# Patient Record
Sex: FEMALE | Race: WHITE
[De-identification: names, ages, dates, MRNs, and addresses within clinical notes are randomized per-mention and may not be internally consistent; named-entity substitution may affect disease eponyms.]

---

## 2023-03-08 ENCOUNTER — APPOINTMENT (OUTPATIENT)
Dept: UROLOGY | Facility: CLINIC | Age: 68
End: 2023-03-08
Payer: MEDICARE

## 2023-03-08 VITALS
HEART RATE: 106 BPM | OXYGEN SATURATION: 77 % | TEMPERATURE: 97.5 F | DIASTOLIC BLOOD PRESSURE: 56 MMHG | RESPIRATION RATE: 14 BRPM | SYSTOLIC BLOOD PRESSURE: 87 MMHG

## 2023-03-08 PROCEDURE — 99202 OFFICE O/P NEW SF 15 MIN: CPT

## 2023-03-08 NOTE — ASSESSMENT
[FreeTextEntry1] : Weight gain. enlarged abdomen\par \par Advised pt to f/u with PCP regarding symptoms and if urinary symptoms worsen and she would like f/u she can make appt.

## 2023-03-08 NOTE — PHYSICAL EXAM
[General Appearance - In No Acute Distress] : no acute distress [] : no respiratory distress [FreeTextEntry1] : In a wheelchair [Oriented To Time, Place, And Person] : oriented to person, place, and time [Affect] : the affect was normal [Mood] : the mood was normal [Not Anxious] : not anxious

## 2023-03-08 NOTE — HISTORY OF PRESENT ILLNESS
[FreeTextEntry1] : Pt states over the past 3 weeks she has noted increased weight gain and her belly looks like it is getting bigger. She denies constipation, diarrhea, last Colonoscopy about 10 years ago. She has not discussed with her PCP. She has incontinence, uses 2 ads per day but states she has had incontinence for years and that it does not bother her. \par \par \par DM, COPD, pulmonary hypertension [Edema] : ~T edema was present

## 2023-03-09 ENCOUNTER — INPATIENT (INPATIENT)
Facility: HOSPITAL | Age: 68
LOS: 4 days | Discharge: HOME HEALTH | DRG: 291 | End: 2023-03-14
Attending: INTERNAL MEDICINE | Admitting: FAMILY MEDICINE
Payer: MEDICARE

## 2023-03-09 VITALS
WEIGHT: 285.06 LBS | SYSTOLIC BLOOD PRESSURE: 124 MMHG | OXYGEN SATURATION: 66 % | HEART RATE: 74 BPM | TEMPERATURE: 98 F | RESPIRATION RATE: 26 BRPM | DIASTOLIC BLOOD PRESSURE: 61 MMHG

## 2023-03-09 LAB
ALBUMIN SERPL ELPH-MCNC: 3.4 G/DL — SIGNIFICANT CHANGE UP (ref 3.3–5)
ALP SERPL-CCNC: 99 U/L — SIGNIFICANT CHANGE UP (ref 40–120)
ALT FLD-CCNC: 27 U/L — SIGNIFICANT CHANGE UP (ref 12–78)
ANION GAP SERPL CALC-SCNC: 5 MMOL/L — SIGNIFICANT CHANGE UP (ref 5–17)
AST SERPL-CCNC: 34 U/L — SIGNIFICANT CHANGE UP (ref 15–37)
BASOPHILS # BLD AUTO: 0.05 K/UL — SIGNIFICANT CHANGE UP (ref 0–0.2)
BASOPHILS NFR BLD AUTO: 0.7 % — SIGNIFICANT CHANGE UP (ref 0–2)
BILIRUB SERPL-MCNC: 0.5 MG/DL — SIGNIFICANT CHANGE UP (ref 0.2–1.2)
BUN SERPL-MCNC: 30 MG/DL — HIGH (ref 7–23)
CALCIUM SERPL-MCNC: 9.3 MG/DL — SIGNIFICANT CHANGE UP (ref 8.5–10.1)
CHLORIDE SERPL-SCNC: 100 MMOL/L — SIGNIFICANT CHANGE UP (ref 96–108)
CO2 SERPL-SCNC: 33 MMOL/L — HIGH (ref 22–31)
CREAT SERPL-MCNC: 1.11 MG/DL — SIGNIFICANT CHANGE UP (ref 0.5–1.3)
EGFR: 54 ML/MIN/1.73M2 — LOW
EOSINOPHIL # BLD AUTO: 0.06 K/UL — SIGNIFICANT CHANGE UP (ref 0–0.5)
EOSINOPHIL NFR BLD AUTO: 0.8 % — SIGNIFICANT CHANGE UP (ref 0–6)
GLUCOSE SERPL-MCNC: 128 MG/DL — HIGH (ref 70–99)
HCT VFR BLD CALC: 42.6 % — SIGNIFICANT CHANGE UP (ref 34.5–45)
HGB BLD-MCNC: 12.5 G/DL — SIGNIFICANT CHANGE UP (ref 11.5–15.5)
IMM GRANULOCYTES NFR BLD AUTO: 0.3 % — SIGNIFICANT CHANGE UP (ref 0–0.9)
LACTATE SERPL-SCNC: 1.8 MMOL/L — SIGNIFICANT CHANGE UP (ref 0.7–2)
LYMPHOCYTES # BLD AUTO: 0.63 K/UL — LOW (ref 1–3.3)
LYMPHOCYTES # BLD AUTO: 8.8 % — LOW (ref 13–44)
MCHC RBC-ENTMCNC: 25.7 PG — LOW (ref 27–34)
MCHC RBC-ENTMCNC: 29.3 GM/DL — LOW (ref 32–36)
MCV RBC AUTO: 87.5 FL — SIGNIFICANT CHANGE UP (ref 80–100)
MONOCYTES # BLD AUTO: 1.09 K/UL — HIGH (ref 0–0.9)
MONOCYTES NFR BLD AUTO: 15.2 % — HIGH (ref 2–14)
NEUTROPHILS # BLD AUTO: 5.3 K/UL — SIGNIFICANT CHANGE UP (ref 1.8–7.4)
NEUTROPHILS NFR BLD AUTO: 74.2 % — SIGNIFICANT CHANGE UP (ref 43–77)
NT-PROBNP SERPL-SCNC: 8881 PG/ML — HIGH (ref 0–125)
PLATELET # BLD AUTO: 280 K/UL — SIGNIFICANT CHANGE UP (ref 150–400)
POTASSIUM SERPL-MCNC: 4.7 MMOL/L — SIGNIFICANT CHANGE UP (ref 3.5–5.3)
POTASSIUM SERPL-SCNC: 4.7 MMOL/L — SIGNIFICANT CHANGE UP (ref 3.5–5.3)
PROT SERPL-MCNC: 7.6 GM/DL — SIGNIFICANT CHANGE UP (ref 6–8.3)
RBC # BLD: 4.87 M/UL — SIGNIFICANT CHANGE UP (ref 3.8–5.2)
RBC # FLD: 15.4 % — HIGH (ref 10.3–14.5)
SODIUM SERPL-SCNC: 138 MMOL/L — SIGNIFICANT CHANGE UP (ref 135–145)
TROPONIN I, HIGH SENSITIVITY RESULT: 27.74 NG/L — SIGNIFICANT CHANGE UP
WBC # BLD: 7.15 K/UL — SIGNIFICANT CHANGE UP (ref 3.8–10.5)
WBC # FLD AUTO: 7.15 K/UL — SIGNIFICANT CHANGE UP (ref 3.8–10.5)

## 2023-03-09 PROCEDURE — 99285 EMERGENCY DEPT VISIT HI MDM: CPT

## 2023-03-09 PROCEDURE — 93010 ELECTROCARDIOGRAM REPORT: CPT

## 2023-03-09 PROCEDURE — 71045 X-RAY EXAM CHEST 1 VIEW: CPT | Mod: 26

## 2023-03-09 RX ORDER — FUROSEMIDE 40 MG
40 TABLET ORAL ONCE
Refills: 0 | Status: COMPLETED | OUTPATIENT
Start: 2023-03-09 | End: 2023-03-09

## 2023-03-09 RX ORDER — IPRATROPIUM/ALBUTEROL SULFATE 18-103MCG
3 AEROSOL WITH ADAPTER (GRAM) INHALATION ONCE
Refills: 0 | Status: COMPLETED | OUTPATIENT
Start: 2023-03-09 | End: 2023-03-09

## 2023-03-09 RX ADMIN — Medication 125 MILLIGRAM(S): at 21:45

## 2023-03-09 RX ADMIN — Medication 3 MILLILITER(S): at 21:46

## 2023-03-09 RX ADMIN — Medication 40 MILLIGRAM(S): at 21:45

## 2023-03-09 RX ADMIN — Medication 0.5 MILLIGRAM(S): at 21:45

## 2023-03-09 NOTE — ED PROVIDER NOTE - PHYSICAL EXAMINATION
Constitutional: Elderly female laying in bed in moderate respiratory distress, AAOx3  Eyes: PERRLA EOMI  Head: Normocephalic atraumatic  Mouth: MMM  Cardiac: regular rate   Resp: rhonchi/wheezing all lung fields  MSK: 2+ pitting edema up to knees  GI: Abd s/nt/nd, no rebound or guarding.  Neuro: awake, alert, moving all extremities, cranial nerves 2-12 intact, sensation intact, no dysmetria.  Skin: No rashes

## 2023-03-09 NOTE — ED PROVIDER NOTE - NS ED ROS FT
Constitutional: No fever or chills  Eyes: No visual changes  HEENT: No throat pain  CV: No chest pain  Resp: No cough. +hypoxic, +SOB  GI: No abd pain, nausea or vomiting  : No dysuria  MSK: No musculoskeletal pain  Skin: No rash  Neuro: No headache

## 2023-03-09 NOTE — ED PROVIDER NOTE - CLINICAL SUMMARY MEDICAL DECISION MAKING FREE TEXT BOX
68 y/o female presents to the ED for combined CHF/COPD exacerbation. Pt presented hypoxic only taking 20mg Lasix daily states would like to be DNR/DNI but will do trial of BiPAP, will give nebs, steroids, Lasix reassess closely.

## 2023-03-09 NOTE — ED PROVIDER NOTE - OBJECTIVE STATEMENT
66 y/o female with a PMHx of CHF, COPD presents to the ED c/o shortness of breath. SpO2 at 66% on RA in triage. Pt states her spO2 is normally in the "mid 80's". Denies chest pain. Pt on 20mg Lasix daily. Cardiologist: Dr. Rhys Felix. Pt is DNR/DNI.

## 2023-03-09 NOTE — ED PROVIDER NOTE - PROGRESS NOTE DETAILS
Jose Daniel Dunne for attending Dr. Germain: Goals of care: Spoke to pt, states she wants to be DNR DNI, son at bedside, confirmed by nurse. Jose Daniel Dunne for attending Dr. Germain: Pt signed out to incoming ED doctor Dr. Gambino. Maki WATTERS: s/o received from Dr. Germain pending lab results and reassessment. patient clinically improving on BiPAP, requesting to take a break from wearing it. VBG noted and had ICU come to evaluate. rpt gas (abg) noted after removed for 1-2 hours and noted. no ICU level of care at this time. spoke with Hospitalist Dr. Montanez for admission.

## 2023-03-09 NOTE — ED PEDIATRIC NURSE NOTE - CHIEF COMPLAINT QUOTE
Pt came to ER with C/O SOB and fluid retention, states she did not take her Lasix today because "I was playing with my meds".

## 2023-03-10 ENCOUNTER — TRANSCRIPTION ENCOUNTER (OUTPATIENT)
Age: 68
End: 2023-03-10

## 2023-03-10 DIAGNOSIS — I50.33 ACUTE ON CHRONIC DIASTOLIC (CONGESTIVE) HEART FAILURE: ICD-10-CM

## 2023-03-10 DIAGNOSIS — Z86.79 PERSONAL HISTORY OF OTHER DISEASES OF THE CIRCULATORY SYSTEM: ICD-10-CM

## 2023-03-10 DIAGNOSIS — I10 ESSENTIAL (PRIMARY) HYPERTENSION: ICD-10-CM

## 2023-03-10 DIAGNOSIS — I50.9 HEART FAILURE, UNSPECIFIED: ICD-10-CM

## 2023-03-10 DIAGNOSIS — Z98.891 HISTORY OF UTERINE SCAR FROM PREVIOUS SURGERY: Chronic | ICD-10-CM

## 2023-03-10 LAB
A1C WITH ESTIMATED AVERAGE GLUCOSE RESULT: 6.9 % — HIGH (ref 4–5.6)
ANION GAP SERPL CALC-SCNC: 5 MMOL/L — SIGNIFICANT CHANGE UP (ref 5–17)
BASE EXCESS BLDA CALC-SCNC: 1.2 MMOL/L — SIGNIFICANT CHANGE UP (ref -2–3)
BASE EXCESS BLDV CALC-SCNC: 6.3 MMOL/L — SIGNIFICANT CHANGE UP
BLOOD GAS COMMENTS ARTERIAL: SIGNIFICANT CHANGE UP
BUN SERPL-MCNC: 36 MG/DL — HIGH (ref 7–23)
CALCIUM SERPL-MCNC: 9.3 MG/DL — SIGNIFICANT CHANGE UP (ref 8.5–10.1)
CHLORIDE SERPL-SCNC: 102 MMOL/L — SIGNIFICANT CHANGE UP (ref 96–108)
CHOLEST SERPL-MCNC: 119 MG/DL — SIGNIFICANT CHANGE UP
CO2 BLDA-SCNC: 30 MMOL/L — HIGH (ref 19–24)
CO2 BLDV-SCNC: 40 MMOL/L — HIGH (ref 22–26)
CO2 SERPL-SCNC: 33 MMOL/L — HIGH (ref 22–31)
CREAT SERPL-MCNC: 1.07 MG/DL — SIGNIFICANT CHANGE UP (ref 0.5–1.3)
EGFR: 56 ML/MIN/1.73M2 — LOW
ESTIMATED AVERAGE GLUCOSE: 151 MG/DL — HIGH (ref 68–114)
GAS PNL BLDA: SIGNIFICANT CHANGE UP
GAS PNL BLDV: SIGNIFICANT CHANGE UP
GLUCOSE BLDC GLUCOMTR-MCNC: 185 MG/DL — HIGH (ref 70–99)
GLUCOSE BLDC GLUCOMTR-MCNC: 188 MG/DL — HIGH (ref 70–99)
GLUCOSE BLDC GLUCOMTR-MCNC: 231 MG/DL — HIGH (ref 70–99)
GLUCOSE BLDC GLUCOMTR-MCNC: 236 MG/DL — HIGH (ref 70–99)
GLUCOSE SERPL-MCNC: 185 MG/DL — HIGH (ref 70–99)
HCO3 BLDA-SCNC: 29 MMOL/L — HIGH (ref 21–28)
HCO3 BLDV-SCNC: 37 MMOL/L — HIGH (ref 22–29)
HCT VFR BLD CALC: 43.2 % — SIGNIFICANT CHANGE UP (ref 34.5–45)
HCV AB S/CO SERPL IA: 0.06 S/CO — SIGNIFICANT CHANGE UP (ref 0–0.99)
HCV AB SERPL-IMP: SIGNIFICANT CHANGE UP
HDLC SERPL-MCNC: 64 MG/DL — SIGNIFICANT CHANGE UP
HGB BLD-MCNC: 12.4 G/DL — SIGNIFICANT CHANGE UP (ref 11.5–15.5)
LIPID PNL WITH DIRECT LDL SERPL: 43 MG/DL — SIGNIFICANT CHANGE UP
MCHC RBC-ENTMCNC: 25.3 PG — LOW (ref 27–34)
MCHC RBC-ENTMCNC: 28.7 GM/DL — LOW (ref 32–36)
MCV RBC AUTO: 88 FL — SIGNIFICANT CHANGE UP (ref 80–100)
NON HDL CHOLESTEROL: 55 MG/DL — SIGNIFICANT CHANGE UP
PCO2 BLDA: 57 MMHG — HIGH (ref 32–45)
PCO2 BLDV: 86 MMHG — HIGH (ref 39–42)
PH BLDA: 7.31 — LOW (ref 7.35–7.45)
PH BLDV: 7.24 — LOW (ref 7.32–7.43)
PLATELET # BLD AUTO: 270 K/UL — SIGNIFICANT CHANGE UP (ref 150–400)
PO2 BLDA: 52 MMHG — LOW (ref 83–108)
PO2 BLDV: 78 MMHG — SIGNIFICANT CHANGE UP
POTASSIUM SERPL-MCNC: 4.8 MMOL/L — SIGNIFICANT CHANGE UP (ref 3.5–5.3)
POTASSIUM SERPL-SCNC: 4.8 MMOL/L — SIGNIFICANT CHANGE UP (ref 3.5–5.3)
RAPID RVP RESULT: SIGNIFICANT CHANGE UP
RBC # BLD: 4.91 M/UL — SIGNIFICANT CHANGE UP (ref 3.8–5.2)
RBC # FLD: 15.2 % — HIGH (ref 10.3–14.5)
SAO2 % BLDA: 79 % — LOW (ref 94–98)
SAO2 % BLDV: 94.2 % — SIGNIFICANT CHANGE UP
SARS-COV-2 RNA SPEC QL NAA+PROBE: SIGNIFICANT CHANGE UP
SODIUM SERPL-SCNC: 140 MMOL/L — SIGNIFICANT CHANGE UP (ref 135–145)
TRIGL SERPL-MCNC: 59 MG/DL — SIGNIFICANT CHANGE UP
WBC # BLD: 3.88 K/UL — SIGNIFICANT CHANGE UP (ref 3.8–10.5)
WBC # FLD AUTO: 3.88 K/UL — SIGNIFICANT CHANGE UP (ref 3.8–10.5)

## 2023-03-10 PROCEDURE — 93010 ELECTROCARDIOGRAM REPORT: CPT

## 2023-03-10 PROCEDURE — 12345: CPT | Mod: NC

## 2023-03-10 PROCEDURE — 93306 TTE W/DOPPLER COMPLETE: CPT

## 2023-03-10 PROCEDURE — 97116 GAIT TRAINING THERAPY: CPT | Mod: GP

## 2023-03-10 PROCEDURE — 83036 HEMOGLOBIN GLYCOSYLATED A1C: CPT

## 2023-03-10 PROCEDURE — 97162 PT EVAL MOD COMPLEX 30 MIN: CPT | Mod: GP

## 2023-03-10 PROCEDURE — 86803 HEPATITIS C AB TEST: CPT

## 2023-03-10 PROCEDURE — 83880 ASSAY OF NATRIURETIC PEPTIDE: CPT

## 2023-03-10 PROCEDURE — 82962 GLUCOSE BLOOD TEST: CPT

## 2023-03-10 PROCEDURE — 80061 LIPID PANEL: CPT

## 2023-03-10 PROCEDURE — 85027 COMPLETE CBC AUTOMATED: CPT

## 2023-03-10 PROCEDURE — 99223 1ST HOSP IP/OBS HIGH 75: CPT

## 2023-03-10 PROCEDURE — 93005 ELECTROCARDIOGRAM TRACING: CPT

## 2023-03-10 PROCEDURE — 36415 COLL VENOUS BLD VENIPUNCTURE: CPT

## 2023-03-10 PROCEDURE — 97530 THERAPEUTIC ACTIVITIES: CPT | Mod: GP

## 2023-03-10 PROCEDURE — 80048 BASIC METABOLIC PNL TOTAL CA: CPT

## 2023-03-10 PROCEDURE — 94640 AIRWAY INHALATION TREATMENT: CPT

## 2023-03-10 RX ORDER — AZITHROMYCIN 500 MG/1
500 TABLET, FILM COATED ORAL DAILY
Refills: 0 | Status: COMPLETED | OUTPATIENT
Start: 2023-03-10 | End: 2023-03-13

## 2023-03-10 RX ORDER — DEXTROSE 50 % IN WATER 50 %
15 SYRINGE (ML) INTRAVENOUS ONCE
Refills: 0 | Status: DISCONTINUED | OUTPATIENT
Start: 2023-03-10 | End: 2023-03-14

## 2023-03-10 RX ORDER — METOPROLOL TARTRATE 50 MG
50 TABLET ORAL DAILY
Refills: 0 | Status: DISCONTINUED | OUTPATIENT
Start: 2023-03-10 | End: 2023-03-14

## 2023-03-10 RX ORDER — ENOXAPARIN SODIUM 100 MG/ML
40 INJECTION SUBCUTANEOUS EVERY 24 HOURS
Refills: 0 | Status: DISCONTINUED | OUTPATIENT
Start: 2023-03-10 | End: 2023-03-14

## 2023-03-10 RX ORDER — PREGABALIN 225 MG/1
1000 CAPSULE ORAL DAILY
Refills: 0 | Status: DISCONTINUED | OUTPATIENT
Start: 2023-03-10 | End: 2023-03-14

## 2023-03-10 RX ORDER — MAGNESIUM OXIDE 400 MG ORAL TABLET 241.3 MG
400 TABLET ORAL DAILY
Refills: 0 | Status: DISCONTINUED | OUTPATIENT
Start: 2023-03-10 | End: 2023-03-14

## 2023-03-10 RX ORDER — DEXTROSE 50 % IN WATER 50 %
25 SYRINGE (ML) INTRAVENOUS ONCE
Refills: 0 | Status: DISCONTINUED | OUTPATIENT
Start: 2023-03-10 | End: 2023-03-14

## 2023-03-10 RX ORDER — SODIUM CHLORIDE 9 MG/ML
1000 INJECTION, SOLUTION INTRAVENOUS
Refills: 0 | Status: DISCONTINUED | OUTPATIENT
Start: 2023-03-10 | End: 2023-03-14

## 2023-03-10 RX ORDER — ATORVASTATIN CALCIUM 80 MG/1
20 TABLET, FILM COATED ORAL AT BEDTIME
Refills: 0 | Status: DISCONTINUED | OUTPATIENT
Start: 2023-03-10 | End: 2023-03-14

## 2023-03-10 RX ORDER — BUDESONIDE AND FORMOTEROL FUMARATE DIHYDRATE 160; 4.5 UG/1; UG/1
2 AEROSOL RESPIRATORY (INHALATION)
Refills: 0 | Status: DISCONTINUED | OUTPATIENT
Start: 2023-03-10 | End: 2023-03-14

## 2023-03-10 RX ORDER — CHOLECALCIFEROL (VITAMIN D3) 125 MCG
1000 CAPSULE ORAL DAILY
Refills: 0 | Status: DISCONTINUED | OUTPATIENT
Start: 2023-03-10 | End: 2023-03-14

## 2023-03-10 RX ORDER — TIOTROPIUM BROMIDE 18 UG/1
2 CAPSULE ORAL; RESPIRATORY (INHALATION) DAILY
Refills: 0 | Status: DISCONTINUED | OUTPATIENT
Start: 2023-03-10 | End: 2023-03-14

## 2023-03-10 RX ORDER — INSULIN LISPRO 100/ML
VIAL (ML) SUBCUTANEOUS
Refills: 0 | Status: DISCONTINUED | OUTPATIENT
Start: 2023-03-10 | End: 2023-03-14

## 2023-03-10 RX ORDER — INSULIN LISPRO 100/ML
VIAL (ML) SUBCUTANEOUS AT BEDTIME
Refills: 0 | Status: DISCONTINUED | OUTPATIENT
Start: 2023-03-10 | End: 2023-03-14

## 2023-03-10 RX ORDER — FUROSEMIDE 40 MG
40 TABLET ORAL ONCE
Refills: 0 | Status: COMPLETED | OUTPATIENT
Start: 2023-03-10 | End: 2023-03-10

## 2023-03-10 RX ORDER — LOSARTAN POTASSIUM 100 MG/1
100 TABLET, FILM COATED ORAL DAILY
Refills: 0 | Status: DISCONTINUED | OUTPATIENT
Start: 2023-03-10 | End: 2023-03-14

## 2023-03-10 RX ORDER — AMLODIPINE BESYLATE 2.5 MG/1
2.5 TABLET ORAL DAILY
Refills: 0 | Status: DISCONTINUED | OUTPATIENT
Start: 2023-03-10 | End: 2023-03-14

## 2023-03-10 RX ORDER — FUROSEMIDE 40 MG
40 TABLET ORAL
Refills: 0 | Status: DISCONTINUED | OUTPATIENT
Start: 2023-03-10 | End: 2023-03-11

## 2023-03-10 RX ORDER — ALBUTEROL 90 UG/1
2 AEROSOL, METERED ORAL EVERY 6 HOURS
Refills: 0 | Status: DISCONTINUED | OUTPATIENT
Start: 2023-03-10 | End: 2023-03-14

## 2023-03-10 RX ORDER — DEXTROSE 50 % IN WATER 50 %
12.5 SYRINGE (ML) INTRAVENOUS ONCE
Refills: 0 | Status: DISCONTINUED | OUTPATIENT
Start: 2023-03-10 | End: 2023-03-14

## 2023-03-10 RX ORDER — ASPIRIN/CALCIUM CARB/MAGNESIUM 324 MG
81 TABLET ORAL DAILY
Refills: 0 | Status: DISCONTINUED | OUTPATIENT
Start: 2023-03-10 | End: 2023-03-14

## 2023-03-10 RX ORDER — ACETAMINOPHEN 500 MG
650 TABLET ORAL EVERY 6 HOURS
Refills: 0 | Status: DISCONTINUED | OUTPATIENT
Start: 2023-03-10 | End: 2023-03-14

## 2023-03-10 RX ORDER — SPIRONOLACTONE 25 MG/1
25 TABLET, FILM COATED ORAL DAILY
Refills: 0 | Status: DISCONTINUED | OUTPATIENT
Start: 2023-03-10 | End: 2023-03-13

## 2023-03-10 RX ORDER — ONDANSETRON 8 MG/1
4 TABLET, FILM COATED ORAL EVERY 8 HOURS
Refills: 0 | Status: DISCONTINUED | OUTPATIENT
Start: 2023-03-10 | End: 2023-03-14

## 2023-03-10 RX ORDER — GLUCAGON INJECTION, SOLUTION 0.5 MG/.1ML
1 INJECTION, SOLUTION SUBCUTANEOUS ONCE
Refills: 0 | Status: DISCONTINUED | OUTPATIENT
Start: 2023-03-10 | End: 2023-03-14

## 2023-03-10 RX ORDER — LANOLIN ALCOHOL/MO/W.PET/CERES
3 CREAM (GRAM) TOPICAL AT BEDTIME
Refills: 0 | Status: DISCONTINUED | OUTPATIENT
Start: 2023-03-10 | End: 2023-03-14

## 2023-03-10 RX ADMIN — BUDESONIDE AND FORMOTEROL FUMARATE DIHYDRATE 2 PUFF(S): 160; 4.5 AEROSOL RESPIRATORY (INHALATION) at 21:17

## 2023-03-10 RX ADMIN — Medication 50 MILLIGRAM(S): at 09:44

## 2023-03-10 RX ADMIN — Medication 40 MILLIGRAM(S): at 09:43

## 2023-03-10 RX ADMIN — MAGNESIUM OXIDE 400 MG ORAL TABLET 400 MILLIGRAM(S): 241.3 TABLET ORAL at 09:44

## 2023-03-10 RX ADMIN — ATORVASTATIN CALCIUM 20 MILLIGRAM(S): 80 TABLET, FILM COATED ORAL at 22:38

## 2023-03-10 RX ADMIN — Medication 2: at 11:21

## 2023-03-10 RX ADMIN — AMLODIPINE BESYLATE 2.5 MILLIGRAM(S): 2.5 TABLET ORAL at 10:04

## 2023-03-10 RX ADMIN — BUDESONIDE AND FORMOTEROL FUMARATE DIHYDRATE 2 PUFF(S): 160; 4.5 AEROSOL RESPIRATORY (INHALATION) at 09:48

## 2023-03-10 RX ADMIN — Medication 600 MILLIGRAM(S): at 22:37

## 2023-03-10 RX ADMIN — TIOTROPIUM BROMIDE 2 PUFF(S): 18 CAPSULE ORAL; RESPIRATORY (INHALATION) at 09:48

## 2023-03-10 RX ADMIN — Medication 1: at 08:04

## 2023-03-10 RX ADMIN — Medication 1000 UNIT(S): at 09:44

## 2023-03-10 RX ADMIN — LOSARTAN POTASSIUM 100 MILLIGRAM(S): 100 TABLET, FILM COATED ORAL at 10:03

## 2023-03-10 RX ADMIN — Medication 40 MILLIGRAM(S): at 03:26

## 2023-03-10 RX ADMIN — Medication 2: at 16:18

## 2023-03-10 RX ADMIN — Medication 81 MILLIGRAM(S): at 09:43

## 2023-03-10 RX ADMIN — AZITHROMYCIN 500 MILLIGRAM(S): 500 TABLET, FILM COATED ORAL at 16:09

## 2023-03-10 RX ADMIN — PREGABALIN 1000 MICROGRAM(S): 225 CAPSULE ORAL at 09:44

## 2023-03-10 RX ADMIN — ENOXAPARIN SODIUM 40 MILLIGRAM(S): 100 INJECTION SUBCUTANEOUS at 09:43

## 2023-03-10 RX ADMIN — Medication 40 MILLIGRAM(S): at 22:37

## 2023-03-10 RX ADMIN — Medication 0.5 MILLIGRAM(S): at 02:55

## 2023-03-10 RX ADMIN — SPIRONOLACTONE 25 MILLIGRAM(S): 25 TABLET, FILM COATED ORAL at 09:44

## 2023-03-10 RX ADMIN — Medication 40 MILLIGRAM(S): at 17:18

## 2023-03-10 NOTE — H&P ADULT - HISTORY OF PRESENT ILLNESS
66 yo female with PMH of COPD (on nocturnal home O2), DM2, pulm HTN, LBBB, HTN, HLD presents to the ED with dyspnea. Pt states for the past 3 weeks she has been feeling unwell. Complains of dyspnea on exertion. Has a chronic cough productive of clear sputum and sometimes brownish which is unchanged secondary to her COPD. No fevers. No chest pain. She has noticed a 25 lb weight gain. She admits to dietary indiscretions with drinking increased fluids at home. She has been trying to adjust her diuretics on her own at home (lasix and aldactone) but symptoms did not improve. Denies any headaches, visual changes, CP, abd pain, N/V/D, dysuria.   In the ED patient initially hypoxic to 60s. Placed on BIPAP. She was given lasix and solumedrol with improvement of symptoms. She is currently off BIPAP and feeling better. Seen by ICU as well.

## 2023-03-10 NOTE — H&P ADULT - NSHPPHYSICALEXAM_GEN_ALL_CORE
Vital Signs Last 24 Hrs  T(C): 36.5 (09 Mar 2023 21:12), Max: 36.5 (09 Mar 2023 21:12)  T(F): 97.7 (09 Mar 2023 21:12), Max: 97.7 (09 Mar 2023 21:12)  HR: 74 (09 Mar 2023 21:12) (74 - 74)  BP: 124/61 (09 Mar 2023 21:12) (124/61 - 124/61)  BP(mean): --  RR: 22 (09 Mar 2023 21:30) (22 - 26)  SpO2: 91% (10 Mar 2023 02:16) (66% - 100%)    Parameters below as of 09 Mar 2023 21:30  Patient On (Oxygen Delivery Method): nasal cannula  O2 Flow (L/min): 4

## 2023-03-10 NOTE — CONSULT NOTE ADULT - ASSESSMENT
68 y/o Female with PMH of COPD (On 3L Home O2), DM, HTN, presents to  ED complaining of shortness of breath with:       1. Acute on Chronic Hypoxic/ Hypercapnic Respiratory Failure   2. Acute HF Exacerbation v Acute COPD Exacerbation        -Patient satting 66% on RA on admission, transition to NIPPV with significant improvement in saturation and dyspnea. VBG @ 0:00 pH 7.24 CO2 86, O2 78, HCO3 37. On arrival, patient in no signs of respiratory distress. CXR on admission reviewed, with B/L congestion. Transitioned off NIPPV to 3L NC, maintaining saturation >90% in no acute distress. Goal saturation ~90%. Repeat ABG pH 7.31 CO2 57 O2 52 HCO3 29. Patient likely with undiagnose CARRINGTON, recommend nocturnal CPAP. Respiratory status likely multifactorial in setting of acute CHF v COPD exacerbation with underlying CARRINGTON/OHV. s/p SoluMedrol and Duoneb in ED. Recommend continue with Steroid and CAP coverage.   Significantly volume overloaded on exam, with history of Lasix non-compliance x3 days. S/P 40mg Lasix in ED. Will administer additional 40 mg Lasix stat. Likely transition 20mg Lasix QD in AM.         Patient stable for admission to Telemetry with , currently not candidate for ICU level care. Please re-consult if patient status changes.

## 2023-03-10 NOTE — DISCHARGE NOTE NURSING/CASE MANAGEMENT/SOCIAL WORK - PATIENT PORTAL LINK FT
You can access the FollowMyHealth Patient Portal offered by [facility] by registering at the following website: [email or web address]. By joining [employer]’s FollowMyHealth portal, you will also be able to view your health information using other applications (apps) compatible with our system.

## 2023-03-10 NOTE — CONSULT NOTE ADULT - PROBLEM SELECTOR RECOMMENDATION 9
pt; with known HFpEF - follows with cardiology in [place] - Dr. Joiner, now with PAINTER due to acute on chronic HFpEF exacerbation and COPD exacerbation; elevated BNP 8881 - will recheck on Sunday, Echo shows preserved LVEF 55-60%, with severely dilated RV, severe pHTN and moderate TR Plan: diuresis with lasix 40 mg ivp bid, continue GDMT with BB/ARB/MRA, daily weight, strict I&Os, fluid restriction 1.5L/day

## 2023-03-10 NOTE — DISCHARGE NOTE NURSING/CASE MANAGEMENT/SOCIAL WORK - NSDCPEFALRISK_GEN_ALL_CORE
For information on Fall & Injury Prevention, visit: [email or web address] OR  [email or web address] OR  https://www.cdc.gov/steadi/patient.html

## 2023-03-10 NOTE — H&P ADULT - NSICDXPASTMEDICALHX_GEN_ALL_CORE_FT
PAST MEDICAL HISTORY:  COPD, severe     H/O pulmonary hypertension     History of left bundle branch block (LBBB)     Hypertension

## 2023-03-10 NOTE — PATIENT PROFILE ADULT - FALL HARM RISK - HARM RISK INTERVENTIONS
Assistance with ambulation/Assistance OOB with selected safe patient handling equipment/Communicate Risk of Fall with Harm to all staff/Discuss with provider need for PT consult/Monitor gait and stability/Provide patient with walking aids - walker, cane, crutches/Reinforce activity limits and safety measures with patient and family/Tailored Fall Risk Interventions/Use of alarms - bed, chair and/or voice tab/Visual Cue: Yellow wristband and red socks/Bed in lowest position, wheels locked, appropriate side rails in place/Call bell, personal items and telephone in reach/Instruct patient to call for assistance before getting out of bed or chair/Non-slip footwear when patient is out of bed/[place] to call system/Physically safe environment - no spills, clutter or unnecessary equipment/Purposeful Proactive Rounding/Room/bathroom lighting operational, light cord in reach

## 2023-03-10 NOTE — PATIENT PROFILE ADULT - FALL HARM RISK - CONCLUSION
Called patient and LMOM that we cannot talk to Oliva Velazquez as she is not on HIPPA but we would be glad to talk to her about anything she wants
Cannot speak to anyone who is not on HIPAA, We cannot even acknowledge that this is a patient here to this friend  Msg forward to Juanis Allen
Jami De La Cruz a friend (NOT ON HIPAA)  Requesting to speak to Dr Anmol Martinez. [facility] Has fup  Questions. ..states she was given permission to get info from Dr Anmol Martinez
Fall with Harm Risk

## 2023-03-10 NOTE — H&P ADULT - ASSESSMENT
66 yo female with PMH of COPD (on nocturnal home O2), DM2, pulm HTN, LBBB, HTN, HLD admitted with:    #acute on chronic hypoxic and hypercapnic respiratory failure  #acute CHF exacerbation (unknown diastolic vs systolic at this time)  #mild COPD exacerbation  - admit to tele  - diuresis with lasix 40mg IV BID  - continue aldactone  - check echo  - continue metoprolol  - ABG noted  - s/p solumedrol 125mg IV, continue 40mg IV BID  - symbicort  - spiriva  - albuterol  - cardio consult  - pulm consult  - s/p BIPAP, may continue as needed  - O2 supplementation via NC    #DM2  - hold metformin  - ISS  - check HbA1c    #HTN  - continue CCB, BB    #HLD  - continue statin    #DVT prophylaxis  - lovenox

## 2023-03-11 LAB
ANION GAP SERPL CALC-SCNC: 1 MMOL/L — LOW (ref 5–17)
BUN SERPL-MCNC: 48 MG/DL — HIGH (ref 7–23)
CALCIUM SERPL-MCNC: 8.8 MG/DL — SIGNIFICANT CHANGE UP (ref 8.5–10.1)
CHLORIDE SERPL-SCNC: 101 MMOL/L — SIGNIFICANT CHANGE UP (ref 96–108)
CO2 SERPL-SCNC: 33 MMOL/L — HIGH (ref 22–31)
CREAT SERPL-MCNC: 1.48 MG/DL — HIGH (ref 0.5–1.3)
EGFR: 38 ML/MIN/1.73M2 — LOW
GLUCOSE BLDC GLUCOMTR-MCNC: 174 MG/DL — HIGH (ref 70–99)
GLUCOSE BLDC GLUCOMTR-MCNC: 211 MG/DL — HIGH (ref 70–99)
GLUCOSE BLDC GLUCOMTR-MCNC: 221 MG/DL — HIGH (ref 70–99)
GLUCOSE BLDC GLUCOMTR-MCNC: 278 MG/DL — HIGH (ref 70–99)
GLUCOSE SERPL-MCNC: 203 MG/DL — HIGH (ref 70–99)
POTASSIUM SERPL-MCNC: 5.1 MMOL/L — SIGNIFICANT CHANGE UP (ref 3.5–5.3)
POTASSIUM SERPL-SCNC: 5.1 MMOL/L — SIGNIFICANT CHANGE UP (ref 3.5–5.3)
SODIUM SERPL-SCNC: 135 MMOL/L — SIGNIFICANT CHANGE UP (ref 135–145)

## 2023-03-11 PROCEDURE — 99233 SBSQ HOSP IP/OBS HIGH 50: CPT

## 2023-03-11 RX ADMIN — TIOTROPIUM BROMIDE 2 PUFF(S): 18 CAPSULE ORAL; RESPIRATORY (INHALATION) at 08:34

## 2023-03-11 RX ADMIN — Medication 600 MILLIGRAM(S): at 09:04

## 2023-03-11 RX ADMIN — BUDESONIDE AND FORMOTEROL FUMARATE DIHYDRATE 2 PUFF(S): 160; 4.5 AEROSOL RESPIRATORY (INHALATION) at 08:35

## 2023-03-11 RX ADMIN — BUDESONIDE AND FORMOTEROL FUMARATE DIHYDRATE 2 PUFF(S): 160; 4.5 AEROSOL RESPIRATORY (INHALATION) at 20:47

## 2023-03-11 RX ADMIN — Medication 40 MILLIGRAM(S): at 06:43

## 2023-03-11 RX ADMIN — Medication 81 MILLIGRAM(S): at 09:04

## 2023-03-11 RX ADMIN — SPIRONOLACTONE 25 MILLIGRAM(S): 25 TABLET, FILM COATED ORAL at 09:03

## 2023-03-11 RX ADMIN — Medication 600 MILLIGRAM(S): at 22:34

## 2023-03-11 RX ADMIN — Medication 40 MILLIGRAM(S): at 14:20

## 2023-03-11 RX ADMIN — AMLODIPINE BESYLATE 2.5 MILLIGRAM(S): 2.5 TABLET ORAL at 09:02

## 2023-03-11 RX ADMIN — PREGABALIN 1000 MICROGRAM(S): 225 CAPSULE ORAL at 09:03

## 2023-03-11 RX ADMIN — ENOXAPARIN SODIUM 40 MILLIGRAM(S): 100 INJECTION SUBCUTANEOUS at 09:04

## 2023-03-11 RX ADMIN — AZITHROMYCIN 500 MILLIGRAM(S): 500 TABLET, FILM COATED ORAL at 09:02

## 2023-03-11 RX ADMIN — Medication 50 MILLIGRAM(S): at 09:03

## 2023-03-11 RX ADMIN — Medication 3 MILLIGRAM(S): at 22:35

## 2023-03-11 RX ADMIN — MAGNESIUM OXIDE 400 MG ORAL TABLET 400 MILLIGRAM(S): 241.3 TABLET ORAL at 09:04

## 2023-03-11 RX ADMIN — LOSARTAN POTASSIUM 100 MILLIGRAM(S): 100 TABLET, FILM COATED ORAL at 09:03

## 2023-03-11 RX ADMIN — ATORVASTATIN CALCIUM 20 MILLIGRAM(S): 80 TABLET, FILM COATED ORAL at 22:34

## 2023-03-11 RX ADMIN — Medication 30 MILLIGRAM(S): at 22:35

## 2023-03-11 RX ADMIN — Medication 1: at 08:04

## 2023-03-11 RX ADMIN — Medication 1000 UNIT(S): at 09:03

## 2023-03-11 RX ADMIN — Medication 3: at 12:13

## 2023-03-11 RX ADMIN — Medication 2: at 17:05

## 2023-03-11 RX ADMIN — Medication 40 MILLIGRAM(S): at 09:03

## 2023-03-11 NOTE — CONSULT NOTE ADULT - SUBJECTIVE AND OBJECTIVE BOX
Chief Complaint: "I was blowing up" - 25 pound weight gain over 3 weeks PTA with increasing leg edema, increasing abdominal girth, and shortness of breath.     HPI: 68 yo female c/o 3 week hx of  increasing leg edema, increasing abdominal girth, and shortness of breath. She denies hx of fever, chills, chest pain, purulent sputum production, or hemoptysis. The patient started taking diuretics (Lasix and aldactone) on her own but her edema did not improve.    The patient has severe COPD with chronic hypoxic respiratory failure on home O2.        3/11/2023: Sitting up in chair. Breathing comfortably. Feels much better. No dyspnea at rest. Edema improving with IV diuresis.        PAST MEDICAL & SURGICAL HISTORY:  COPD, severe      H/O pulmonary hypertension      Hypertension      History of left bundle branch block (LBBB)      S/P           REVIEW OF SYSTEMS:    As in HPI   All other review of systems is negative unless indicated above    Vital Signs Last 24 Hrs  T(C): 36.6 (11 Mar 2023 07:20), Max: 37.7 (10 Mar 2023 20:23)  T(F): 97.8 (11 Mar 2023 07:20), Max: 99.8 (10 Mar 2023 20:23)  HR: 69 (11 Mar 2023 07:20) (69 - 82)  BP: 104/44 (11 Mar 2023 07:20) (92/40 - 104/50)  BP(mean): --  RR: 18 (11 Mar 2023 07:20) (18 - 20)  SpO2: 92% (11 Mar 2023 07:20) (90% - 95%)    Parameters below as of 11 Mar 2023 10:07  Patient On (Oxygen Delivery Method): nasal cannula        I&O's Summary      PHYSICAL EXAM:    Constitutional: NAD, awake and alert  Neck: Soft and supple, No LAD, No JVD  Respiratory: Breath sounds are equal bilaterally; diminished, No wheezing  Cardiovascular: S1 and S2, regular rate and rhythm, no Murmurs, gallops or rubs  Extremities: ++++ peripheral edema  Neurological: A/O x 3, no focal deficits  Skin: No rashes      Medications:  MEDICATIONS  (STANDING):  amLODIPine   Tablet 2.5 milliGRAM(s) Oral daily  aspirin enteric coated 81 milliGRAM(s) Oral daily  atorvastatin 20 milliGRAM(s) Oral at bedtime  azithromycin   Tablet 500 milliGRAM(s) Oral daily  budesonide 160 MICROgram(s)/formoterol 4.5 MICROgram(s) Inhaler 2 Puff(s) Inhalation two times a day  cholecalciferol 1000 Unit(s) Oral daily  cyanocobalamin 1000 MICROGram(s) Oral daily  dextrose 5%. 1000 milliLiter(s) (100 mL/Hr) IV Continuous <Continuous>  dextrose 5%. 1000 milliLiter(s) (50 mL/Hr) IV Continuous <Continuous>  dextrose 50% Injectable 25 Gram(s) IV Push once  dextrose 50% Injectable 12.5 Gram(s) IV Push once  dextrose 50% Injectable 25 Gram(s) IV Push once  enoxaparin Injectable 40 milliGRAM(s) SubCutaneous every 24 hours  furosemide   Injectable 40 milliGRAM(s) IV Push two times a day  glucagon  Injectable 1 milliGRAM(s) IntraMuscular once  guaiFENesin  milliGRAM(s) Oral every 12 hours  insulin lispro (ADMELOG) corrective regimen sliding scale   SubCutaneous three times a day before meals  insulin lispro (ADMELOG) corrective regimen sliding scale   SubCutaneous at bedtime  losartan 100 milliGRAM(s) Oral daily  magnesium oxide 400 milliGRAM(s) Oral daily  methylPREDNISolone sodium succinate Injectable 40 milliGRAM(s) IV Push every 12 hours  metoprolol succinate ER 50 milliGRAM(s) Oral daily  spironolactone 25 milliGRAM(s) Oral daily  tiotropium 2.5 MICROgram(s) Inhaler 2 Puff(s) Inhalation daily      Labs: All Labs Reviewed:    Blood Gas Profile - Arterial (03.10.23 @ 03:06)   pH, Arterial: 7.31   pCO2, Arterial: 57 mmHg   pO2, Arterial: 52 mmHg   HCO3, Arterial: 29 mmol/L   Base Excess, Arterial: 1.2 mmol/L   Oxygen Saturation, Arterial: 79 %   Total CO2, Arterial: 30 mmol/L   Blood Gas Comments Arterial: NC 4   Blood Gas Source Arterial: Arterial     Pro-Brain Natriuretic Peptide (23 @ 21:48)   Pro-Brain Natriuretic Peptide: 8881 pg/mL                           12.4   3.88  )-----------( 270      ( 10 Mar 2023 07:29 )             43.2     03-    135  |  101  |  48<H>  ----------------------------<  203<H>  5.1   |  33<H>  |  1.48<H>    Ca    8.8      11 Mar 2023 05:28    TPro  7.6  /  Alb  3.4  /  TBili  0.5  /  DBili  x   /  AST  34  /  ALT  27  /  AlkPhos  99          RADIOLOGY:    TTE Echo Complete w/ Contrast w/ Doppler (03.10.23 @ 10:02) >   Impression     Summary     Endocardium is not well visualized, however, overall left ventricular   systolic function appears normal. Technically Difficult Study.   Septal flattening is seen; this finding is consistent with right heart   pressure / volume overload.   Estimated left ventricular ejection fraction is 55-60 %.   Normal appearing left atrium.   The right atrium appears moderately dilated.   The right ventricle is severely dilated.   The right ventricular apex appears hypokinetic.   The aortic valve is trileaflet with thin pliable leaflets.   Moderate mitral annular calcification is present.   There is calcification of mitral valve leaflets. The leaflet opening is   normal.   EA reversal of the mitral inflow consistent with reduced compliance of   the   left ventricle.   Trace mitral regurgitation is present.   The tricuspid valve leaflets are thin and pliable; valve motion is   normal.   Moderate (2+) tricuspid valve regurgitation is present.   Severe pulmonary hypertension.   Pulmonic valve not well seen.   No evidence of pericardial effusion.   No evidence of pleural effusion.   IVC is dilated and not collapsing with inspiration.               EXAM:  XR CHEST PORTABLE IMMED 1V   ORDERED BY: RENATA BUTLER     PROCEDURE DATE:  2023          INTERPRETATION:  INDICATION: Short of breath    Portable chest 9:30 PM    COMPARISON: 2016    Patient rotated. Overlying EKG leads and wires.    FINDINGS:  Heart/Vascular: The mediastinum, hilum and aorta are within normal limits   for projection. Cardiomegaly.  Pulmonary: Midline trachea. There is mild pulmonary venous congestion.   There is no focal consolidation or gross pleural effusion.    Bones: There is no fracture.  Lines and catheter: None    Impression:    Cardiomegaly with mild pulmonary venous congestion.    --- End of Report ---    GENET SWIFT DO; Attending Radiologist        Assessment/Plan: 1. Acute on chronic diastolic congestive heart failure. Responding to diuresis. Per cardiology.     2. Very severe COPD / acute on chronic hypercarbic respiratory failure with hypoxia. Continue inhaled bronchodilators. Would decrease steroids. Continue O2 supplement.     3. Pulmonary HTN. Group 2 and group 3 etiology.         Time Span: 60 minutes. 
Patient is a 67y old  Female who presents with a chief complaint of       BRIEF HOSPITAL COURSE-  66 y/o Female with PMH of COPD (On 3L Home O2), DM, HTN, presents to  ED complaining of shortness of breath. Reports that she has been increasing short of breath x3 week. Admits to worsening over the past week, both with exertion and at rest. Admits to taking 80mg of Lasix; however, with no significant increase in urine output. States that she was worried about causing injury to her kidney and therefore skipped her 20 Lasix dose x3 days. Reports increasing oxygen requirements at home, specifically with activity. Admits to associated swelling her in legs x1 week. Admits to following up with PCP this AM, who encouraged her to go to the hospital.     On presentation to the ED, labs significant for elevated serum BNP (8881), elevated serum bicarbonate (CO2 33). Patient dyspneic, satting 66 on RA. Transitioned to NIPPV. VBG pH 7.24 CO2 86, O2 78, HCO3 37. ICU consulted.           Review of Systems:  CONSTITUTIONAL: No fever, chills, or fatigue.  EYES: No eye pain, visual disturbances, or discharge.  ENMT:  No difficulty hearing, tinnitus, vertigo. No sinus or throat pain.  NECK: No pain or stiffness.  RESPIRATORY: +SOB. No cough, wheezing, chills or hemoptysis.  CARDIOVASCULAR: +Leg swelling. No chest pain, palpitations, or dizziness.  GASTROINTESTINAL: No abdominal or epigastric pain. No nausea, vomiting, or hematemesis. No diarrhea or constipation. No melena or hematochezia.  GENITOURINARY: No dysuria, frequency, hematuria, or incontinence.  NEUROLOGICAL: No headaches, memory loss, loss of strength, numbness, or tremors.  SKIN: No itching, burning, rashes, or lesions.   MUSCULOSKELETAL: No joint pain or swelling. No muscle, back, or extremity pain.  PSYCHIATRIC: No depression, anxiety, mood swings, or difficulty sleeping.        PAST MEDICAL & SURGICAL HISTORY-          Medications:    furosemide   Injectable 40 milliGRAM(s) IV Push once              ICU Vital Signs Last 24 Hrs  T(C): 36.5 (09 Mar 2023 21:12), Max: 36.5 (09 Mar 2023 21:12)  T(F): 97.7 (09 Mar 2023 21:12), Max: 97.7 (09 Mar 2023 21:12)  HR: 74 (09 Mar 2023 21:12) (74 - 74)  BP: 124/61 (09 Mar 2023 21:12) (124/61 - 124/61)  BP(mean): --  ABP: --  ABP(mean): --  RR: 22 (09 Mar 2023 21:30) (22 - 26)  SpO2: 91% (10 Mar 2023 02:16) (66% - 100%)    O2 Parameters below as of 09 Mar 2023 21:30  Patient On (Oxygen Delivery Method): nasal cannula  O2 Flow (L/min): 4          ABG - ( 10 Mar 2023 03:06 )  pH, Arterial: 7.31  pH, Blood: x     /  pCO2: 57    /  pO2: 52    / HCO3: 29    / Base Excess: 1.2   /  SaO2: 79              I&O's Detail          LABS:                        12.5   7.15  )-----------( 280      ( 09 Mar 2023 21:48 )             42.6     03-09    138  |  100  |  30<H>  ----------------------------<  128<H>  4.7   |  33<H>  |  1.11    Ca    9.3      09 Mar 2023 21:48    TPro  7.6  /  Alb  3.4  /  TBili  0.5  /  DBili  x   /  AST  34  /  ALT  27  /  AlkPhos  99  03-09          CAPILLARY BLOOD GLUCOSE          CULTURES:  Rapid RVP Result: NotDetec (03-09 @ 21:48)        Physical Examination:  General: Elderly, obese female. In mild respiratory distress.  HEENT: Pupils equal, reactive to light.  Symmetric.  PULM: Crackles to auscultation bilateral bases. No significant sputum production. Dyspneic but no accessory muscle use.   NECK: Supple, no lymphadenopathy, trachea midline.  CVS: Regular rate and rhythm. No murmurs, rubs, or gallops. S1, S2 intact.   ABD: Soft, nondistended, nontender, normoactive bowel sounds. No masses palpable   EXT: B/L 2+pitting edema. Nontender  SKIN: Warm and well perfused, no rashes noted.  NEURO: Alert, oriented, interactive, nonfocal.  DEVICES:           RADIOLOGY-  
  CHIEF COMPLAINT: Patient is a 67y old  Female who presents with a chief complaint of SOB (10 Mar 2023 04:09)      HPI:  66 yo female with PMH of COPD (on nocturnal home O2), DM2, pulm HTN, LBBB, HTN, HLD presents to the ED with dyspnea. Pt states for the past 3 weeks she has been feeling unwell. Complains of dyspnea on exertion. Has a chronic cough productive of clear sputum and sometimes brownish which is unchanged secondary to her COPD. No fevers. No chest pain. She has noticed a 25 lb weight gain. She admits to dietary indiscretions with drinking increased fluids at home. She has been trying to adjust her diuretics on her own at home (lasix and aldactone) but symptoms did not improve. Denies any headaches, visual changes, CP, abd pain, N/V/D, dysuria.   In the ED patient initially hypoxic to 60s. Placed on BIPAP. She was given lasix and solumedrol with improvement of symptoms. She is currently off BIPAP and feeling better. Seen by ICU as well.  (10 Mar 2023 04:09)  Cardiology consulted to evaluate for CHF. Pt.'s cardiologist is Dr. Rhys Erazo, pt. with known HFpEF and COPD, Echo with preserved LVEF 55-60%, needs diuresis    PAST MEDICAL / SURGICAL HISTORY:  PAST MEDICAL & SURGICAL HISTORY:  COPD, severe      H/O pulmonary hypertension      Hypertension      History of left bundle branch block (LBBB)      S/P           SOCIAL HISTORY:   Alcohol: Denied  Smoking: Nonsmoker  Drug Use: Denied  Marital Status:     FAMILY HISTORY: FAMILY HISTORY:  FH: heart disease        MEDICATIONS  (STANDING):  amLODIPine   Tablet 2.5 milliGRAM(s) Oral daily  aspirin enteric coated 81 milliGRAM(s) Oral daily  atorvastatin 20 milliGRAM(s) Oral at bedtime  budesonide 160 MICROgram(s)/formoterol 4.5 MICROgram(s) Inhaler 2 Puff(s) Inhalation two times a day  cholecalciferol 1000 Unit(s) Oral daily  cyanocobalamin 1000 MICROGram(s) Oral daily  dextrose 5%. 1000 milliLiter(s) (50 mL/Hr) IV Continuous <Continuous>  dextrose 5%. 1000 milliLiter(s) (100 mL/Hr) IV Continuous <Continuous>  dextrose 50% Injectable 25 Gram(s) IV Push once  dextrose 50% Injectable 12.5 Gram(s) IV Push once  dextrose 50% Injectable 25 Gram(s) IV Push once  enoxaparin Injectable 40 milliGRAM(s) SubCutaneous every 24 hours  furosemide   Injectable 40 milliGRAM(s) IV Push two times a day  glucagon  Injectable 1 milliGRAM(s) IntraMuscular once  insulin lispro (ADMELOG) corrective regimen sliding scale   SubCutaneous three times a day before meals  insulin lispro (ADMELOG) corrective regimen sliding scale   SubCutaneous at bedtime  losartan 100 milliGRAM(s) Oral daily  magnesium oxide 400 milliGRAM(s) Oral daily  methylPREDNISolone sodium succinate Injectable 40 milliGRAM(s) IV Push every 12 hours  metoprolol succinate ER 50 milliGRAM(s) Oral daily  spironolactone 25 milliGRAM(s) Oral daily  tiotropium 2.5 MICROgram(s) Inhaler 2 Puff(s) Inhalation daily    MEDICATIONS  (PRN):  acetaminophen     Tablet .. 650 milliGRAM(s) Oral every 6 hours PRN Temp greater or equal to 38C (100.4F), Mild Pain (1 - 3)  albuterol    90 MICROgram(s) HFA Inhaler 2 Puff(s) Inhalation every 6 hours PRN Shortness of Breath and/or Wheezing  aluminum hydroxide/magnesium hydroxide/simethicone Suspension 30 milliLiter(s) Oral every 4 hours PRN Dyspepsia  dextrose Oral Gel 15 Gram(s) Oral once PRN Blood Glucose LESS THAN 70 milliGRAM(s)/deciliter  melatonin 3 milliGRAM(s) Oral at bedtime PRN Insomnia  ondansetron Injectable 4 milliGRAM(s) IV Push every 8 hours PRN Nausea and/or Vomiting      Allergies    latex (Unknown)  No Known Drug Allergies    Intolerances        REVIEW OF SYSTEMS:  CONSTITUTIONAL: No weakness, fevers or chills  Eyes: No visual changes  NECK: No pain or stiffness  RESPIRATORY: No cough, wheezing, hemoptysis; + shortness of breath  CARDIOVASCULAR: No chest pain or palpitations  GASTROINTESTINAL: No abdominal pain. No nausea, vomiting, or hematemesis; No diarrhea or constipation. No melena or hematochezia.  GENITOURINARY: No dysuria, frequency or hematuria  NEUROLOGICAL: No numbness.  SKIN: No itching or rash  All other review of systems is negative unless indicated above    VITAL SIGNS:   Vital Signs Last 24 Hrs  T(C): 36.7 (10 Mar 2023 08:44), Max: 36.8 (10 Mar 2023 07:06)  T(F): 98.1 (10 Mar 2023 08:44), Max: 98.3 (10 Mar 2023 07:06)  HR: 76 (10 Mar 2023 08:44) (69 - 88)  BP: 120/56 (10 Mar 2023 08:44) (110/59 - 126/74)  BP(mean): 86 (10 Mar 2023 06:29) (86 - 86)  RR: 20 (10 Mar 2023 08:44) (20 - 26)  SpO2: 95% (10 Mar 2023 08:44) (66% - 100%)    Parameters below as of 10 Mar 2023 08:44  Patient On (Oxygen Delivery Method): nasal cannula  O2 Flow (L/min): 4      I&O's Summary      PHYSICAL EXAM:  Constitutional: NAD, awake and alert  HEENT:  EOMI,  Pupils round, No oral cyanosis.  Pulmonary: Scattered wheezes   Cardiovascular: S1 and S2, regular rate and rhythm, no Murmurs, gallops or rubs  Gastrointestinal: Bowel Sounds present, soft, nontender.   Lymph: No peripheral edema. No cervical lymphadenopathy.  Neurological: Alert, no focal deficits  Extremities: +2 pedal edema   Skin: No rashes.  Psych:  Mood & affect appropriate    LABS:                        12.4   3.88  )-----------( 270      ( 10 Mar 2023 07:29 )             43.2                         12.5   7.15  )-----------( 280      ( 09 Mar 2023 21:48 )             42.6     10 Mar 2023 07:29    140    |  102    |  36     ----------------------------<  185    4.8     |  33     |  1.07   09 Mar 2023 21:48    138    |  100    |  30     ----------------------------<  128    4.7     |  33     |  1.11     Ca    9.3        10 Mar 2023 07:29  Ca    9.3        09 Mar 2023 21:48    TPro  7.6    /  Alb  3.4    /  TBili  0.5    /  DBili  x      /  AST  34     /  ALT  27     /  AlkPhos  99     09 Mar 2023 21:48      Radiology: reviewed     < from: TTE Echo Complete w/ Contrast w/ Doppler (03.10.23 @ 10:02) >   Impression     Summary     Endocardium is not well visualized, however, overall left ventricular   systolic function appears normal. Technically Difficult Study.   Septal flattening is seen; this finding is consistent with right heart   pressure / volume overload.   Estimated left ventricular ejection fraction is 55-60 %.   Normal appearing left atrium.   The right atrium appears moderately dilated.   The right ventricle is severely dilated.   The right ventricular apex appears hypokinetic.   The aortic valve is trileaflet with thin pliable leaflets.   Moderate mitral annular calcification is present.   There is calcification of mitral valve leaflets. The leaflet opening is   normal.   EA reversal of the mitral inflow consistent with reduced compliance of   the   left ventricle.   Trace mitral regurgitation is present.   The tricuspid valve leaflets are thin and pliable; valve motion is   normal.   Moderate (2+) tricuspid valve regurgitation is present.   Severe pulmonary hypertension.   Pulmonic valve not well seen.   No evidence of pericardial effusion.   No evidence of pleural effusion.   IVC is dilated and not collapsing with inspiration.    < end of copied text >

## 2023-03-12 LAB
ANION GAP SERPL CALC-SCNC: 1 MMOL/L — LOW (ref 5–17)
BUN SERPL-MCNC: 61 MG/DL — HIGH (ref 7–23)
CALCIUM SERPL-MCNC: 9 MG/DL — SIGNIFICANT CHANGE UP (ref 8.5–10.1)
CHLORIDE SERPL-SCNC: 101 MMOL/L — SIGNIFICANT CHANGE UP (ref 96–108)
CO2 SERPL-SCNC: 33 MMOL/L — HIGH (ref 22–31)
CREAT SERPL-MCNC: 1.35 MG/DL — HIGH (ref 0.5–1.3)
EGFR: 43 ML/MIN/1.73M2 — LOW
GLUCOSE BLDC GLUCOMTR-MCNC: 210 MG/DL — HIGH (ref 70–99)
GLUCOSE BLDC GLUCOMTR-MCNC: 237 MG/DL — HIGH (ref 70–99)
GLUCOSE BLDC GLUCOMTR-MCNC: 239 MG/DL — HIGH (ref 70–99)
GLUCOSE BLDC GLUCOMTR-MCNC: 265 MG/DL — HIGH (ref 70–99)
GLUCOSE SERPL-MCNC: 210 MG/DL — HIGH (ref 70–99)
NT-PROBNP SERPL-SCNC: 9565 PG/ML — HIGH (ref 0–125)
POTASSIUM SERPL-MCNC: 5 MMOL/L — SIGNIFICANT CHANGE UP (ref 3.5–5.3)
POTASSIUM SERPL-SCNC: 5 MMOL/L — SIGNIFICANT CHANGE UP (ref 3.5–5.3)
SODIUM SERPL-SCNC: 135 MMOL/L — SIGNIFICANT CHANGE UP (ref 135–145)

## 2023-03-12 PROCEDURE — 99233 SBSQ HOSP IP/OBS HIGH 50: CPT

## 2023-03-12 RX ORDER — FUROSEMIDE 40 MG
40 TABLET ORAL
Refills: 0 | Status: DISCONTINUED | OUTPATIENT
Start: 2023-03-12 | End: 2023-03-13

## 2023-03-12 RX ADMIN — BUDESONIDE AND FORMOTEROL FUMARATE DIHYDRATE 2 PUFF(S): 160; 4.5 AEROSOL RESPIRATORY (INHALATION) at 08:22

## 2023-03-12 RX ADMIN — AZITHROMYCIN 500 MILLIGRAM(S): 500 TABLET, FILM COATED ORAL at 09:42

## 2023-03-12 RX ADMIN — Medication 50 MILLIGRAM(S): at 09:42

## 2023-03-12 RX ADMIN — ENOXAPARIN SODIUM 40 MILLIGRAM(S): 100 INJECTION SUBCUTANEOUS at 09:41

## 2023-03-12 RX ADMIN — PREGABALIN 1000 MICROGRAM(S): 225 CAPSULE ORAL at 09:41

## 2023-03-12 RX ADMIN — MAGNESIUM OXIDE 400 MG ORAL TABLET 400 MILLIGRAM(S): 241.3 TABLET ORAL at 09:41

## 2023-03-12 RX ADMIN — ATORVASTATIN CALCIUM 20 MILLIGRAM(S): 80 TABLET, FILM COATED ORAL at 23:10

## 2023-03-12 RX ADMIN — Medication 2: at 11:44

## 2023-03-12 RX ADMIN — Medication 1000 UNIT(S): at 09:41

## 2023-03-12 RX ADMIN — Medication 30 MILLIGRAM(S): at 23:10

## 2023-03-12 RX ADMIN — AMLODIPINE BESYLATE 2.5 MILLIGRAM(S): 2.5 TABLET ORAL at 09:42

## 2023-03-12 RX ADMIN — Medication 2: at 07:33

## 2023-03-12 RX ADMIN — Medication 81 MILLIGRAM(S): at 09:42

## 2023-03-12 RX ADMIN — SPIRONOLACTONE 25 MILLIGRAM(S): 25 TABLET, FILM COATED ORAL at 09:42

## 2023-03-12 RX ADMIN — Medication 3: at 17:01

## 2023-03-12 RX ADMIN — TIOTROPIUM BROMIDE 2 PUFF(S): 18 CAPSULE ORAL; RESPIRATORY (INHALATION) at 08:22

## 2023-03-12 RX ADMIN — Medication 600 MILLIGRAM(S): at 23:47

## 2023-03-12 RX ADMIN — Medication 30 MILLIGRAM(S): at 09:41

## 2023-03-12 RX ADMIN — LOSARTAN POTASSIUM 100 MILLIGRAM(S): 100 TABLET, FILM COATED ORAL at 09:41

## 2023-03-12 RX ADMIN — Medication 40 MILLIGRAM(S): at 14:18

## 2023-03-12 RX ADMIN — Medication 600 MILLIGRAM(S): at 09:41

## 2023-03-12 RX ADMIN — BUDESONIDE AND FORMOTEROL FUMARATE DIHYDRATE 2 PUFF(S): 160; 4.5 AEROSOL RESPIRATORY (INHALATION) at 20:04

## 2023-03-12 NOTE — PHYSICAL THERAPY INITIAL EVALUATION ADULT - MODALITIES TREATMENT COMMENTS
Minimal SOB post bout. Rollator RW left in room for use with staff.  Patient  left in chair with CBIR and chair alarm active. O2 and pf in place.  RN informed of session/status.

## 2023-03-12 NOTE — PHYSICAL THERAPY INITIAL EVALUATION ADULT - ACTIVE RANGE OF MOTION EXAMINATION, REHAB EVAL
B shoulder FF and hips/knees to 90*/bilateral upper extremity Active ROM was WFL (within functional limits)/bilateral  lower extremity Active ROM was WFL (within functional limits)/deficits as listed below

## 2023-03-12 NOTE — PHYSICAL THERAPY INITIAL EVALUATION ADULT - GENERAL OBSERVATIONS, REHAB EVAL
Patient received out of bed in chair on 3E, +PrimaFit, +O2@3L via nc. Patient had her sac at bedside.  Denied pain.

## 2023-03-13 LAB
ANION GAP SERPL CALC-SCNC: 2 MMOL/L — LOW (ref 5–17)
BUN SERPL-MCNC: 68 MG/DL — HIGH (ref 7–23)
CALCIUM SERPL-MCNC: 9.5 MG/DL — SIGNIFICANT CHANGE UP (ref 8.5–10.1)
CHLORIDE SERPL-SCNC: 103 MMOL/L — SIGNIFICANT CHANGE UP (ref 96–108)
CO2 SERPL-SCNC: 34 MMOL/L — HIGH (ref 22–31)
CREAT SERPL-MCNC: 1.25 MG/DL — SIGNIFICANT CHANGE UP (ref 0.5–1.3)
EGFR: 47 ML/MIN/1.73M2 — LOW
GLUCOSE BLDC GLUCOMTR-MCNC: 222 MG/DL — HIGH (ref 70–99)
GLUCOSE BLDC GLUCOMTR-MCNC: 281 MG/DL — HIGH (ref 70–99)
GLUCOSE BLDC GLUCOMTR-MCNC: 286 MG/DL — HIGH (ref 70–99)
GLUCOSE BLDC GLUCOMTR-MCNC: 306 MG/DL — HIGH (ref 70–99)
GLUCOSE SERPL-MCNC: 237 MG/DL — HIGH (ref 70–99)
POTASSIUM SERPL-MCNC: 5.6 MMOL/L — HIGH (ref 3.5–5.3)
POTASSIUM SERPL-SCNC: 5.6 MMOL/L — HIGH (ref 3.5–5.3)
SODIUM SERPL-SCNC: 139 MMOL/L — SIGNIFICANT CHANGE UP (ref 135–145)

## 2023-03-13 PROCEDURE — 99233 SBSQ HOSP IP/OBS HIGH 50: CPT

## 2023-03-13 PROCEDURE — 99232 SBSQ HOSP IP/OBS MODERATE 35: CPT

## 2023-03-13 RX ORDER — FUROSEMIDE 40 MG
40 TABLET ORAL
Refills: 0 | Status: DISCONTINUED | OUTPATIENT
Start: 2023-03-13 | End: 2023-03-14

## 2023-03-13 RX ADMIN — Medication 40 MILLIGRAM(S): at 05:52

## 2023-03-13 RX ADMIN — LOSARTAN POTASSIUM 100 MILLIGRAM(S): 100 TABLET, FILM COATED ORAL at 09:26

## 2023-03-13 RX ADMIN — PREGABALIN 1000 MICROGRAM(S): 225 CAPSULE ORAL at 09:26

## 2023-03-13 RX ADMIN — Medication 40 MILLIGRAM(S): at 17:22

## 2023-03-13 RX ADMIN — AZITHROMYCIN 500 MILLIGRAM(S): 500 TABLET, FILM COATED ORAL at 09:25

## 2023-03-13 RX ADMIN — BUDESONIDE AND FORMOTEROL FUMARATE DIHYDRATE 2 PUFF(S): 160; 4.5 AEROSOL RESPIRATORY (INHALATION) at 08:52

## 2023-03-13 RX ADMIN — Medication 50 MILLIGRAM(S): at 09:26

## 2023-03-13 RX ADMIN — Medication 3: at 12:53

## 2023-03-13 RX ADMIN — Medication 1000 UNIT(S): at 09:26

## 2023-03-13 RX ADMIN — Medication 2: at 21:00

## 2023-03-13 RX ADMIN — Medication 81 MILLIGRAM(S): at 09:25

## 2023-03-13 RX ADMIN — Medication 30 MILLIGRAM(S): at 09:49

## 2023-03-13 RX ADMIN — Medication 3 MILLIGRAM(S): at 21:04

## 2023-03-13 RX ADMIN — Medication 3: at 17:22

## 2023-03-13 RX ADMIN — ENOXAPARIN SODIUM 40 MILLIGRAM(S): 100 INJECTION SUBCUTANEOUS at 09:25

## 2023-03-13 RX ADMIN — Medication 600 MILLIGRAM(S): at 09:26

## 2023-03-13 RX ADMIN — AMLODIPINE BESYLATE 2.5 MILLIGRAM(S): 2.5 TABLET ORAL at 09:26

## 2023-03-13 RX ADMIN — TIOTROPIUM BROMIDE 2 PUFF(S): 18 CAPSULE ORAL; RESPIRATORY (INHALATION) at 08:52

## 2023-03-13 RX ADMIN — ATORVASTATIN CALCIUM 20 MILLIGRAM(S): 80 TABLET, FILM COATED ORAL at 21:04

## 2023-03-13 RX ADMIN — MAGNESIUM OXIDE 400 MG ORAL TABLET 400 MILLIGRAM(S): 241.3 TABLET ORAL at 09:27

## 2023-03-13 RX ADMIN — SPIRONOLACTONE 25 MILLIGRAM(S): 25 TABLET, FILM COATED ORAL at 09:26

## 2023-03-13 RX ADMIN — Medication 600 MILLIGRAM(S): at 21:04

## 2023-03-13 RX ADMIN — Medication 2: at 08:08

## 2023-03-13 NOTE — PROGRESS NOTE ADULT - PROBLEM SELECTOR PROBLEM 3
History of left bundle branch block (LBBB)

## 2023-03-13 NOTE — PROGRESS NOTE ADULT - PROBLEM SELECTOR PLAN 3
·  Problem: History of left bundle branch block (LBBB).   EKG - no changes from previous

## 2023-03-13 NOTE — PROGRESS NOTE ADULT - PROBLEM SELECTOR PLAN 1
·  Problem: Acute on chronic diastolic congestive heart failure.   ·  Recommendation: pt; with known HFpEF - follows with cardiology in [place] - Dr. Joiner, now with PAINTER due to acute on chronic HFpEF exacerbation and COPD exacerbation; elevated BNP 8881 -> 9566, Echo shows preserved LVEF 55-60%, with severely dilated RV, severe pHTN and moderate TR Plan: cont. diuresis with lasix 40 mg ivp bid - switch to po tomororw , continue GDMT with BB/ARB/MRA, daily weight, strict I&Os, fluid restriction 1.5L/day.
·  Problem: Acute on chronic diastolic congestive heart failure.   ·  Recommendation: pt; with known HFpEF - follows with cardiology in [place] - Dr. Joiner, now with PAINTER due to acute on chronic HFpEF exacerbation and COPD exacerbation; elevated BNP 8881 -> 9566, Echo shows preserved LVEF 55-60%, with severely dilated RV, severe pHTN and moderate TR Plan: cont. diuresis with lasix 40 mg ivp bid, continue GDMT with BB/ARB/MRA, daily weight, strict I&Os, fluid restriction 1.5L/day.
·  Problem: Acute on chronic diastolic congestive heart failure.   ·  Recommendation: pt; with known HFpEF - follows with cardiology in [place] - Dr. Joiner, now with PAINTER due to acute on chronic HFpEF exacerbation and COPD exacerbation; elevated BNP 8881 - will recheck on Sunday, Echo shows preserved LVEF 55-60%, with severely dilated RV, severe pHTN and moderate TR Plan: diuresis with lasix 40 mg ivp bid, continue GDMT with BB/ARB/MRA, daily weight, strict I&Os, fluid restriction 1.5L/day.

## 2023-03-13 NOTE — PROGRESS NOTE ADULT - PROBLEM SELECTOR PROBLEM 1
Acute on chronic diastolic congestive heart failure
detailed exam

## 2023-03-13 NOTE — PROGRESS NOTE ADULT - PROBLEM SELECTOR PLAN 2
·  Problem: Benign essential HTN.   ·  Recommendation: Controlled on current regimen.

## 2023-03-14 ENCOUNTER — TRANSCRIPTION ENCOUNTER (OUTPATIENT)
Age: 68
End: 2023-03-14

## 2023-03-14 VITALS
DIASTOLIC BLOOD PRESSURE: 68 MMHG | RESPIRATION RATE: 18 BRPM | HEART RATE: 77 BPM | SYSTOLIC BLOOD PRESSURE: 122 MMHG | TEMPERATURE: 98 F | OXYGEN SATURATION: 94 %

## 2023-03-14 LAB
GLUCOSE BLDC GLUCOMTR-MCNC: 169 MG/DL — HIGH (ref 70–99)
GLUCOSE BLDC GLUCOMTR-MCNC: 177 MG/DL — HIGH (ref 70–99)
GLUCOSE BLDC GLUCOMTR-MCNC: 306 MG/DL — HIGH (ref 70–99)

## 2023-03-14 PROCEDURE — 99239 HOSP IP/OBS DSCHRG MGMT >30: CPT

## 2023-03-14 RX ORDER — FUROSEMIDE 40 MG
1 TABLET ORAL
Qty: 60 | Refills: 0
Start: 2023-03-14

## 2023-03-14 RX ORDER — FUROSEMIDE 40 MG
1 TABLET ORAL
Qty: 0 | Refills: 0 | DISCHARGE

## 2023-03-14 RX ORDER — SPIRONOLACTONE 25 MG/1
1 TABLET, FILM COATED ORAL
Qty: 0 | Refills: 0 | DISCHARGE

## 2023-03-14 RX ADMIN — AMLODIPINE BESYLATE 2.5 MILLIGRAM(S): 2.5 TABLET ORAL at 10:02

## 2023-03-14 RX ADMIN — BUDESONIDE AND FORMOTEROL FUMARATE DIHYDRATE 2 PUFF(S): 160; 4.5 AEROSOL RESPIRATORY (INHALATION) at 08:19

## 2023-03-14 RX ADMIN — LOSARTAN POTASSIUM 100 MILLIGRAM(S): 100 TABLET, FILM COATED ORAL at 10:03

## 2023-03-14 RX ADMIN — Medication 1: at 13:04

## 2023-03-14 RX ADMIN — Medication 1: at 08:31

## 2023-03-14 RX ADMIN — Medication 40 MILLIGRAM(S): at 06:22

## 2023-03-14 RX ADMIN — MAGNESIUM OXIDE 400 MG ORAL TABLET 400 MILLIGRAM(S): 241.3 TABLET ORAL at 10:04

## 2023-03-14 RX ADMIN — Medication 600 MILLIGRAM(S): at 10:02

## 2023-03-14 RX ADMIN — Medication 1000 UNIT(S): at 10:04

## 2023-03-14 RX ADMIN — Medication 30 MILLIGRAM(S): at 10:03

## 2023-03-14 RX ADMIN — Medication 40 MILLIGRAM(S): at 14:22

## 2023-03-14 RX ADMIN — Medication 4: at 17:39

## 2023-03-14 RX ADMIN — TIOTROPIUM BROMIDE 2 PUFF(S): 18 CAPSULE ORAL; RESPIRATORY (INHALATION) at 08:18

## 2023-03-14 RX ADMIN — Medication 50 MILLIGRAM(S): at 10:02

## 2023-03-14 RX ADMIN — PREGABALIN 1000 MICROGRAM(S): 225 CAPSULE ORAL at 10:02

## 2023-03-14 RX ADMIN — Medication 81 MILLIGRAM(S): at 10:03

## 2023-03-14 RX ADMIN — ENOXAPARIN SODIUM 40 MILLIGRAM(S): 100 INJECTION SUBCUTANEOUS at 10:03

## 2023-03-14 NOTE — DISCHARGE NOTE PROVIDER - HOSPITAL COURSE
History of Present Illness:   68 yo female with PMH of COPD (on nocturnal home O2), DM2, pulm HTN, LBBB, HTN, HLD presents to the ED with dyspnea. Pt states for the past 3 weeks she has been feeling unwell. Complains of dyspnea on exertion. Has a chronic cough productive of clear sputum and sometimes brownish which is unchanged secondary to her COPD. No fevers. No chest pain. She has noticed a 25 lb weight gain. She admits to dietary indiscretions with drinking increased fluids at home. She has been trying to adjust her diuretics on her own at home (lasix and aldactone) but symptoms did not improve. Denies any headaches, visual changes, CP, abd pain, N/V/D, dysuria.   In the ED patient initially hypoxic to 60s. Placed on BIPAP. She was given lasix and solumedrol with improvement of symptoms. She is currently off BIPAP and feeling better. Seen by ICU as well.   -found to have acute Rt Chf and AECopd    3.11: no cp, dyspnea improving , less coughing   3.12: LE edema improving, +mild wheezing today, no coughing   3.13: good urine output, LE edema improving   3.14: good urine output, feels better, wants to go home         REVIEW OF SYSTEMS:    CONSTITUTIONAL: No weakness, No fevers or chills  ENT: No ear ache, No sorethroat  NECK: No pain, No stiffness  RESPIRATORY: No cough, No wheezing, No hemoptysis; No dyspnea  CARDIOVASCULAR: No chest pain, No palpitations  GASTROINTESTINAL: No abd pain, No nausea, No vomiting, No hematemesis, No diarrhea or constipation. No melena, No hematochezia.  GENITOURINARY: No dysuria, No  hematuria  NEUROLOGICAL: No diplopia, No paresthesia, No motor dysfunction  MUSCULOSKELETAL: No arthralgia, No myalgia  SKIN: No rashes, or lesions   PSYCH: no anxiety, no suicidal ideation    All other review of systems is negative unless indicated above    Vital Signs Last 24 Hrs  T(C): 36.7 (14 Mar 2023 08:25), Max: 36.8 (13 Mar 2023 22:27)  T(F): 98 (14 Mar 2023 08:25), Max: 98.2 (13 Mar 2023 22:27)  HR: 72 (14 Mar 2023 08:25) (72 - 85)  BP: 134/72 (14 Mar 2023 08:25) (108/76 - 134/72)  RR: 19 (14 Mar 2023 08:25) (18 - 19)  SpO2: 92% (14 Mar 2023 08:25) (92% - 95%)    Parameters below as of 14 Mar 2023 08:25  Patient On (Oxygen Delivery Method): nasal cannula  O2 Flow (L/min): 4        PHYSICAL EXAM:    GENERAL: NAD  HEENT:  NC/AT, EOMI, PERRLA, No scleral icterus, Moist mucous membranes  NECK: Supple, No JVD  CNS:  Alert & Oriented X3, Motor Strength 5/5 B/L upper and lower extremities; DTRs 2+ intact   LUNG: decreased Breath sounds, mild bilat rales , mild wheezing   HEART: RRR; No murmurs, No rubs  ABDOMEN: +BS, ST/ND/NT  GENITOURINARY: Voiding, Bladder not distended  EXTREMITIES:  2+ Peripheral Pulses, No clubbing, No cyanosis, ++ tibial and pedal  edema  MUSCULOSKELTAL: Joints normal ROM, No TTP, No effusion  SKIN: no rashes  RECTAL: deferred, not indicated  BREAST: deferred    Assessment    68 yo female with PMH of COPD (on nocturnal home O2), DM2, pulm HTN, LBBB, HTN, HLD admitted with:    Acute hypoxic and hypercapnic  respiratory failure POA    1. Acute diastolic Rt Chf:  Severe pulm Htn  c/w Lasix 40mg po Bid  f/u Cr, K in one week as outpatient   d/c Aldactone due to hyperkalemia     2. COPD exacerbation  - ABG noted, hypoxia and hypercapnia   - Prednisone taper as outpatient   - symbicort, spiriva, albuterol, mucinex   - pulm consult noted  - s/p BIPAP, may continue as needed  - O2 supplementation via NC  s/p Zithromax     3. DM2  resume oral meds

## 2023-03-14 NOTE — DISCHARGE NOTE PROVIDER - CARE PROVIDER_API CALL
Cardiology,   Phone: (   )    -  Fax: (   )    -  Follow Up Time: 1 week    Pulmonary,   Phone: (   )    -  Fax: (   )    -  Follow Up Time: 2 weeks

## 2023-03-14 NOTE — DISCHARGE NOTE PROVIDER - PROVIDER TOKENS
FREE:[LAST:[Cardiology],PHONE:[(   )    -],FAX:[(   )    -],FOLLOWUP:[1 week]],FREE:[LAST:[Pulmonary],PHONE:[(   )    -],FAX:[(   )    -],FOLLOWUP:[2 weeks]]

## 2023-03-14 NOTE — DISCHARGE NOTE PROVIDER - NSDCMRMEDTOKEN_GEN_ALL_CORE_FT
Advair Diskus 250 mcg-50 mcg inhalation powder: 1 puff(s) inhaled 2 times a day  Albuterol (Eqv-Proventil HFA) 90 mcg/inh inhalation aerosol: 2 puff(s) inhaled every 6 hours  Aspirin Enteric Coated 81 mg oral delayed release tablet: 1 tab(s) orally once a day  atorvastatin 20 mg oral tablet: 1 tab(s) orally once a day  cyanocobalamin 1000 mcg oral tablet: 1 tab(s) orally once a day  furosemide 40 mg oral tablet: 1 tab(s) orally 2 times a day  losartan-hydroCHLOROthiazide 100 mg-12.5 mg oral tablet: 1 tab(s) orally once a day  magnesium oxide 500 mg oral tablet: 1 tab(s) orally once a day  metFORMIN 500 mg oral tablet, extended release: 4 tab(s) orally once a day  metoprolol succinate 50 mg oral tablet, extended release: 1 tab(s) orally once a day  nisoldipine 8.5 mg oral tablet, extended release: 1 tab(s) orally once a day  predniSONE 10 mg oral tablet: 3 tab(s) orally once a day x 2d  2tb po daily x 2d  1tb po daily x 2d  Spiriva 18 mcg inhalation capsule: 1 cap(s) inhaled once a day  Trulicity Pen 1.5 mg/0.5 mL subcutaneous solution: 1 application subcutaneous every 7 days  Vitamin D3 25 mcg (1000 intl units) oral tablet: 1 tab(s) orally once a day

## 2023-03-14 NOTE — DISCHARGE NOTE PROVIDER - NSDCPNSUBOBJ_GEN_ALL_CORE
History of Present Illness:   66 yo female with PMH of COPD (on nocturnal home O2), DM2, pulm HTN, LBBB, HTN, HLD presents to the ED with dyspnea. Pt states for the past 3 weeks she has been feeling unwell. Complains of dyspnea on exertion. Has a chronic cough productive of clear sputum and sometimes brownish which is unchanged secondary to her COPD. No fevers. No chest pain. She has noticed a 25 lb weight gain. She admits to dietary indiscretions with drinking increased fluids at home. She has been trying to adjust her diuretics on her own at home (lasix and aldactone) but symptoms did not improve. Denies any headaches, visual changes, CP, abd pain, N/V/D, dysuria.   In the ED patient initially hypoxic to 60s. Placed on BIPAP. She was given lasix and solumedrol with improvement of symptoms. She is currently off BIPAP and feeling better. Seen by ICU as well.   -found to have acute Rt Chf and AECopd    3.11: no cp, dyspnea improving , less coughing   3.12: LE edema improving, +mild wheezing today, no coughing   3.13: good urine output, LE edema improving   3.14: good urine output, feels better, wants to go home         REVIEW OF SYSTEMS:    CONSTITUTIONAL: No weakness, No fevers or chills  ENT: No ear ache, No sorethroat  NECK: No pain, No stiffness  RESPIRATORY: No cough, No wheezing, No hemoptysis; No dyspnea  CARDIOVASCULAR: No chest pain, No palpitations  GASTROINTESTINAL: No abd pain, No nausea, No vomiting, No hematemesis, No diarrhea or constipation. No melena, No hematochezia.  GENITOURINARY: No dysuria, No  hematuria  NEUROLOGICAL: No diplopia, No paresthesia, No motor dysfunction  MUSCULOSKELETAL: No arthralgia, No myalgia  SKIN: No rashes, or lesions   PSYCH: no anxiety, no suicidal ideation    All other review of systems is negative unless indicated above    Vital Signs Last 24 Hrs  T(C): 36.7 (14 Mar 2023 08:25), Max: 36.8 (13 Mar 2023 22:27)  T(F): 98 (14 Mar 2023 08:25), Max: 98.2 (13 Mar 2023 22:27)  HR: 72 (14 Mar 2023 08:25) (72 - 85)  BP: 134/72 (14 Mar 2023 08:25) (108/76 - 134/72)  RR: 19 (14 Mar 2023 08:25) (18 - 19)  SpO2: 92% (14 Mar 2023 08:25) (92% - 95%)    Parameters below as of 14 Mar 2023 08:25  Patient On (Oxygen Delivery Method): nasal cannula  O2 Flow (L/min): 4        PHYSICAL EXAM:    GENERAL: NAD  HEENT:  NC/AT, EOMI, PERRLA, No scleral icterus, Moist mucous membranes  NECK: Supple, No JVD  CNS:  Alert & Oriented X3, Motor Strength 5/5 B/L upper and lower extremities; DTRs 2+ intact   LUNG: decreased Breath sounds, mild bilat rales , mild wheezing   HEART: RRR; No murmurs, No rubs  ABDOMEN: +BS, ST/ND/NT  GENITOURINARY: Voiding, Bladder not distended  EXTREMITIES:  2+ Peripheral Pulses, No clubbing, No cyanosis, ++ tibial and pedal  edema  MUSCULOSKELTAL: Joints normal ROM, No TTP, No effusion  SKIN: no rashes  RECTAL: deferred, not indicated  BREAST: deferred    Assessment    66 yo female with PMH of COPD (on nocturnal home O2), DM2, pulm HTN, LBBB, HTN, HLD admitted with:    Acute hypoxic and hypercapnic  respiratory failure POA    1. Acute diastolic Rt Chf:  Severe pulm Htn  c/w Lasix 40mg po Bid  f/u Cr, K in one week as outpatient   d/c Aldactone due to hyperkalemia     2. COPD exacerbation  - ABG noted, hypoxia and hypercapnia   - Prednisone taper as outpatient   - symbicort, spiriva, albuterol, mucinex   - pulm consult noted  - s/p BIPAP, may continue as needed  - O2 supplementation via NC  s/p Zithromax     3. DM2  resume oral meds

## 2023-03-14 NOTE — DISCHARGE NOTE PROVIDER - NSDCCPCAREPLAN_GEN_ALL_CORE_FT
PRINCIPAL DISCHARGE DIAGNOSIS  Diagnosis: CHF exacerbation  Assessment and Plan of Treatment: c/w Lasix , recheck kidney function and electrolytes in one week      SECONDARY DISCHARGE DIAGNOSES  Diagnosis: COPD exacerbation  Assessment and Plan of Treatment:

## 2023-03-14 NOTE — PROGRESS NOTE ADULT - SUBJECTIVE AND OBJECTIVE BOX
CHIEF COMPLAINT: Patient is a 67y old  Female who presents with a chief complaint of SOB (10 Mar 2023 04:09)      HPI:  68 yo female with PMH of COPD (on nocturnal home O2), DM2, pulm HTN, LBBB, HTN, HLD presents to the ED with dyspnea. Pt states for the past 3 weeks she has been feeling unwell. Complains of dyspnea on exertion. Has a chronic cough productive of clear sputum and sometimes brownish which is unchanged secondary to her COPD. No fevers. No chest pain. She has noticed a 25 lb weight gain. She admits to dietary indiscretions with drinking increased fluids at home. She has been trying to adjust her diuretics on her own at home (lasix and aldactone) but symptoms did not improve. Denies any headaches, visual changes, CP, abd pain, N/V/D, dysuria.   In the ED patient initially hypoxic to 60s. Placed on BIPAP. She was given lasix and solumedrol with improvement of symptoms. She is currently off BIPAP and feeling better. Seen by ICU as well.  (10 Mar 2023 04:09)  Cardiology consulted to evaluate for CHF. Pt.'s cardiologist is Dr. Rhys Erazo, pt. with known HFpEF and COPD, Echo with preserved LVEF 55-60%, needs diuresis      3/11/23: pt. denies chest pain, less PAINTER, Tele: NSR 70-80, PVCs  3/12/23: no complaints: Tele: NSR 60-79, occasional PVCs, continue diuresis with lasix 40 mg ivp bid and spironolactone   3/13/23: feels good, no SOB, off tele    MEDICATIONS  (STANDING):  amLODIPine   Tablet 2.5 milliGRAM(s) Oral daily  aspirin enteric coated 81 milliGRAM(s) Oral daily  atorvastatin 20 milliGRAM(s) Oral at bedtime  budesonide 160 MICROgram(s)/formoterol 4.5 MICROgram(s) Inhaler 2 Puff(s) Inhalation two times a day  cholecalciferol 1000 Unit(s) Oral daily  cyanocobalamin 1000 MICROGram(s) Oral daily  dextrose 5%. 1000 milliLiter(s) (100 mL/Hr) IV Continuous <Continuous>  dextrose 5%. 1000 milliLiter(s) (50 mL/Hr) IV Continuous <Continuous>  dextrose 50% Injectable 25 Gram(s) IV Push once  dextrose 50% Injectable 12.5 Gram(s) IV Push once  dextrose 50% Injectable 25 Gram(s) IV Push once  enoxaparin Injectable 40 milliGRAM(s) SubCutaneous every 24 hours  furosemide    Tablet 40 milliGRAM(s) Oral two times a day  glucagon  Injectable 1 milliGRAM(s) IntraMuscular once  guaiFENesin  milliGRAM(s) Oral every 12 hours  insulin lispro (ADMELOG) corrective regimen sliding scale   SubCutaneous three times a day before meals  insulin lispro (ADMELOG) corrective regimen sliding scale   SubCutaneous at bedtime  losartan 100 milliGRAM(s) Oral daily  magnesium oxide 400 milliGRAM(s) Oral daily  metoprolol succinate ER 50 milliGRAM(s) Oral daily  predniSONE   Tablet 30 milliGRAM(s) Oral daily  spironolactone 25 milliGRAM(s) Oral daily  tiotropium 2.5 MICROgram(s) Inhaler 2 Puff(s) Inhalation daily      MEDICATIONS  (PRN):  acetaminophen     Tablet .. 650 milliGRAM(s) Oral every 6 hours PRN Temp greater or equal to 38C (100.4F), Mild Pain (1 - 3)  albuterol    90 MICROgram(s) HFA Inhaler 2 Puff(s) Inhalation every 6 hours PRN Shortness of Breath and/or Wheezing  aluminum hydroxide/magnesium hydroxide/simethicone Suspension 30 milliLiter(s) Oral every 4 hours PRN Dyspepsia  dextrose Oral Gel 15 Gram(s) Oral once PRN Blood Glucose LESS THAN 70 milliGRAM(s)/deciliter  melatonin 3 milliGRAM(s) Oral at bedtime PRN Insomnia  ondansetron Injectable 4 milliGRAM(s) IV Push every 8 hours PRN Nausea and/or Vomiting    Vital Signs Last 24 Hrs  T(C): 36.6 (13 Mar 2023 08:01), Max: 36.8 (12 Mar 2023 22:44)  T(F): 97.9 (13 Mar 2023 08:01), Max: 98.2 (12 Mar 2023 22:44)  HR: 70 (13 Mar 2023 08:01) (70 - 80)  BP: 130/68 (13 Mar 2023 08:01) (109/62 - 130/68)  BP(mean): --  RR: 19 (13 Mar 2023 08:01) (18 - 19)  SpO2: 91% (13 Mar 2023 08:01) (91% - 96%)    Parameters below as of 13 Mar 2023 08:01  Patient On (Oxygen Delivery Method): nasal cannula  O2 Flow (L/min): 5    PHYSICAL EXAM:  Constitutional: NAD, awake and alert  HEENT:  EOMI,  Pupils round, No oral cyanosis.  Pulmonary: Scattered wheezes   Cardiovascular: S1 and S2, regular rate and rhythm, no Murmurs, gallops or rubs  Gastrointestinal: Bowel Sounds present, soft, nontender.   Lymph: No peripheral edema. No cervical lymphadenopathy.  Neurological: Alert, no focal deficits  Extremities: +2 pedal edema   Skin: No rashes.  Psych:  Mood & affect appropriate    LABS:    03-13    139  |  103  |  68<H>  ----------------------------<  237<H>  5.6<H>   |  34<H>  |  1.25    Ca    9.5      13 Mar 2023 06:42      - TroponinI hsT: <-27.74                            12.4   3.88  )-----------( 270      ( 10 Mar 2023 07:29 )             43.2     03-11    135  |  101  |  48<H>  ----------------------------<  203<H>  5.1   |  33<H>  |  1.48<H>    Ca    8.8      11 Mar 2023 05:28    TPro  7.6  /  Alb  3.4  /  TBili  0.5  /  DBili  x   /  AST  34  /  ALT  27  /  AlkPhos  99  03-09    - TroponinI hsT: <-27.74             12.4   3.88  )-----------( 270      ( 10 Mar 2023 07:29 )             43.2                         12.5   7.15  )-----------( 280      ( 09 Mar 2023 21:48 )             42.6     10 Mar 2023 07:29    140    |  102    |  36     ----------------------------<  185    4.8     |  33     |  1.07   09 Mar 2023 21:48    138    |  100    |  30     ----------------------------<  128    4.7     |  33     |  1.11     Ca    9.3        10 Mar 2023 07:29  Ca    9.3        09 Mar 2023 21:48    TPro  7.6    /  Alb  3.4    /  TBili  0.5    /  DBili  x      /  AST  34     /  ALT  27     /  AlkPhos  99     09 Mar 2023 21:48      Radiology: reviewed     < from: TTE Echo Complete w/ Contrast w/ Doppler (03.10.23 @ 10:02) >   Impression     Summary     Endocardium is not well visualized, however, overall left ventricular   systolic function appears normal. Technically Difficult Study.   Septal flattening is seen; this finding is consistent with right heart   pressure / volume overload.   Estimated left ventricular ejection fraction is 55-60 %.   Normal appearing left atrium.   The right atrium appears moderately dilated.   The right ventricle is severely dilated.   The right ventricular apex appears hypokinetic.   The aortic valve is trileaflet with thin pliable leaflets.   Moderate mitral annular calcification is present.   There is calcification of mitral valve leaflets. The leaflet opening is   normal.   EA reversal of the mitral inflow consistent with reduced compliance of   the   left ventricle.   Trace mitral regurgitation is present.   The tricuspid valve leaflets are thin and pliable; valve motion is   normal.   Moderate (2+) tricuspid valve regurgitation is present.   Severe pulmonary hypertension.   Pulmonic valve not well seen.   No evidence of pericardial effusion.   No evidence of pleural effusion.   IVC is dilated and not collapsing with inspiration.    < end of copied text >          
History of Present Illness:   66 yo female with PMH of COPD (on nocturnal home O2), DM2, pulm HTN, LBBB, HTN, HLD presents to the ED with dyspnea. Pt states for the past 3 weeks she has been feeling unwell. Complains of dyspnea on exertion. Has a chronic cough productive of clear sputum and sometimes brownish which is unchanged secondary to her COPD. No fevers. No chest pain. She has noticed a 25 lb weight gain. She admits to dietary indiscretions with drinking increased fluids at home. She has been trying to adjust her diuretics on her own at home (lasix and aldactone) but symptoms did not improve. Denies any headaches, visual changes, CP, abd pain, N/V/D, dysuria.   In the ED patient initially hypoxic to 60s. Placed on BIPAP. She was given lasix and solumedrol with improvement of symptoms. She is currently off BIPAP and feeling better. Seen by ICU as well.   -found to have acute Rt Chf    3.11: no cp, dyspnea improving , less coughing         REVIEW OF SYSTEMS:    CONSTITUTIONAL: No weakness, No fevers or chills  ENT: No ear ache, No sorethroat  NECK: No pain, No stiffness  RESPIRATORY: No cough, No wheezing, No hemoptysis; No dyspnea  CARDIOVASCULAR: No chest pain, No palpitations  GASTROINTESTINAL: No abd pain, No nausea, No vomiting, No hematemesis, No diarrhea or constipation. No melena, No hematochezia.  GENITOURINARY: No dysuria, No  hematuria  NEUROLOGICAL: No diplopia, No paresthesia, No motor dysfunction  MUSCULOSKELETAL: No arthralgia, No myalgia  SKIN: No rashes, or lesions   PSYCH: no anxiety, no suicidal ideation    All other review of systems is negative unless indicated above    Vital Signs Last 24 Hrs  T(C): 36.6 (11 Mar 2023 07:20), Max: 37.7 (10 Mar 2023 20:23)  T(F): 97.8 (11 Mar 2023 07:20), Max: 99.8 (10 Mar 2023 20:23)  HR: 80 (11 Mar 2023 08:36) (69 - 82)  BP: 104/44 (11 Mar 2023 07:20) (92/40 - 104/50)  RR: 18 (11 Mar 2023 07:20) (18 - 20)  SpO2: 92% (11 Mar 2023 07:20) (90% - 95%)    Parameters below as of 11 Mar 2023 10:07  Patient On (Oxygen Delivery Method): nasal cannula      PHYSICAL EXAM:    GENERAL: NAD  HEENT:  NC/AT, EOMI, PERRLA, No scleral icterus, Moist mucous membranes  NECK: Supple, No JVD  CNS:  Alert & Oriented X3, Motor Strength 5/5 B/L upper and lower extremities; DTRs 2+ intact   LUNG: decreased Breath sounds, mild bilat rales   HEART: RRR; No murmurs, No rubs  ABDOMEN: +BS, ST/ND/NT  GENITOURINARY: Voiding, Bladder not distended  EXTREMITIES:  2+ Peripheral Pulses, No clubbing, No cyanosis, ++ tibial edema  MUSCULOSKELTAL: Joints normal ROM, No TTP, No effusion  SKIN: no rashes  RECTAL: deferred, not indicated  BREAST: deferred               Labs:                        12.4   3.88  )-----------( 270      ( 10 Mar 2023 07:29 )             43.2     03-11    135  |  101  |  48<H>  ----------------------------<  203<H>  5.1   |  33<H>  |  1.48<H>    Ca    8.8      11 Mar 2023 05:28    TPro  7.6  /  Alb  3.4  /  TBili  0.5  /  DBili  x   /  AST  34  /  ALT  27  /  AlkPhos  99  03-09      MEDICATIONS  (STANDING):  amLODIPine   Tablet 2.5 milliGRAM(s) Oral daily  aspirin enteric coated 81 milliGRAM(s) Oral daily  atorvastatin 20 milliGRAM(s) Oral at bedtime  azithromycin   Tablet 500 milliGRAM(s) Oral daily  budesonide 160 MICROgram(s)/formoterol 4.5 MICROgram(s) Inhaler 2 Puff(s) Inhalation two times a day  cholecalciferol 1000 Unit(s) Oral daily  cyanocobalamin 1000 MICROGram(s) Oral daily  enoxaparin Injectable 40 milliGRAM(s) SubCutaneous every 24 hours  furosemide   Injectable 40 milliGRAM(s) IV Push two times a day  glucagon  Injectable 1 milliGRAM(s) IntraMuscular once  guaiFENesin  milliGRAM(s) Oral every 12 hours  insulin lispro (ADMELOG) corrective regimen sliding scale   SubCutaneous three times a day before meals  insulin lispro (ADMELOG) corrective regimen sliding scale   SubCutaneous at bedtime  losartan 100 milliGRAM(s) Oral daily  magnesium oxide 400 milliGRAM(s) Oral daily  methylPREDNISolone sodium succinate Injectable 40 milliGRAM(s) IV Push every 12 hours  metoprolol succinate ER 50 milliGRAM(s) Oral daily  spironolactone 25 milliGRAM(s) Oral daily  tiotropium 2.5 MICROgram(s) Inhaler 2 Puff(s) Inhalation daily    MEDICATIONS  (PRN):  acetaminophen     Tablet .. 650 milliGRAM(s) Oral every 6 hours PRN Temp greater or equal to 38C (100.4F), Mild Pain (1 - 3)  albuterol    90 MICROgram(s) HFA Inhaler 2 Puff(s) Inhalation every 6 hours PRN Shortness of Breath and/or Wheezing  aluminum hydroxide/magnesium hydroxide/simethicone Suspension 30 milliLiter(s) Oral every 4 hours PRN Dyspepsia  dextrose Oral Gel 15 Gram(s) Oral once PRN Blood Glucose LESS THAN 70 milliGRAM(s)/deciliter  melatonin 3 milliGRAM(s) Oral at bedtime PRN Insomnia  ondansetron Injectable 4 milliGRAM(s) IV Push every 8 hours PRN Nausea and/or Vomiting      all labs reviewed  all imaging reviewed        · Assessment	  66 yo female with PMH of COPD (on nocturnal home O2), DM2, pulm HTN, LBBB, HTN, HLD admitted with:    Acute hypoxic respiratory failure POA    1. Acute diastolic Rt Chf:  Severe pulm Htn  c/w IV Lasix Bid  f/u Cr, K    2. COPD exacerbation  - ABG noted  - s/p solumedrol 125mg IV, continue 40mg IV BID  - symbicort, spiriva, albuterol  - pulm consult  - s/p BIPAP, may continue as needed  - O2 supplementation via NC  - Zithromax day#2    3. DM2  - hold metformin  - ISS  - check HbA1c    #DVT prophylaxis  - lovenox
History of Present Illness:   66 yo female with PMH of COPD (on nocturnal home O2), DM2, pulm HTN, LBBB, HTN, HLD presents to the ED with dyspnea. Pt states for the past 3 weeks she has been feeling unwell. Complains of dyspnea on exertion. Has a chronic cough productive of clear sputum and sometimes brownish which is unchanged secondary to her COPD. No fevers. No chest pain. She has noticed a 25 lb weight gain. She admits to dietary indiscretions with drinking increased fluids at home. She has been trying to adjust her diuretics on her own at home (lasix and aldactone) but symptoms did not improve. Denies any headaches, visual changes, CP, abd pain, N/V/D, dysuria.   In the ED patient initially hypoxic to 60s. Placed on BIPAP. She was given lasix and solumedrol with improvement of symptoms. She is currently off BIPAP and feeling better. Seen by ICU as well.   -found to have acute Rt Chf    3.11: no cp, dyspnea improving , less coughing   3.12: LE edema improving, +mild wheezing today, no coughing         REVIEW OF SYSTEMS:    CONSTITUTIONAL: No weakness, No fevers or chills  ENT: No ear ache, No sorethroat  NECK: No pain, No stiffness  RESPIRATORY: No cough, No wheezing, No hemoptysis; No dyspnea  CARDIOVASCULAR: No chest pain, No palpitations  GASTROINTESTINAL: No abd pain, No nausea, No vomiting, No hematemesis, No diarrhea or constipation. No melena, No hematochezia.  GENITOURINARY: No dysuria, No  hematuria  NEUROLOGICAL: No diplopia, No paresthesia, No motor dysfunction  MUSCULOSKELETAL: No arthralgia, No myalgia  SKIN: No rashes, or lesions   PSYCH: no anxiety, no suicidal ideation    All other review of systems is negative unless indicated above    Vital Signs Last 24 Hrs  T(C): 36.7 (12 Mar 2023 09:22), Max: 36.9 (11 Mar 2023 21:08)  T(F): 98 (12 Mar 2023 09:22), Max: 98.4 (11 Mar 2023 21:08)  HR: 72 (12 Mar 2023 09:22) (72 - 81)  BP: 125/59 (12 Mar 2023 09:22) (112/83 - 125/59)  BP(mean): 74 (12 Mar 2023 09:22) (74 - 74)  RR: 18 (12 Mar 2023 09:22) (18 - 18)  SpO2: 86% (12 Mar 2023 09:22) (86% - 93%)    Parameters below as of 11 Mar 2023 21:08  Patient On (Oxygen Delivery Method): nasal cannula  O2 Flow (L/min): 4      PHYSICAL EXAM:    GENERAL: NAD  HEENT:  NC/AT, EOMI, PERRLA, No scleral icterus, Moist mucous membranes  NECK: Supple, No JVD  CNS:  Alert & Oriented X3, Motor Strength 5/5 B/L upper and lower extremities; DTRs 2+ intact   LUNG: decreased Breath sounds, mild bilat rales , mild wheezing   HEART: RRR; No murmurs, No rubs  ABDOMEN: +BS, ST/ND/NT  GENITOURINARY: Voiding, Bladder not distended  EXTREMITIES:  2+ Peripheral Pulses, No clubbing, No cyanosis, ++ tibial edema  MUSCULOSKELTAL: Joints normal ROM, No TTP, No effusion  SKIN: no rashes  RECTAL: deferred, not indicated  BREAST: deferred               Labs:    03-12    135  |  101  |  61<H>  ----------------------------<  210<H>  5.0   |  33<H>  |  1.35<H>    Ca    9.0      12 Mar 2023 06:25        MEDICATIONS  (STANDING):  amLODIPine   Tablet 2.5 milliGRAM(s) Oral daily  aspirin enteric coated 81 milliGRAM(s) Oral daily  atorvastatin 20 milliGRAM(s) Oral at bedtime  azithromycin   Tablet 500 milliGRAM(s) Oral daily  budesonide 160 MICROgram(s)/formoterol 4.5 MICROgram(s) Inhaler 2 Puff(s) Inhalation two times a day  cholecalciferol 1000 Unit(s) Oral daily  cyanocobalamin 1000 MICROGram(s) Oral daily  enoxaparin Injectable 40 milliGRAM(s) SubCutaneous every 24 hours  furosemide   Injectable 40 milliGRAM(s) IV Push two times a day  glucagon  Injectable 1 milliGRAM(s) IntraMuscular once  guaiFENesin  milliGRAM(s) Oral every 12 hours  insulin lispro (ADMELOG) corrective regimen sliding scale   SubCutaneous three times a day before meals  insulin lispro (ADMELOG) corrective regimen sliding scale   SubCutaneous at bedtime  losartan 100 milliGRAM(s) Oral daily  magnesium oxide 400 milliGRAM(s) Oral daily  methylPREDNISolone sodium succinate Injectable 40 milliGRAM(s) IV Push every 12 hours  metoprolol succinate ER 50 milliGRAM(s) Oral daily  spironolactone 25 milliGRAM(s) Oral daily  tiotropium 2.5 MICROgram(s) Inhaler 2 Puff(s) Inhalation daily    MEDICATIONS  (PRN):  acetaminophen     Tablet .. 650 milliGRAM(s) Oral every 6 hours PRN Temp greater or equal to 38C (100.4F), Mild Pain (1 - 3)  albuterol    90 MICROgram(s) HFA Inhaler 2 Puff(s) Inhalation every 6 hours PRN Shortness of Breath and/or Wheezing  aluminum hydroxide/magnesium hydroxide/simethicone Suspension 30 milliLiter(s) Oral every 4 hours PRN Dyspepsia  dextrose Oral Gel 15 Gram(s) Oral once PRN Blood Glucose LESS THAN 70 milliGRAM(s)/deciliter  melatonin 3 milliGRAM(s) Oral at bedtime PRN Insomnia  ondansetron Injectable 4 milliGRAM(s) IV Push every 8 hours PRN Nausea and/or Vomiting      all labs reviewed  all imaging reviewed        · Assessment	  66 yo female with PMH of COPD (on nocturnal home O2), DM2, pulm HTN, LBBB, HTN, HLD admitted with:    Acute hypoxic and hypercapnic  respiratory failure POA    1. Acute diastolic Rt Chf:  Severe pulm Htn  Renal function stable, improving on diuretic  c/w IV Lasix 40mg Bid  f/u Cr, K    2. COPD exacerbation  - ABG noted, hypoxia and hypercapnia   - continue 40mg IV BID  - symbicort, spiriva, albuterol, mucinex   - pulm consult noted  - s/p BIPAP, may continue as needed  - O2 supplementation via NC  - Zithromax day#3    3. DM2  - hold metformin  - ISS  - check HbA1c    #DVT prophylaxis  - lovenox    #GOC:   patient wants to be DNR/DNI
JOHANA MCDERMOTT  MRN: [id number]    S: Chief Complaint: "I was blowing up" - 25 pound weight gain over 3 weeks PTA with increasing leg edema, increasing abdominal girth, and shortness of breath.     HPI: 66 yo female c/o 3 week hx of  increasing leg edema, increasing abdominal girth, and shortness of breath. She denies hx of fever, chills, chest pain, purulent sputum production, or hemoptysis. The patient started taking diuretics (Lasix and aldactone) on her own but her edema did not improve.    The patient has severe COPD with chronic hypoxic respiratory failure on home O2.        3/11/2023: Sitting up in chair. Breathing comfortably. Feels much better. No dyspnea at rest. Edema improving with IV diuresis.      3/12/2023: Continues to report improvement. Denies shortness of breath at rest. Decreased edema.     PAST MEDICAL & SURGICAL HISTORY:  COPD, severe      H/O pulmonary hypertension      Hypertension      History of left bundle branch block (LBBB)      S/P           O: T(C): 36.7 (23 @ 09:22), Max: 36.9 (23 @ 21:08)  HR: 72 (23 @ 09:22) (72 - 81)  BP: 125/59 (23 @ 09:22) (112/83 - 125/59)  RR: 18 (23 @ 09:22) (18 - 18)  SpO2: 86% (23 @ 09:22) (86% - 93%)  Wt(kg): --    PHYSICAL EXAM:      GENERAL: comfortable     NEURO: awake/alert    NECK: no JVD    CHEST: scattered expiratory wheeze; distant    CARDIAC: RR    EXT: edematous      LABS:     Blood Gas Profile - Arterial (03.10.23 @ 03:06)   pH, Arterial: 7.31   pCO2, Arterial: 57 mmHg   pO2, Arterial: 52 mmHg   HCO3, Arterial: 29 mmol/L   Base Excess, Arterial: 1.2 mmol/L   Oxygen Saturation, Arterial: 79 %   Total CO2, Arterial: 30 mmol/L   Blood Gas Comments Arterial: NC 4   Blood Gas Source Arterial: Arterial     Pro-Brain Natriuretic Peptide (23 @ 21:48)   Pro-Brain Natriuretic Peptide: 8881 pg/mL         03-12    135  |  101  |  61<H>  ----------------------------<  210<H>  5.0   |  33<H>  |  1.35<H>    Ca    9.0      12 Mar 2023 06:25    RADIOLOGY:    TTE Echo Complete w/ Contrast w/ Doppler (03.10.23 @ 10:02) >   Impression     Summary     Endocardium is not well visualized, however, overall left ventricular   systolic function appears normal. Technically Difficult Study.   Septal flattening is seen; this finding is consistent with right heart   pressure / volume overload.   Estimated left ventricular ejection fraction is 55-60 %.   Normal appearing left atrium.   The right atrium appears moderately dilated.   The right ventricle is severely dilated.   The right ventricular apex appears hypokinetic.   The aortic valve is trileaflet with thin pliable leaflets.   Moderate mitral annular calcification is present.   There is calcification of mitral valve leaflets. The leaflet opening is   normal.   EA reversal of the mitral inflow consistent with reduced compliance of   the   left ventricle.   Trace mitral regurgitation is present.   The tricuspid valve leaflets are thin and pliable; valve motion is   normal.   Moderate (2+) tricuspid valve regurgitation is present.   Severe pulmonary hypertension.   Pulmonic valve not well seen.   No evidence of pericardial effusion.   No evidence of pleural effusion.   IVC is dilated and not collapsing with inspiration.               EXAM:  XR CHEST PORTABLE IMMED 1V   ORDERED BY: RENATA BUTLER     PROCEDURE DATE:  2023          INTERPRETATION:  INDICATION: Short of breath    Portable chest 9:30 PM    COMPARISON: 2016    Patient rotated. Overlying EKG leads and wires.    FINDINGS:  Heart/Vascular: The mediastinum, hilum and aorta are within normal limits   for projection. Cardiomegaly.  Pulmonary: Midline trachea. There is mild pulmonary venous congestion.   There is no focal consolidation or gross pleural effusion.    Bones: There is no fracture.  Lines and catheter: None    Impression:    Cardiomegaly with mild pulmonary venous congestion.    --- End of Report ---    GENET SWIFT DO; Attending Radiologist      MEDICATIONS  (STANDING):  amLODIPine   Tablet 2.5 milliGRAM(s) Oral daily  aspirin enteric coated 81 milliGRAM(s) Oral daily  atorvastatin 20 milliGRAM(s) Oral at bedtime  azithromycin   Tablet 500 milliGRAM(s) Oral daily  budesonide 160 MICROgram(s)/formoterol 4.5 MICROgram(s) Inhaler 2 Puff(s) Inhalation two times a day  cholecalciferol 1000 Unit(s) Oral daily  cyanocobalamin 1000 MICROGram(s) Oral daily  dextrose 5%. 1000 milliLiter(s) (100 mL/Hr) IV Continuous <Continuous>  dextrose 5%. 1000 milliLiter(s) (50 mL/Hr) IV Continuous <Continuous>  dextrose 50% Injectable 25 Gram(s) IV Push once  dextrose 50% Injectable 12.5 Gram(s) IV Push once  dextrose 50% Injectable 25 Gram(s) IV Push once  enoxaparin Injectable 40 milliGRAM(s) SubCutaneous every 24 hours  glucagon  Injectable 1 milliGRAM(s) IntraMuscular once  guaiFENesin  milliGRAM(s) Oral every 12 hours  insulin lispro (ADMELOG) corrective regimen sliding scale   SubCutaneous three times a day before meals  insulin lispro (ADMELOG) corrective regimen sliding scale   SubCutaneous at bedtime  losartan 100 milliGRAM(s) Oral daily  magnesium oxide 400 milliGRAM(s) Oral daily  methylPREDNISolone sodium succinate Injectable 30 milliGRAM(s) IV Push two times a day  metoprolol succinate ER 50 milliGRAM(s) Oral daily  spironolactone 25 milliGRAM(s) Oral daily  tiotropium 2.5 MICROgram(s) Inhaler 2 Puff(s) Inhalation daily    MEDICATIONS  (PRN):  acetaminophen     Tablet .. 650 milliGRAM(s) Oral every 6 hours PRN Temp greater or equal to 38C (100.4F), Mild Pain (1 - 3)  albuterol    90 MICROgram(s) HFA Inhaler 2 Puff(s) Inhalation every 6 hours PRN Shortness of Breath and/or Wheezing  aluminum hydroxide/magnesium hydroxide/simethicone Suspension 30 milliLiter(s) Oral every 4 hours PRN Dyspepsia  dextrose Oral Gel 15 Gram(s) Oral once PRN Blood Glucose LESS THAN 70 milliGRAM(s)/deciliter  melatonin 3 milliGRAM(s) Oral at bedtime PRN Insomnia  ondansetron Injectable 4 milliGRAM(s) IV Push every 8 hours PRN Nausea and/or Vomiting        A/P: 1. Acute on chronic diastolic congestive heart failure. Responding to diuresis. Per cardiology.     2. Very severe COPD / acute on chronic hypercarbic respiratory failure with hypoxia. Continue inhaled bronchodilators. Decrease steroids. Continue O2 supplement.     3. Pulmonary HTN. Group 2 and group 3 etiology. Optimaze treatment for chronic cardiac and pulmonary disease. Continue supplemental O2.     4. ? CARRINGTON / obesity hypoventilation. Consider outpatient eval for CARRINGTON if patient agrees.       
  CHIEF COMPLAINT: Patient is a 67y old  Female who presents with a chief complaint of SOB (10 Mar 2023 04:09)      HPI:  66 yo female with PMH of COPD (on nocturnal home O2), DM2, pulm HTN, LBBB, HTN, HLD presents to the ED with dyspnea. Pt states for the past 3 weeks she has been feeling unwell. Complains of dyspnea on exertion. Has a chronic cough productive of clear sputum and sometimes brownish which is unchanged secondary to her COPD. No fevers. No chest pain. She has noticed a 25 lb weight gain. She admits to dietary indiscretions with drinking increased fluids at home. She has been trying to adjust her diuretics on her own at home (lasix and aldactone) but symptoms did not improve. Denies any headaches, visual changes, CP, abd pain, N/V/D, dysuria.   In the ED patient initially hypoxic to 60s. Placed on BIPAP. She was given lasix and solumedrol with improvement of symptoms. She is currently off BIPAP and feeling better. Seen by ICU as well.  (10 Mar 2023 04:09)  Cardiology consulted to evaluate for CHF. Pt.'s cardiologist is Dr. Rhys Erazo, pt. with known HFpEF and COPD, Echo with preserved LVEF 55-60%, needs diuresis      3/11/23: pt. denies chest pain, less PAINTER, Tele: NSR 70-80, PVCs  3/12/23: no complaints: Tele: NSR 60-79, occasional PVCs, continue diuresis with lasix 40 mg ivp bid and spironolactone     MEDICATIONS  (STANDING):  amLODIPine   Tablet 2.5 milliGRAM(s) Oral daily  aspirin enteric coated 81 milliGRAM(s) Oral daily  atorvastatin 20 milliGRAM(s) Oral at bedtime  azithromycin   Tablet 500 milliGRAM(s) Oral daily  budesonide 160 MICROgram(s)/formoterol 4.5 MICROgram(s) Inhaler 2 Puff(s) Inhalation two times a day  cholecalciferol 1000 Unit(s) Oral daily  cyanocobalamin 1000 MICROGram(s) Oral daily  dextrose 5%. 1000 milliLiter(s) (100 mL/Hr) IV Continuous <Continuous>  dextrose 5%. 1000 milliLiter(s) (50 mL/Hr) IV Continuous <Continuous>  dextrose 50% Injectable 25 Gram(s) IV Push once  dextrose 50% Injectable 12.5 Gram(s) IV Push once  dextrose 50% Injectable 25 Gram(s) IV Push once  enoxaparin Injectable 40 milliGRAM(s) SubCutaneous every 24 hours  furosemide   Injectable 40 milliGRAM(s) IV Push two times a day  glucagon  Injectable 1 milliGRAM(s) IntraMuscular once  guaiFENesin  milliGRAM(s) Oral every 12 hours  insulin lispro (ADMELOG) corrective regimen sliding scale   SubCutaneous three times a day before meals  insulin lispro (ADMELOG) corrective regimen sliding scale   SubCutaneous at bedtime  losartan 100 milliGRAM(s) Oral daily  magnesium oxide 400 milliGRAM(s) Oral daily  methylPREDNISolone sodium succinate Injectable 30 milliGRAM(s) IV Push two times a day  metoprolol succinate ER 50 milliGRAM(s) Oral daily  spironolactone 25 milliGRAM(s) Oral daily  tiotropium 2.5 MICROgram(s) Inhaler 2 Puff(s) Inhalation daily          MEDICATIONS  (PRN):  acetaminophen     Tablet .. 650 milliGRAM(s) Oral every 6 hours PRN Temp greater or equal to 38C (100.4F), Mild Pain (1 - 3)  albuterol    90 MICROgram(s) HFA Inhaler 2 Puff(s) Inhalation every 6 hours PRN Shortness of Breath and/or Wheezing  aluminum hydroxide/magnesium hydroxide/simethicone Suspension 30 milliLiter(s) Oral every 4 hours PRN Dyspepsia  dextrose Oral Gel 15 Gram(s) Oral once PRN Blood Glucose LESS THAN 70 milliGRAM(s)/deciliter  melatonin 3 milliGRAM(s) Oral at bedtime PRN Insomnia  ondansetron Injectable 4 milliGRAM(s) IV Push every 8 hours PRN Nausea and/or Vomiting      Vital Signs Last 24 Hrs  T(C): 36.6 (11 Mar 2023 07:20), Max: 37.7 (10 Mar 2023 20:23)  T(F): 97.8 (11 Mar 2023 07:20), Max: 99.8 (10 Mar 2023 20:23)  HR: 69 (11 Mar 2023 07:20) (69 - 82)  BP: 104/44 (11 Mar 2023 07:20) (92/40 - 104/50)  BP(mean): --  RR: 18 (11 Mar 2023 07:20) (18 - 20)  SpO2: 92% (11 Mar 2023 07:20) (90% - 95%)    Parameters below as of 10 Mar 2023 21:00  Patient On (Oxygen Delivery Method): nasal cannula  O2 Flow (L/min): 4      PHYSICAL EXAM:  Constitutional: NAD, awake and alert  HEENT:  EOMI,  Pupils round, No oral cyanosis.  Pulmonary: Scattered wheezes   Cardiovascular: S1 and S2, regular rate and rhythm, no Murmurs, gallops or rubs  Gastrointestinal: Bowel Sounds present, soft, nontender.   Lymph: No peripheral edema. No cervical lymphadenopathy.  Neurological: Alert, no focal deficits  Extremities: +2 pedal edema   Skin: No rashes.  Psych:  Mood & affect appropriate    LABS:                            12.4   3.88  )-----------( 270      ( 10 Mar 2023 07:29 )             43.2     03-11    135  |  101  |  48<H>  ----------------------------<  203<H>  5.1   |  33<H>  |  1.48<H>    Ca    8.8      11 Mar 2023 05:28    TPro  7.6  /  Alb  3.4  /  TBili  0.5  /  DBili  x   /  AST  34  /  ALT  27  /  AlkPhos  99  03-09    - TroponinI hsT: <-27.74             12.4   3.88  )-----------( 270      ( 10 Mar 2023 07:29 )             43.2                         12.5   7.15  )-----------( 280      ( 09 Mar 2023 21:48 )             42.6     10 Mar 2023 07:29    140    |  102    |  36     ----------------------------<  185    4.8     |  33     |  1.07   09 Mar 2023 21:48    138    |  100    |  30     ----------------------------<  128    4.7     |  33     |  1.11     Ca    9.3        10 Mar 2023 07:29  Ca    9.3        09 Mar 2023 21:48    TPro  7.6    /  Alb  3.4    /  TBili  0.5    /  DBili  x      /  AST  34     /  ALT  27     /  AlkPhos  99     09 Mar 2023 21:48      Radiology: reviewed     < from: TTE Echo Complete w/ Contrast w/ Doppler (03.10.23 @ 10:02) >   Impression     Summary     Endocardium is not well visualized, however, overall left ventricular   systolic function appears normal. Technically Difficult Study.   Septal flattening is seen; this finding is consistent with right heart   pressure / volume overload.   Estimated left ventricular ejection fraction is 55-60 %.   Normal appearing left atrium.   The right atrium appears moderately dilated.   The right ventricle is severely dilated.   The right ventricular apex appears hypokinetic.   The aortic valve is trileaflet with thin pliable leaflets.   Moderate mitral annular calcification is present.   There is calcification of mitral valve leaflets. The leaflet opening is   normal.   EA reversal of the mitral inflow consistent with reduced compliance of   the   left ventricle.   Trace mitral regurgitation is present.   The tricuspid valve leaflets are thin and pliable; valve motion is   normal.   Moderate (2+) tricuspid valve regurgitation is present.   Severe pulmonary hypertension.   Pulmonic valve not well seen.   No evidence of pericardial effusion.   No evidence of pleural effusion.   IVC is dilated and not collapsing with inspiration.    < end of copied text >          
History of Present Illness:   66 yo female with PMH of COPD (on nocturnal home O2), DM2, pulm HTN, LBBB, HTN, HLD presents to the ED with dyspnea. Pt states for the past 3 weeks she has been feeling unwell. Complains of dyspnea on exertion. Has a chronic cough productive of clear sputum and sometimes brownish which is unchanged secondary to her COPD. No fevers. No chest pain. She has noticed a 25 lb weight gain. She admits to dietary indiscretions with drinking increased fluids at home. She has been trying to adjust her diuretics on her own at home (lasix and aldactone) but symptoms did not improve. Denies any headaches, visual changes, CP, abd pain, N/V/D, dysuria.   In the ED patient initially hypoxic to 60s. Placed on BIPAP. She was given lasix and solumedrol with improvement of symptoms. She is currently off BIPAP and feeling better. Seen by ICU as well.   -found to have acute Rt Chf    3.11: no cp, dyspnea improving , less coughing   3.12: LE edema improving, +mild wheezing today, no coughing   3.13: good urine output, LE edema improving         REVIEW OF SYSTEMS:    CONSTITUTIONAL: No weakness, No fevers or chills  ENT: No ear ache, No sorethroat  NECK: No pain, No stiffness  RESPIRATORY: No cough, No wheezing, No hemoptysis; No dyspnea  CARDIOVASCULAR: No chest pain, No palpitations  GASTROINTESTINAL: No abd pain, No nausea, No vomiting, No hematemesis, No diarrhea or constipation. No melena, No hematochezia.  GENITOURINARY: No dysuria, No  hematuria  NEUROLOGICAL: No diplopia, No paresthesia, No motor dysfunction  MUSCULOSKELETAL: No arthralgia, No myalgia  SKIN: No rashes, or lesions   PSYCH: no anxiety, no suicidal ideation    All other review of systems is negative unless indicated above    Vital Signs Last 24 Hrs  T(C): 36.6 (13 Mar 2023 08:01), Max: 36.8 (12 Mar 2023 22:44)  T(F): 97.9 (13 Mar 2023 08:01), Max: 98.2 (12 Mar 2023 22:44)  HR: 70 (13 Mar 2023 08:01) (70 - 80)  BP: 130/68 (13 Mar 2023 08:01) (109/62 - 130/68)  RR: 19 (13 Mar 2023 08:01) (18 - 19)  SpO2: 91% (13 Mar 2023 08:01) (91% - 96%)    Parameters below as of 13 Mar 2023 08:01  Patient On (Oxygen Delivery Method): nasal cannula  O2 Flow (L/min): 5      PHYSICAL EXAM:    GENERAL: NAD  HEENT:  NC/AT, EOMI, PERRLA, No scleral icterus, Moist mucous membranes  NECK: Supple, No JVD  CNS:  Alert & Oriented X3, Motor Strength 5/5 B/L upper and lower extremities; DTRs 2+ intact   LUNG: decreased Breath sounds, mild bilat rales , mild wheezing   HEART: RRR; No murmurs, No rubs  ABDOMEN: +BS, ST/ND/NT  GENITOURINARY: Voiding, Bladder not distended  EXTREMITIES:  2+ Peripheral Pulses, No clubbing, No cyanosis, ++ tibial edema  MUSCULOSKELTAL: Joints normal ROM, No TTP, No effusion  SKIN: no rashes  RECTAL: deferred, not indicated  BREAST: deferred    Labs:    03-13    139  |  103  |  68<H>  ----------------------------<  237<H>  5.6<H>   |  34<H>  |  1.25    Ca    9.5      13 Mar 2023 06:42             Cultures:         MEDICATIONS  (STANDING):  amLODIPine   Tablet 2.5 milliGRAM(s) Oral daily  aspirin enteric coated 81 milliGRAM(s) Oral daily  atorvastatin 20 milliGRAM(s) Oral at bedtime  azithromycin   Tablet 500 milliGRAM(s) Oral daily  budesonide 160 MICROgram(s)/formoterol 4.5 MICROgram(s) Inhaler 2 Puff(s) Inhalation two times a day  cholecalciferol 1000 Unit(s) Oral daily  cyanocobalamin 1000 MICROGram(s) Oral daily  enoxaparin Injectable 40 milliGRAM(s) SubCutaneous every 24 hours  furosemide   Injectable 40 milliGRAM(s) IV Push two times a day  glucagon  Injectable 1 milliGRAM(s) IntraMuscular once  guaiFENesin  milliGRAM(s) Oral every 12 hours  insulin lispro (ADMELOG) corrective regimen sliding scale   SubCutaneous three times a day before meals  insulin lispro (ADMELOG) corrective regimen sliding scale   SubCutaneous at bedtime  losartan 100 milliGRAM(s) Oral daily  magnesium oxide 400 milliGRAM(s) Oral daily  methylPREDNISolone sodium succinate Injectable 40 milliGRAM(s) IV Push every 12 hours  metoprolol succinate ER 50 milliGRAM(s) Oral daily  spironolactone 25 milliGRAM(s) Oral daily  tiotropium 2.5 MICROgram(s) Inhaler 2 Puff(s) Inhalation daily    MEDICATIONS  (PRN):  acetaminophen     Tablet .. 650 milliGRAM(s) Oral every 6 hours PRN Temp greater or equal to 38C (100.4F), Mild Pain (1 - 3)  albuterol    90 MICROgram(s) HFA Inhaler 2 Puff(s) Inhalation every 6 hours PRN Shortness of Breath and/or Wheezing  aluminum hydroxide/magnesium hydroxide/simethicone Suspension 30 milliLiter(s) Oral every 4 hours PRN Dyspepsia  dextrose Oral Gel 15 Gram(s) Oral once PRN Blood Glucose LESS THAN 70 milliGRAM(s)/deciliter  melatonin 3 milliGRAM(s) Oral at bedtime PRN Insomnia  ondansetron Injectable 4 milliGRAM(s) IV Push every 8 hours PRN Nausea and/or Vomiting      all labs reviewed  all imaging reviewed        · Assessment	  66 yo female with PMH of COPD (on nocturnal home O2), DM2, pulm HTN, LBBB, HTN, HLD admitted with:    Acute hypoxic and hypercapnic  respiratory failure POA    1. Acute diastolic Rt Chf:  Severe pulm Htn  c/w IV Lasix 40mg Bid, BUN increasing , will change to oral diuretic in Am  f/u Cr, K    2. COPD exacerbation  - ABG noted, hypoxia and hypercapnia   - continue 40mg IV BID  - symbicort, spiriva, albuterol, mucinex   - pulm consult noted  - s/p BIPAP, may continue as needed  - O2 supplementation via NC  - Zithromax day#3    3. DM2  - hold metformin  - ISS  - check HbA1c    #DVT prophylaxis  - lovenox    #GOC:   patient wants to be DNR/DNI
JOHANA MCDERMOTT  MRN: [id number]    S: Chief Complaint: "I was blowing up" - 25 pound weight gain over 3 weeks PTA with increasing leg edema, increasing abdominal girth, and shortness of breath.     HPI: 66 yo female c/o 3 week hx of  increasing leg edema, increasing abdominal girth, and shortness of breath. She denies hx of fever, chills, chest pain, purulent sputum production, or hemoptysis. The patient started taking diuretics (Lasix and aldactone) on her own but her edema did not improve.    The patient has severe COPD with chronic hypoxic respiratory failure on home O2.        3/11/2023: Sitting up in chair. Breathing comfortably. Feels much better. No dyspnea at rest. Edema improving with IV diuresis.      3/12/2023: Continues to report improvement. Denies shortness of breath at rest. Decreased edema.     3/13/2023: No complaints. Feels better. Denies shortness of breath.     PAST MEDICAL & SURGICAL HISTORY:  COPD, severe      H/O pulmonary hypertension      Hypertension      History of left bundle branch block (LBBB)      S/P           O: T(C): 36.6 (23 @ 08:01), Max: 36.8 (23 @ 22:44)  HR: 70 (23 @ 08:01) (70 - 80)  BP: 130/68 (23 @ 08:01) (109/62 - 130/68)  RR: 19 (23 @ 08:01) (18 - 19)  SpO2: 91% (23 @ 08:01) (91% - 96%)  Wt(kg): --    PHYSICAL EXAM:      GENERAL: comfortable     NEURO: awake/alert    NECK: no JVD    CHEST: scattered expiratory wheeze; distant    CARDIAC: RR    EXT: edematous      LABS:     Blood Gas Profile - Arterial (03.10.23 @ 03:06)   pH, Arterial: 7.31   pCO2, Arterial: 57 mmHg   pO2, Arterial: 52 mmHg   HCO3, Arterial: 29 mmol/L   Base Excess, Arterial: 1.2 mmol/L   Oxygen Saturation, Arterial: 79 %   Total CO2, Arterial: 30 mmol/L   Blood Gas Comments Arterial: NC 4   Blood Gas Source Arterial: Arterial     Pro-Brain Natriuretic Peptide (23 @ 21:48)   Pro-Brain Natriuretic Peptide: 8881 pg/mL             139  |  103  |  68<H>  ----------------------------<  237<H>  5.6<H>   |  34<H>  |  1.25    Ca    9.5      13 Mar 2023 06:42    RADIOLOGY:    TTE Echo Complete w/ Contrast w/ Doppler (03.10.23 @ 10:02) >   Impression     Summary     Endocardium is not well visualized, however, overall left ventricular   systolic function appears normal. Technically Difficult Study.   Septal flattening is seen; this finding is consistent with right heart   pressure / volume overload.   Estimated left ventricular ejection fraction is 55-60 %.   Normal appearing left atrium.   The right atrium appears moderately dilated.   The right ventricle is severely dilated.   The right ventricular apex appears hypokinetic.   The aortic valve is trileaflet with thin pliable leaflets.   Moderate mitral annular calcification is present.   There is calcification of mitral valve leaflets. The leaflet opening is   normal.   EA reversal of the mitral inflow consistent with reduced compliance of   the   left ventricle.   Trace mitral regurgitation is present.   The tricuspid valve leaflets are thin and pliable; valve motion is   normal.   Moderate (2+) tricuspid valve regurgitation is present.   Severe pulmonary hypertension.   Pulmonic valve not well seen.   No evidence of pericardial effusion.   No evidence of pleural effusion.   IVC is dilated and not collapsing with inspiration.               EXAM:  XR CHEST PORTABLE IMMED 1V   ORDERED BY: RENATA BUTLER     PROCEDURE DATE:  2023          INTERPRETATION:  INDICATION: Short of breath    Portable chest 9:30 PM    COMPARISON: 2016    Patient rotated. Overlying EKG leads and wires.    FINDINGS:  Heart/Vascular: The mediastinum, hilum and aorta are within normal limits   for projection. Cardiomegaly.  Pulmonary: Midline trachea. There is mild pulmonary venous congestion.   There is no focal consolidation or gross pleural effusion.    Bones: There is no fracture.  Lines and catheter: None    Impression:    Cardiomegaly with mild pulmonary venous congestion.    --- End of Report ---    GENET SWIFT DO; Attending Radiologist      MEDICATIONS  (STANDING):  amLODIPine   Tablet 2.5 milliGRAM(s) Oral daily  aspirin enteric coated 81 milliGRAM(s) Oral daily  atorvastatin 20 milliGRAM(s) Oral at bedtime  azithromycin   Tablet 500 milliGRAM(s) Oral daily  budesonide 160 MICROgram(s)/formoterol 4.5 MICROgram(s) Inhaler 2 Puff(s) Inhalation two times a day  cholecalciferol 1000 Unit(s) Oral daily  cyanocobalamin 1000 MICROGram(s) Oral daily  dextrose 5%. 1000 milliLiter(s) (50 mL/Hr) IV Continuous <Continuous>  dextrose 5%. 1000 milliLiter(s) (100 mL/Hr) IV Continuous <Continuous>  dextrose 50% Injectable 25 Gram(s) IV Push once  dextrose 50% Injectable 12.5 Gram(s) IV Push once  dextrose 50% Injectable 25 Gram(s) IV Push once  enoxaparin Injectable 40 milliGRAM(s) SubCutaneous every 24 hours  furosemide   Injectable 40 milliGRAM(s) IV Push two times a day  glucagon  Injectable 1 milliGRAM(s) IntraMuscular once  guaiFENesin  milliGRAM(s) Oral every 12 hours  insulin lispro (ADMELOG) corrective regimen sliding scale   SubCutaneous three times a day before meals  insulin lispro (ADMELOG) corrective regimen sliding scale   SubCutaneous at bedtime  losartan 100 milliGRAM(s) Oral daily  magnesium oxide 400 milliGRAM(s) Oral daily  methylPREDNISolone sodium succinate Injectable 30 milliGRAM(s) IV Push two times a day  metoprolol succinate ER 50 milliGRAM(s) Oral daily  spironolactone 25 milliGRAM(s) Oral daily  tiotropium 2.5 MICROgram(s) Inhaler 2 Puff(s) Inhalation daily    MEDICATIONS  (PRN):  acetaminophen     Tablet .. 650 milliGRAM(s) Oral every 6 hours PRN Temp greater or equal to 38C (100.4F), Mild Pain (1 - 3)  albuterol    90 MICROgram(s) HFA Inhaler 2 Puff(s) Inhalation every 6 hours PRN Shortness of Breath and/or Wheezing  aluminum hydroxide/magnesium hydroxide/simethicone Suspension 30 milliLiter(s) Oral every 4 hours PRN Dyspepsia  dextrose Oral Gel 15 Gram(s) Oral once PRN Blood Glucose LESS THAN 70 milliGRAM(s)/deciliter  melatonin 3 milliGRAM(s) Oral at bedtime PRN Insomnia  ondansetron Injectable 4 milliGRAM(s) IV Push every 8 hours PRN Nausea and/or Vomiting        A/P: 1. Acute on chronic diastolic congestive heart failure. Responding to diuresis. Per cardiology.     2. Very severe COPD / acute on chronic hypercarbic respiratory failure with hypoxia. Continue inhaled bronchodilators. Transition to oral steroids. Continue O2 supplement.     3. Pulmonary HTN. Group 2 and group 3 etiology. Optimaze treatment for chronic cardiac and pulmonary disease. Continue supplemental O2.     4. ? CARRINGTON / obesity hypoventilation. Consider outpatient eval for CARRINGTON if patient agrees.     5. Discharge planning. Per hospitalist team. Patient lives alone. May benefit from VNS services / physical therapy. Increase activity; consider PT eval prior to discharge.        
JOHANA MCDERMOTT  MRN: [id number]    S: Chief Complaint: "I was blowing up" - 25 pound weight gain over 3 weeks PTA with increasing leg edema, increasing abdominal girth, and shortness of breath.     HPI: 68 yo female c/o 3 week hx of  increasing leg edema, increasing abdominal girth, and shortness of breath. She denies hx of fever, chills, chest pain, purulent sputum production, or hemoptysis. The patient started taking diuretics (Lasix and aldactone) on her own but her edema did not improve.    The patient has severe COPD with chronic hypoxic respiratory failure on home O2.        3/11/2023: Sitting up in chair. Breathing comfortably. Feels much better. No dyspnea at rest. Edema improving with IV diuresis.      3/12/2023: Continues to report improvement. Denies shortness of breath at rest. Decreased edema.     3/13/2023: No complaints. Feels better. Denies shortness of breath.     3/14/2023: Sitting up in chair; breathing comfortably. Did not sleep well. Mild dyspnea when walking in the [place] with PT yesterday. Feela as though she is close to her baseline; wants to go home.     PAST MEDICAL & SURGICAL HISTORY:  COPD, severe      H/O pulmonary hypertension      Hypertension      History of left bundle branch block (LBBB)      S/P           O: T(C): 36.8 (23 @ 22:27), Max: 36.8 (23 @ 22:27)  HR: 76 (23 @ 22:27) (70 - 85)  BP: 108/76 (23 @ 22:27) (108/76 - 118/64)  RR: 18 (23 @ 22:27) (18 - 18)  SpO2: 93% (23 @ 22:27) (91% - 95%)  Wt(kg): --    PHYSICAL EXAM:      GENERAL: comfortable     NEURO: awake/alert    NECK: no JVD    CHEST: scattered expiratory wheeze; distant    CARDIAC: RR    EXT: edematous      LABS:     Blood Gas Profile - Arterial (03.10.23 @ 03:06)   pH, Arterial: 7.31   pCO2, Arterial: 57 mmHg   pO2, Arterial: 52 mmHg   HCO3, Arterial: 29 mmol/L   Base Excess, Arterial: 1.2 mmol/L   Oxygen Saturation, Arterial: 79 %   Total CO2, Arterial: 30 mmol/L   Blood Gas Comments Arterial: NC 4   Blood Gas Source Arterial: Arterial     Pro-Brain Natriuretic Peptide (23 @ 21:48)   Pro-Brain Natriuretic Peptide: 8881 pg/mL               139  |  103  |  68<H>  ----------------------------<  237<H>  5.6<H>   |  34<H>  |  1.25    Ca    9.5      13 Mar 2023 06:42    RADIOLOGY:    TTE Echo Complete w/ Contrast w/ Doppler (03.10.23 @ 10:02) >   Impression     Summary     Endocardium is not well visualized, however, overall left ventricular   systolic function appears normal. Technically Difficult Study.   Septal flattening is seen; this finding is consistent with right heart   pressure / volume overload.   Estimated left ventricular ejection fraction is 55-60 %.   Normal appearing left atrium.   The right atrium appears moderately dilated.   The right ventricle is severely dilated.   The right ventricular apex appears hypokinetic.   The aortic valve is trileaflet with thin pliable leaflets.   Moderate mitral annular calcification is present.   There is calcification of mitral valve leaflets. The leaflet opening is   normal.   EA reversal of the mitral inflow consistent with reduced compliance of   the   left ventricle.   Trace mitral regurgitation is present.   The tricuspid valve leaflets are thin and pliable; valve motion is   normal.   Moderate (2+) tricuspid valve regurgitation is present.   Severe pulmonary hypertension.   Pulmonic valve not well seen.   No evidence of pericardial effusion.   No evidence of pleural effusion.   IVC is dilated and not collapsing with inspiration.               EXAM:  XR CHEST PORTABLE IMMED 1V   ORDERED BY: RENATA BUTLER     PROCEDURE DATE:  2023          INTERPRETATION:  INDICATION: Short of breath    Portable chest 9:30 PM    COMPARISON: 2016    Patient rotated. Overlying EKG leads and wires.    FINDINGS:  Heart/Vascular: The mediastinum, hilum and aorta are within normal limits   for projection. Cardiomegaly.  Pulmonary: Midline trachea. There is mild pulmonary venous congestion.   There is no focal consolidation or gross pleural effusion.    Bones: There is no fracture.  Lines and catheter: None    Impression:    Cardiomegaly with mild pulmonary venous congestion.    --- End of Report ---    GENET SWIFT DO; Attending Radiologist      MEDICATIONS  (STANDING):  amLODIPine   Tablet 2.5 milliGRAM(s) Oral daily  aspirin enteric coated 81 milliGRAM(s) Oral daily  atorvastatin 20 milliGRAM(s) Oral at bedtime  budesonide 160 MICROgram(s)/formoterol 4.5 MICROgram(s) Inhaler 2 Puff(s) Inhalation two times a day  cholecalciferol 1000 Unit(s) Oral daily  cyanocobalamin 1000 MICROGram(s) Oral daily  dextrose 5%. 1000 milliLiter(s) (100 mL/Hr) IV Continuous <Continuous>  dextrose 5%. 1000 milliLiter(s) (50 mL/Hr) IV Continuous <Continuous>  dextrose 50% Injectable 25 Gram(s) IV Push once  dextrose 50% Injectable 12.5 Gram(s) IV Push once  dextrose 50% Injectable 25 Gram(s) IV Push once  enoxaparin Injectable 40 milliGRAM(s) SubCutaneous every 24 hours  furosemide    Tablet 40 milliGRAM(s) Oral two times a day  glucagon  Injectable 1 milliGRAM(s) IntraMuscular once  guaiFENesin  milliGRAM(s) Oral every 12 hours  insulin lispro (ADMELOG) corrective regimen sliding scale   SubCutaneous three times a day before meals  insulin lispro (ADMELOG) corrective regimen sliding scale   SubCutaneous at bedtime  losartan 100 milliGRAM(s) Oral daily  magnesium oxide 400 milliGRAM(s) Oral daily  metoprolol succinate ER 50 milliGRAM(s) Oral daily  predniSONE   Tablet 30 milliGRAM(s) Oral daily  tiotropium 2.5 MICROgram(s) Inhaler 2 Puff(s) Inhalation daily    MEDICATIONS  (PRN):  acetaminophen     Tablet .. 650 milliGRAM(s) Oral every 6 hours PRN Temp greater or equal to 38C (100.4F), Mild Pain (1 - 3)  albuterol    90 MICROgram(s) HFA Inhaler 2 Puff(s) Inhalation every 6 hours PRN Shortness of Breath and/or Wheezing  aluminum hydroxide/magnesium hydroxide/simethicone Suspension 30 milliLiter(s) Oral every 4 hours PRN Dyspepsia  dextrose Oral Gel 15 Gram(s) Oral once PRN Blood Glucose LESS THAN 70 milliGRAM(s)/deciliter  melatonin 3 milliGRAM(s) Oral at bedtime PRN Insomnia  ondansetron Injectable 4 milliGRAM(s) IV Push every 8 hours PRN Nausea and/or Vomiting        A/P:  1. Acute on chronic diastolic congestive heart failure. Now on oral Lasix. Increased BUN with diuresis; adjust diuretics and follow up BMP per primary care team and cardiology.     2. Very severe COPD / acute on chronic hypercarbic respiratory failure with hypoxia. Continue inhaled Symbicort and Spiriva. Now on prednisone 30mg daily. Would decrease and discontinue prednisone over the next 5 - 10 days.  Continue O2 supplement.     3. Pulmonary HTN. Group 2 and group 3 etiology. Optimize treatment for chronic cardiac and pulmonary disease. Continue supplemental O2.     4. ? CARRINGTON / obesity hypoventilation. Consider outpatient eval for CARRINGTON if patient agrees.     5. Discharge planning. Per hospitalist team. Patient lives alone. May benefit from VNS services / physical therapy. PT uzair appreciated. She should follow up with me in the office within 2 weeks post discharge.       
  CHIEF COMPLAINT: Patient is a 67y old  Female who presents with a chief complaint of SOB (10 Mar 2023 04:09)      HPI:  68 yo female with PMH of COPD (on nocturnal home O2), DM2, pulm HTN, LBBB, HTN, HLD presents to the ED with dyspnea. Pt states for the past 3 weeks she has been feeling unwell. Complains of dyspnea on exertion. Has a chronic cough productive of clear sputum and sometimes brownish which is unchanged secondary to her COPD. No fevers. No chest pain. She has noticed a 25 lb weight gain. She admits to dietary indiscretions with drinking increased fluids at home. She has been trying to adjust her diuretics on her own at home (lasix and aldactone) but symptoms did not improve. Denies any headaches, visual changes, CP, abd pain, N/V/D, dysuria.   In the ED patient initially hypoxic to 60s. Placed on BIPAP. She was given lasix and solumedrol with improvement of symptoms. She is currently off BIPAP and feeling better. Seen by ICU as well.  (10 Mar 2023 04:09)  Cardiology consulted to evaluate for CHF. Pt.'s cardiologist is Dr. Rhys Erazo, pt. with known HFpEF and COPD, Echo with preserved LVEF 55-60%, needs diuresis      3/11/23: pt. denies chest pain, less PAINTER, Tele: NSR 70-80, PVCs  Marital Status:     MEDICATIONS  (STANDING):  amLODIPine   Tablet 2.5 milliGRAM(s) Oral daily  aspirin enteric coated 81 milliGRAM(s) Oral daily  atorvastatin 20 milliGRAM(s) Oral at bedtime  azithromycin   Tablet 500 milliGRAM(s) Oral daily  budesonide 160 MICROgram(s)/formoterol 4.5 MICROgram(s) Inhaler 2 Puff(s) Inhalation two times a day  cholecalciferol 1000 Unit(s) Oral daily  cyanocobalamin 1000 MICROGram(s) Oral daily  dextrose 5%. 1000 milliLiter(s) (100 mL/Hr) IV Continuous <Continuous>  dextrose 5%. 1000 milliLiter(s) (50 mL/Hr) IV Continuous <Continuous>  dextrose 50% Injectable 25 Gram(s) IV Push once  dextrose 50% Injectable 12.5 Gram(s) IV Push once  dextrose 50% Injectable 25 Gram(s) IV Push once  enoxaparin Injectable 40 milliGRAM(s) SubCutaneous every 24 hours  furosemide   Injectable 40 milliGRAM(s) IV Push two times a day  glucagon  Injectable 1 milliGRAM(s) IntraMuscular once  guaiFENesin  milliGRAM(s) Oral every 12 hours  insulin lispro (ADMELOG) corrective regimen sliding scale   SubCutaneous three times a day before meals  insulin lispro (ADMELOG) corrective regimen sliding scale   SubCutaneous at bedtime  losartan 100 milliGRAM(s) Oral daily  magnesium oxide 400 milliGRAM(s) Oral daily  methylPREDNISolone sodium succinate Injectable 40 milliGRAM(s) IV Push every 12 hours  metoprolol succinate ER 50 milliGRAM(s) Oral daily  spironolactone 25 milliGRAM(s) Oral daily  tiotropium 2.5 MICROgram(s) Inhaler 2 Puff(s) Inhalation daily      MEDICATIONS  (PRN):  acetaminophen     Tablet .. 650 milliGRAM(s) Oral every 6 hours PRN Temp greater or equal to 38C (100.4F), Mild Pain (1 - 3)  albuterol    90 MICROgram(s) HFA Inhaler 2 Puff(s) Inhalation every 6 hours PRN Shortness of Breath and/or Wheezing  aluminum hydroxide/magnesium hydroxide/simethicone Suspension 30 milliLiter(s) Oral every 4 hours PRN Dyspepsia  dextrose Oral Gel 15 Gram(s) Oral once PRN Blood Glucose LESS THAN 70 milliGRAM(s)/deciliter  melatonin 3 milliGRAM(s) Oral at bedtime PRN Insomnia  ondansetron Injectable 4 milliGRAM(s) IV Push every 8 hours PRN Nausea and/or Vomiting      Vital Signs Last 24 Hrs  T(C): 36.6 (11 Mar 2023 07:20), Max: 37.7 (10 Mar 2023 20:23)  T(F): 97.8 (11 Mar 2023 07:20), Max: 99.8 (10 Mar 2023 20:23)  HR: 69 (11 Mar 2023 07:20) (69 - 82)  BP: 104/44 (11 Mar 2023 07:20) (92/40 - 104/50)  BP(mean): --  RR: 18 (11 Mar 2023 07:20) (18 - 20)  SpO2: 92% (11 Mar 2023 07:20) (90% - 95%)    Parameters below as of 10 Mar 2023 21:00  Patient On (Oxygen Delivery Method): nasal cannula  O2 Flow (L/min): 4      PHYSICAL EXAM:  Constitutional: NAD, awake and alert  HEENT:  EOMI,  Pupils round, No oral cyanosis.  Pulmonary: Scattered wheezes   Cardiovascular: S1 and S2, regular rate and rhythm, no Murmurs, gallops or rubs  Gastrointestinal: Bowel Sounds present, soft, nontender.   Lymph: No peripheral edema. No cervical lymphadenopathy.  Neurological: Alert, no focal deficits  Extremities: +2 pedal edema   Skin: No rashes.  Psych:  Mood & affect appropriate    LABS:                            12.4   3.88  )-----------( 270      ( 10 Mar 2023 07:29 )             43.2     03-11    135  |  101  |  48<H>  ----------------------------<  203<H>  5.1   |  33<H>  |  1.48<H>    Ca    8.8      11 Mar 2023 05:28    TPro  7.6  /  Alb  3.4  /  TBili  0.5  /  DBili  x   /  AST  34  /  ALT  27  /  AlkPhos  99  03-09    - TroponinI hsT: <-27.74             12.4   3.88  )-----------( 270      ( 10 Mar 2023 07:29 )             43.2                         12.5   7.15  )-----------( 280      ( 09 Mar 2023 21:48 )             42.6     10 Mar 2023 07:29    140    |  102    |  36     ----------------------------<  185    4.8     |  33     |  1.07   09 Mar 2023 21:48    138    |  100    |  30     ----------------------------<  128    4.7     |  33     |  1.11     Ca    9.3        10 Mar 2023 07:29  Ca    9.3        09 Mar 2023 21:48    TPro  7.6    /  Alb  3.4    /  TBili  0.5    /  DBili  x      /  AST  34     /  ALT  27     /  AlkPhos  99     09 Mar 2023 21:48      Radiology: reviewed     < from: TTE Echo Complete w/ Contrast w/ Doppler (03.10.23 @ 10:02) >   Impression     Summary     Endocardium is not well visualized, however, overall left ventricular   systolic function appears normal. Technically Difficult Study.   Septal flattening is seen; this finding is consistent with right heart   pressure / volume overload.   Estimated left ventricular ejection fraction is 55-60 %.   Normal appearing left atrium.   The right atrium appears moderately dilated.   The right ventricle is severely dilated.   The right ventricular apex appears hypokinetic.   The aortic valve is trileaflet with thin pliable leaflets.   Moderate mitral annular calcification is present.   There is calcification of mitral valve leaflets. The leaflet opening is   normal.   EA reversal of the mitral inflow consistent with reduced compliance of   the   left ventricle.   Trace mitral regurgitation is present.   The tricuspid valve leaflets are thin and pliable; valve motion is   normal.   Moderate (2+) tricuspid valve regurgitation is present.   Severe pulmonary hypertension.   Pulmonic valve not well seen.   No evidence of pericardial effusion.   No evidence of pleural effusion.   IVC is dilated and not collapsing with inspiration.    < end of copied text >

## 2023-03-14 NOTE — PROGRESS NOTE ADULT - PROVIDER SPECIALTY LIST ADULT
Hospitalist
Hospitalist
Pulmonology
Pulmonology
Hospitalist
Pulmonology
Cardiology

## 2023-03-17 DIAGNOSIS — I44.7 LEFT BUNDLE-BRANCH BLOCK, UNSPECIFIED: ICD-10-CM

## 2023-03-17 DIAGNOSIS — Z79.84 LONG TERM (CURRENT) USE OF ORAL HYPOGLYCEMIC DRUGS: ICD-10-CM

## 2023-03-17 DIAGNOSIS — I27.20 PULMONARY HYPERTENSION, UNSPECIFIED: ICD-10-CM

## 2023-03-17 DIAGNOSIS — J96.21 ACUTE AND CHRONIC RESPIRATORY FAILURE WITH HYPOXIA: ICD-10-CM

## 2023-03-17 DIAGNOSIS — I11.0 HYPERTENSIVE HEART DISEASE WITH HEART FAILURE: ICD-10-CM

## 2023-03-17 DIAGNOSIS — I50.33 ACUTE ON CHRONIC DIASTOLIC (CONGESTIVE) HEART FAILURE: ICD-10-CM

## 2023-03-17 DIAGNOSIS — Z79.82 LONG TERM (CURRENT) USE OF ASPIRIN: ICD-10-CM

## 2023-03-17 DIAGNOSIS — Z79.51 LONG TERM (CURRENT) USE OF INHALED STEROIDS: ICD-10-CM

## 2023-03-17 DIAGNOSIS — J44.1 CHRONIC OBSTRUCTIVE PULMONARY DISEASE WITH (ACUTE) EXACERBATION: ICD-10-CM

## 2023-03-17 DIAGNOSIS — Z66 DO NOT RESUSCITATE: ICD-10-CM

## 2023-03-17 DIAGNOSIS — E66.2 MORBID (SEVERE) OBESITY WITH ALVEOLAR HYPOVENTILATION: ICD-10-CM

## 2023-03-17 DIAGNOSIS — Z20.822 CONTACT WITH AND (SUSPECTED) EXPOSURE TO COVID-19: ICD-10-CM

## 2023-03-17 DIAGNOSIS — E11.9 TYPE 2 DIABETES MELLITUS WITHOUT COMPLICATIONS: ICD-10-CM

## 2023-03-17 DIAGNOSIS — Z87.891 PERSONAL HISTORY OF NICOTINE DEPENDENCE: ICD-10-CM

## 2023-03-17 DIAGNOSIS — Z79.85 LONG-TERM (CURRENT) USE OF INJECTABLE NON-INSULIN ANTIDIABETIC DRUGS: ICD-10-CM

## 2023-03-17 DIAGNOSIS — Z91.040 LATEX ALLERGY STATUS: ICD-10-CM

## 2023-03-17 DIAGNOSIS — J96.22 ACUTE AND CHRONIC RESPIRATORY FAILURE WITH HYPERCAPNIA: ICD-10-CM

## 2023-04-09 ENCOUNTER — INPATIENT (INPATIENT)
Facility: HOSPITAL | Age: 68
LOS: 8 days | Discharge: NURSING, REHAB OR LONG-TERM CARE | DRG: 286 | End: 2023-04-18
Attending: STUDENT IN AN ORGANIZED HEALTH CARE EDUCATION/TRAINING PROGRAM | Admitting: FAMILY MEDICINE
Payer: MEDICARE

## 2023-04-09 VITALS
SYSTOLIC BLOOD PRESSURE: 130 MMHG | OXYGEN SATURATION: 100 % | DIASTOLIC BLOOD PRESSURE: 103 MMHG | HEART RATE: 85 BPM | RESPIRATION RATE: 24 BRPM

## 2023-04-09 DIAGNOSIS — Z98.891 HISTORY OF UTERINE SCAR FROM PREVIOUS SURGERY: Chronic | ICD-10-CM

## 2023-04-09 LAB
ALBUMIN SERPL ELPH-MCNC: 3.7 G/DL — SIGNIFICANT CHANGE UP (ref 3.3–5)
ALP SERPL-CCNC: 115 U/L — SIGNIFICANT CHANGE UP (ref 40–120)
ALT FLD-CCNC: 40 U/L — SIGNIFICANT CHANGE UP (ref 12–78)
ANION GAP SERPL CALC-SCNC: 3 MMOL/L — LOW (ref 5–17)
ANISOCYTOSIS BLD QL: SLIGHT — SIGNIFICANT CHANGE UP
AST SERPL-CCNC: 67 U/L — HIGH (ref 15–37)
BASE EXCESS BLDA CALC-SCNC: 6.1 MMOL/L — HIGH (ref -2–3)
BASE EXCESS BLDV CALC-SCNC: 6.2 MMOL/L — SIGNIFICANT CHANGE UP
BASOPHILS # BLD AUTO: 0 K/UL — SIGNIFICANT CHANGE UP (ref 0–0.2)
BASOPHILS NFR BLD AUTO: 0 % — SIGNIFICANT CHANGE UP (ref 0–2)
BILIRUB SERPL-MCNC: 0.9 MG/DL — SIGNIFICANT CHANGE UP (ref 0.2–1.2)
BLOOD GAS COMMENTS ARTERIAL: SIGNIFICANT CHANGE UP
BUN SERPL-MCNC: 59 MG/DL — HIGH (ref 7–23)
CALCIUM SERPL-MCNC: 9.4 MG/DL — SIGNIFICANT CHANGE UP (ref 8.5–10.1)
CHLORIDE SERPL-SCNC: 102 MMOL/L — SIGNIFICANT CHANGE UP (ref 96–108)
CO2 BLDA-SCNC: 36 MMOL/L — HIGH (ref 19–24)
CO2 BLDV-SCNC: 39 MMOL/L — HIGH (ref 22–26)
CO2 SERPL-SCNC: 34 MMOL/L — HIGH (ref 22–31)
CREAT SERPL-MCNC: 1.18 MG/DL — SIGNIFICANT CHANGE UP (ref 0.5–1.3)
EGFR: 50 ML/MIN/1.73M2 — LOW
EOSINOPHIL # BLD AUTO: 0.05 K/UL — SIGNIFICANT CHANGE UP (ref 0–0.5)
EOSINOPHIL NFR BLD AUTO: 1 % — SIGNIFICANT CHANGE UP (ref 0–6)
FLUAV AG NPH QL: SIGNIFICANT CHANGE UP
FLUBV AG NPH QL: SIGNIFICANT CHANGE UP
GLUCOSE SERPL-MCNC: 148 MG/DL — HIGH (ref 70–99)
HCO3 BLDA-SCNC: 34 MMOL/L — HIGH (ref 21–28)
HCO3 BLDV-SCNC: 36 MMOL/L — HIGH (ref 22–29)
HCT VFR BLD CALC: 46.2 % — HIGH (ref 34.5–45)
HGB BLD-MCNC: 13 G/DL — SIGNIFICANT CHANGE UP (ref 11.5–15.5)
LYMPHOCYTES # BLD AUTO: 0.33 K/UL — LOW (ref 1–3.3)
LYMPHOCYTES # BLD AUTO: 7 % — LOW (ref 13–44)
MAGNESIUM SERPL-MCNC: 2.5 MG/DL — SIGNIFICANT CHANGE UP (ref 1.6–2.6)
MANUAL SMEAR VERIFICATION: SIGNIFICANT CHANGE UP
MCHC RBC-ENTMCNC: 24.9 PG — LOW (ref 27–34)
MCHC RBC-ENTMCNC: 28.1 GM/DL — LOW (ref 32–36)
MCV RBC AUTO: 88.3 FL — SIGNIFICANT CHANGE UP (ref 80–100)
MONOCYTES # BLD AUTO: 0.38 K/UL — SIGNIFICANT CHANGE UP (ref 0–0.9)
MONOCYTES NFR BLD AUTO: 8 % — SIGNIFICANT CHANGE UP (ref 2–14)
NEUTROPHILS # BLD AUTO: 4 K/UL — SIGNIFICANT CHANGE UP (ref 1.8–7.4)
NEUTROPHILS NFR BLD AUTO: 82 % — HIGH (ref 43–77)
NEUTS BAND # BLD: 2 % — SIGNIFICANT CHANGE UP (ref 0–8)
NRBC # BLD: 0 /100 — SIGNIFICANT CHANGE UP (ref 0–0)
NRBC # BLD: SIGNIFICANT CHANGE UP /100 WBCS (ref 0–0)
NT-PROBNP SERPL-SCNC: 8970 PG/ML — HIGH (ref 0–125)
OVALOCYTES BLD QL SMEAR: SLIGHT — SIGNIFICANT CHANGE UP
PCO2 BLDA: 61 MMHG — HIGH (ref 32–45)
PCO2 BLDV: 81 MMHG — HIGH (ref 39–42)
PH BLDA: 7.35 — SIGNIFICANT CHANGE UP (ref 7.35–7.45)
PH BLDV: 7.26 — LOW (ref 7.32–7.43)
PLAT MORPH BLD: NORMAL — SIGNIFICANT CHANGE UP
PLATELET # BLD AUTO: 332 K/UL — SIGNIFICANT CHANGE UP (ref 150–400)
PO2 BLDA: 138 MMHG — HIGH (ref 83–108)
PO2 BLDV: 57 MMHG — SIGNIFICANT CHANGE UP
POLYCHROMASIA BLD QL SMEAR: SLIGHT — SIGNIFICANT CHANGE UP
POTASSIUM SERPL-MCNC: 5.5 MMOL/L — HIGH (ref 3.5–5.3)
POTASSIUM SERPL-SCNC: 5.5 MMOL/L — HIGH (ref 3.5–5.3)
PROT SERPL-MCNC: 7.6 GM/DL — SIGNIFICANT CHANGE UP (ref 6–8.3)
RBC # BLD: 5.23 M/UL — HIGH (ref 3.8–5.2)
RBC # FLD: 18.1 % — HIGH (ref 10.3–14.5)
RBC BLD AUTO: ABNORMAL
RSV RNA NPH QL NAA+NON-PROBE: SIGNIFICANT CHANGE UP
SAO2 % BLDA: 99 % — HIGH (ref 94–98)
SAO2 % BLDV: 84 % — SIGNIFICANT CHANGE UP
SARS-COV-2 RNA SPEC QL NAA+PROBE: SIGNIFICANT CHANGE UP
SODIUM SERPL-SCNC: 139 MMOL/L — SIGNIFICANT CHANGE UP (ref 135–145)
TROPONIN I, HIGH SENSITIVITY RESULT: 34.35 NG/L — SIGNIFICANT CHANGE UP
TROPONIN I, HIGH SENSITIVITY RESULT: 36.31 NG/L — SIGNIFICANT CHANGE UP
WBC # BLD: 4.76 K/UL — SIGNIFICANT CHANGE UP (ref 3.8–10.5)
WBC # FLD AUTO: 4.76 K/UL — SIGNIFICANT CHANGE UP (ref 3.8–10.5)

## 2023-04-09 PROCEDURE — 99291 CRITICAL CARE FIRST HOUR: CPT

## 2023-04-09 RX ORDER — MAGNESIUM SULFATE 500 MG/ML
2 VIAL (ML) INJECTION ONCE
Refills: 0 | Status: COMPLETED | OUTPATIENT
Start: 2023-04-09 | End: 2023-04-09

## 2023-04-09 RX ORDER — IPRATROPIUM BROMIDE 0.2 MG/ML
500 SOLUTION, NON-ORAL INHALATION
Refills: 0 | Status: COMPLETED | OUTPATIENT
Start: 2023-04-09 | End: 2023-04-09

## 2023-04-09 RX ORDER — SODIUM CHLORIDE 9 MG/ML
250 INJECTION INTRAMUSCULAR; INTRAVENOUS; SUBCUTANEOUS ONCE
Refills: 0 | Status: COMPLETED | OUTPATIENT
Start: 2023-04-09 | End: 2023-04-09

## 2023-04-09 RX ORDER — FUROSEMIDE 40 MG
40 TABLET ORAL ONCE
Refills: 0 | Status: COMPLETED | OUTPATIENT
Start: 2023-04-09 | End: 2023-04-09

## 2023-04-09 RX ADMIN — Medication 40 MILLIGRAM(S): at 22:37

## 2023-04-09 RX ADMIN — Medication 500 MICROGRAM(S): at 18:23

## 2023-04-09 RX ADMIN — Medication 125 MILLIGRAM(S): at 17:43

## 2023-04-09 RX ADMIN — Medication 150 GRAM(S): at 17:43

## 2023-04-09 RX ADMIN — Medication 500 MICROGRAM(S): at 18:03

## 2023-04-09 RX ADMIN — Medication 500 MICROGRAM(S): at 17:43

## 2023-04-09 NOTE — ED ADULT TRIAGE NOTE - HISTORY OF COVID-19 VACCINATION
Bedside and Verbal shift change report given to Yoselin Arce (oncoming nurse) by Efrem Malik (offgoing nurse). Report included the following information SBAR, Kardex, Recent Results, Cardiac Rhythm NSR, and Alarm Parameters . Vaccine status unknown

## 2023-04-09 NOTE — ED PROVIDER NOTE - RESPIRATORY, MLM
b/l vascular crackles. wheezing at the bases of both lungs. congestion at the bases of both lungs. +retraction +hypoxia

## 2023-04-09 NOTE — ED PROVIDER NOTE - CROS ED RESP ALL NEG
"Call to pt.  Advise per [facility] Tellez note.  Verb understanding.      Concern re: moving up scopes.  Advise that message has been sent to Dr Nolen's  re: this matter.  Pt asking to check with Dr Nolen.     Asking if may have letter of clearance from this office to proceed with knee surgery.  Question to pt if this has been requested by orthopod.  States has not, \"but what if they do\".     Message to [facility] Tellez.     Message to DR Nolen.   " - - -

## 2023-04-09 NOTE — ED PROVIDER NOTE - CRITICAL CARE ATTENDING CONTRIBUTION TO CARE
critically ill patient, with respiratory distress/failure, Bipap, labs, imaging  interpretation of labs and imaging  cardiac monitoring, repeat assessments, in ER observation  ICU consultation

## 2023-04-09 NOTE — ED PROVIDER NOTE - PROGRESS NOTE DETAILS
Radha WATTERS: Patient with COPD excerbation s/p Bipap, ICU consulted, ICU saw patient, states patient is safe for admission to Tele, no CICU or ICU as per ICU. Defer placement decision to ICU. CT angio shows no PE however evidence of effusion and possible congestion with abdominal ascites. ICU ordered patient for lasix. BP systolic 100/70 MAP more than 60. Will give brisk hydration, follow up with BP. Signed out the care of the patient to Dr. Villarreal. Hospitalist admission appreciated.

## 2023-04-09 NOTE — ED PROVIDER NOTE - CLINICAL SUMMARY MEDICAL DECISION MAKING FREE TEXT BOX
pt acute respiratory distress/acute respiratory failure BIB EMT, hypoxia O2 50% w/ PMHx of COPD and CHF presents to ED. pt w/ steroid magnesium and nebulized treatment. BiPAP, ICU consult and admission. pt acute respiratory distress/acute respiratory failure BIB EMT, hypoxia O2 50% w/ PMHx of COPD and CHF presents to ED. pt w/ steroid magnesium and nebulized treatment. BiPAP, ICU consult and admission.    lower extremity swelling, SOB, PAINTER, hypoxia r/o CHF, COPD. Patient with COPD excerbation s/p Bipap, ICU consulted, ICU saw patient, states patient is safe for admission to Tele, no CICU or ICU as per ICU. Defer placement decision to ICU. CT angio shows no PE however evidence of effusion and possible congestion with abdominal ascites. ICU ordered patient for lasix. BP systolic 100/70 MAP more than 60. Will give brisk hydration, follow up with BP. Signed out the care of the patient to Dr. Villarreal. Hospitalist admission appreciated.

## 2023-04-09 NOTE — ED ADULT NURSE REASSESSMENT NOTE - NS ED NURSE REASSESS COMMENT FT1
Pt care assumed from previous RN, Ivania. Pt is AOx4 w/ son at bedside. BiPAP is in place and pt verbalizes improved breathing. Pt is aware of POC for ICU consult. Safety and comfort measures in place at this time.

## 2023-04-09 NOTE — CONSULT NOTE ADULT - ASSESSMENT
Impression:  1. acute COPD excerbation  2. acute on chronic hypercapnic/hypoxemic respiratory failure  3. pleural effusion    Plan: Impression:  1. acute COPD excerbation  2. acute on chronic hypercapnic/hypoxemic respiratory failure  3. pleural effusion  4. compressive atelectasis    Plan:  pt does not meet ICU criteria at this time, please reconsult if status changes.   Admit to /tele bed  ABG repeat shows normalization of pH, chronic hypercapnia  trialed off NIPPV support to 3L NC, sats 91-92%  would cont O2 with uptitration only to keep sats>90%  recommend nocturnal CPAP  IV lasix 40mg x 1 given  cont steroid/duonebs  no fever, leukocytosis, CT without infiltrates no PE, recent treatment for CAP, would observe off abx  RSV/flu/COVID negative, would send full respiratory pathogen panel  cont daily lasix  consult pulmonary

## 2023-04-09 NOTE — ED PROVIDER NOTE - NS_ ATTENDINGSCRIBEDETAILS _ED_A_ED_FT
I Dre Garcia MD saw and examined the patient. Scribe documented for me and under my supervision. I have modified the scribe's documentation where necessary to reflect my history, physical exam and other relevant documentations pertinent to the care of the patient.

## 2023-04-09 NOTE — ED ADULT TRIAGE NOTE - CHIEF COMPLAINT QUOTE
pt BIBEMS in respiratory distress . pt lives alone at home. o2 53% upon arrival. PMH CHF and COPD. pt has not taken any medications today. CPAP placed on pt PTA. pt brought directly into trauma

## 2023-04-09 NOTE — CONSULT NOTE ADULT - SUBJECTIVE AND OBJECTIVE BOX
Patient is a 68y old  Female who presents with a chief complaint of     BRIEF HOSPITAL COURSE: ***    Events last 24 hours: ***    PAST MEDICAL & SURGICAL HISTORY:  COPD, severe      H/O pulmonary hypertension      Hypertension      History of left bundle branch block (LBBB)      S/P           Review of Systems:  12 pt ROS negative unless otherwise stated above      Medications:          ICU Vital Signs Last 24 Hrs  T(C): 36.7 (2023 22:11), Max: 37.1 (2023 18:30)  T(F): 98 (2023 22:11), Max: 98.7 (2023 18:30)  HR: 85 (2023 22:11) (82 - 86)  BP: 122/74 (2023 22:11) (106/81 - 150/76)  BP(mean): 93 (2023 18:30) (87 - 109)  ABP: --  ABP(mean): --  RR: 22 (2023 22:11) (19 - 24)  SpO2: 93% (2023 22:11) (93% - 100%)    O2 Parameters below as of 2023 22:11  Patient On (Oxygen Delivery Method): nasal cannula  O2 Flow (L/min): 3          ABG - ( 2023 21:06 )  pH, Arterial: 7.35  pH, Blood: x     /  pCO2: 61    /  pO2: 138   / HCO3: 34    / Base Excess: 6.1   /  SaO2: 99                        LABS:                        13.0   4.76  )-----------( 332      ( 2023 17:42 )             46.2     04    139  |  102  |  59<H>  ----------------------------<  148<H>  5.5<H>   |  34<H>  |  1.18    Ca    9.4      2023 17:42  Mg     2.5     04-09    TPro  7.6  /  Alb  3.7  /  TBili  0.9  /  DBili  x   /  AST  67<H>  /  ALT  40  /  AlkPhos  115  04-09          CAPILLARY BLOOD GLUCOSE                Physical Examination:    General: No acute distress.  Alert, oriented, interactive, nonfocal    HEENT: Pupils equal, reactive to light.  Symmetric.    PULM: Clear to auscultation bilaterally, no significant sputum production    CVS: Regular rate and rhythm, no murmurs, rubs, or gallops    ABD: Soft, nondistended, nontender, normoactive bowel sounds, no masses    EXT: No edema, nontender    SKIN: Warm and well perfused, no rashes noted.    RADIOLOGY: ***     Patient is a 68y old  Female who presents with a chief complaint of     BRIEF HOSPITAL COURSE:   68F with PMHx COPD (on home O2 3L), COPD excerbation (3/2023) pHTN, LBBB, HTN, HLD, obesity, DM who presented BIBEMS for hypoxia. Pt reportedly with worsening SOB for last few weeks with LE swelling. Today not feeling well and unable to get OOB. She states she did not take any of her meds today. Today she noted that her pOx was low at home and called EMS. Upon EMS arrival pt sats reportedly in 70s. Upon arrival to ED sat was low placed on CPAP. VBG showed acute on chronic hypercarbia. Treated with steroids and bronchodialators. ICU consulted.        PAST MEDICAL & SURGICAL HISTORY:  COPD, severe      H/O pulmonary hypertension      Hypertension      History of left bundle branch block (LBBB)      S/P         Review of Systems:  12 pt ROS negative unless otherwise stated above      Medications:          ICU Vital Signs Last 24 Hrs  T(C): 36.7 (2023 22:11), Max: 37.1 (2023 18:30)  T(F): 98 (2023 22:11), Max: 98.7 (2023 18:30)  HR: 85 (2023 22:11) (82 - 86)  BP: 122/74 (2023 22:11) (106/81 - 150/76)  BP(mean): 93 (2023 18:30) (87 - 109)  ABP: --  ABP(mean): --  RR: 22 (2023 22:11) (19 - 24)  SpO2: 93% (2023 22:11) (93% - 100%)    O2 Parameters below as of 2023 22:11  Patient On (Oxygen Delivery Method): nasal cannula  O2 Flow (L/min): 3          ABG - ( 2023 21:06 )  pH, Arterial: 7.35  pH, Blood: x     /  pCO2: 61    /  pO2: 138   / HCO3: 34    / Base Excess: 6.1   /  SaO2: 99                        LABS:                        13.0   4.76  )-----------( 332      ( 2023 17:42 )             46.2     04-09    139  |  102  |  59<H>  ----------------------------<  148<H>  5.5<H>   |  34<H>  |  1.18    Ca    9.4      2023 17:42  Mg     2.5     -    TPro  7.6  /  Alb  3.7  /  TBili  0.9  /  DBili  x   /  AST  67<H>  /  ALT  40  /  AlkPhos  115  -          CAPILLARY BLOOD GLUCOSE        Physical Examination:    General:   GCS 15, Alert, oriented x 3, interactive, nonfocal, comfortable off NIPPV    HEENT: Pupils equal, reactive to light.  Symmetric.    PULM: diminished BS bilateral bases, faint expiratory wheeze    CVS: Regular rate and rhythm    ABD: obese, Soft, nondistended, nontender, normoactive bowel sounds    EXT: + LE edema bilaterally, nontender    SKIN: Warm and well perfused, no rashes noted.    RADIOLOGY:   ACC: [id number] EXAM:  CT ANGIO CHEST PULM ART WAWIC   ORDERED BY: RON MARCELINO     PROCEDURE DATE:  2023          INTERPRETATION:  CLINICAL INFORMATION: Hypoxia and shortness of breath    COMPARISON: CT chest 2016.    CONTRAST/COMPLICATIONS:  IV Contrast: Omnipaque 350  80 cc administered   20 cc discarded  Oral Contrast: NONE  Complications: None reported at time of study completion    PROCEDURE:  CT Angiography of the Chest.  Sagittal and coronal reformats were performed as well as 3D (MIP)   reconstructions.    FINDINGS:    LUNGS AND AIRWAYS: Patent central airways. Compressive atelectasis in the   right middle and bilateral lower lobes adjacent to pleural effusions.   Linear type scarring in the right upper lobe. PLEURA: Small right and   trace left pleural effusions.  MEDIASTINUM AND MARTINEZ: No lymphadenopathy.  VESSELS: No pulmonary embolus. Atherosclerotic calcifications of the   aorta and coronary arteries..  HEART: Mild cardiomegaly. No pericardial effusion.  CHEST WALL AND LOWER NECK: Within normal limits.  VISUALIZED UPPER ABDOMEN: Small volume upper abdominal ascites, grossly   unchanged when compared to prior study. Cholelithiasis.  BONES: Degenerative changes of the spine.    IMPRESSION:  No pulmonary embolus.    Small right and trace left pleural effusions with adjacent compressive   atelectasis.    Small volume upper abdominal ascites.        --- End of Report ---            EDSON BUTLER MD; Attending Radiologist  This document has been electronically signed. 2023 10:49PM

## 2023-04-09 NOTE — ED ADULT NURSE NOTE - CAS TRG GEN SKIN CONDITION
Diagnostic MAYANK and US is showing no evidence of malignancy. Recommendation is follow up in 1 year for screening MAYANK and follow up if needed for lump in the left axilla. Warm

## 2023-04-09 NOTE — ED PROVIDER NOTE - DIFFERENTIAL DIAGNOSIS
lower extremity swelling, SOB, PAINTER, hypoxia r/o CHF, COPD. Patient with COPD excerbation s/p Bipap, ICU consulted, ICU saw patient, states patient is safe for admission to Tele, no CICU or ICU as per ICU. Defer placement decision to ICU. CT angio shows no PE however evidence of effusion and possible congestion with abdominal ascites. ICU ordered patient for lasix. BP systolic 100/70 MAP more than 60. Will give brisk hydration, follow up with BP. Signed out the care of the patient to Dr. Villarreal. Hospitalist admission appreciated. Differential Diagnosis

## 2023-04-09 NOTE — ED PROVIDER NOTE - OBJECTIVE STATEMENT
67 y/o F w/ PMHx of  COPD (on nocturnal home O2), DM2, pulm HTN, LBBB, HTN, HLD presents to ED c/o respiratory distress, reported improvement as per EMS. O2 53% upon arrival and has not taken any medications today. pt is on baby ASA. endorses worsening LE swelling x several weeks and sob currently. adds that she was admitted to  with COPD exacerbation x 1 month ago. denies cp and hx of cancer related issues. notes that she is allergic to latex.  PCP: Dr. Mary. Pulmonologist: Dr. Mauricio

## 2023-04-09 NOTE — ED PROVIDER NOTE - NS ED MD DISPO SPECIAL CONSIDERATION1
PT CARE ASSUMMED AT 0700, PT ALERT AND ORIENTED X4, FORGETFUL AT TIMES. DENIE
ANY PAIN, ANUSEA AND VOMITTING. ON 4L OF OXYGEN, SOB WITH EXERTION, LUNGS
SOUND COARSY AND WHEEZY, PT ON BEATHING TX. PT UP IN THE CHAIR. DENIES ANY
DENIES AT THE MOMENT. FALL PRECAUTIONS IN PLACE. WILL CONTINUE TO MONITOR
 
1127 PT CONVALUSENT PLASMA STARTED, IN THE ROOM FOR 15MINS, NO REACTION
NOTED. VITAL SIGNS STABLE. Close Observation

## 2023-04-10 DIAGNOSIS — J44.1 CHRONIC OBSTRUCTIVE PULMONARY DISEASE WITH (ACUTE) EXACERBATION: ICD-10-CM

## 2023-04-10 DIAGNOSIS — Z86.79 PERSONAL HISTORY OF OTHER DISEASES OF THE CIRCULATORY SYSTEM: ICD-10-CM

## 2023-04-10 DIAGNOSIS — I50.33 ACUTE ON CHRONIC DIASTOLIC (CONGESTIVE) HEART FAILURE: ICD-10-CM

## 2023-04-10 DIAGNOSIS — I27.20 PULMONARY HYPERTENSION, UNSPECIFIED: ICD-10-CM

## 2023-04-10 PROBLEM — J44.9 CHRONIC OBSTRUCTIVE PULMONARY DISEASE, UNSPECIFIED: Chronic | Status: ACTIVE | Noted: 2023-03-10

## 2023-04-10 PROBLEM — I10 ESSENTIAL (PRIMARY) HYPERTENSION: Chronic | Status: ACTIVE | Noted: 2023-03-10

## 2023-04-10 LAB
A1C WITH ESTIMATED AVERAGE GLUCOSE RESULT: 7.3 % — HIGH (ref 4–5.6)
ADD ON TEST-SPECIMEN IN LAB: SIGNIFICANT CHANGE UP
ALBUMIN SERPL ELPH-MCNC: 3.4 G/DL — SIGNIFICANT CHANGE UP (ref 3.3–5)
ALP SERPL-CCNC: 100 U/L — SIGNIFICANT CHANGE UP (ref 40–120)
ALT FLD-CCNC: 40 U/L — SIGNIFICANT CHANGE UP (ref 12–78)
ANION GAP SERPL CALC-SCNC: 4 MMOL/L — LOW (ref 5–17)
AST SERPL-CCNC: 51 U/L — HIGH (ref 15–37)
BASE EXCESS BLDV CALC-SCNC: 4.9 MMOL/L — SIGNIFICANT CHANGE UP
BASOPHILS # BLD AUTO: 0.01 K/UL — SIGNIFICANT CHANGE UP (ref 0–0.2)
BASOPHILS NFR BLD AUTO: 0.2 % — SIGNIFICANT CHANGE UP (ref 0–2)
BILIRUB SERPL-MCNC: 0.6 MG/DL — SIGNIFICANT CHANGE UP (ref 0.2–1.2)
BUN SERPL-MCNC: 65 MG/DL — HIGH (ref 7–23)
CALCIUM SERPL-MCNC: 9.2 MG/DL — SIGNIFICANT CHANGE UP (ref 8.5–10.1)
CHLORIDE SERPL-SCNC: 102 MMOL/L — SIGNIFICANT CHANGE UP (ref 96–108)
CO2 BLDV-SCNC: 33 MMOL/L — HIGH (ref 22–26)
CO2 SERPL-SCNC: 30 MMOL/L — SIGNIFICANT CHANGE UP (ref 22–31)
CREAT SERPL-MCNC: 1.21 MG/DL — SIGNIFICANT CHANGE UP (ref 0.5–1.3)
EGFR: 48 ML/MIN/1.73M2 — LOW
EOSINOPHIL # BLD AUTO: 0 K/UL — SIGNIFICANT CHANGE UP (ref 0–0.5)
EOSINOPHIL NFR BLD AUTO: 0 % — SIGNIFICANT CHANGE UP (ref 0–6)
ESTIMATED AVERAGE GLUCOSE: 163 MG/DL — HIGH (ref 68–114)
GLUCOSE BLDC GLUCOMTR-MCNC: 155 MG/DL — HIGH (ref 70–99)
GLUCOSE BLDC GLUCOMTR-MCNC: 178 MG/DL — HIGH (ref 70–99)
GLUCOSE BLDC GLUCOMTR-MCNC: 178 MG/DL — HIGH (ref 70–99)
GLUCOSE BLDC GLUCOMTR-MCNC: 222 MG/DL — HIGH (ref 70–99)
GLUCOSE SERPL-MCNC: 178 MG/DL — HIGH (ref 70–99)
GLUCOSE SERPL-MCNC: 180 MG/DL — HIGH (ref 70–99)
HCO3 BLDV-SCNC: 31 MMOL/L — HIGH (ref 22–29)
HCT VFR BLD CALC: 42.6 % — SIGNIFICANT CHANGE UP (ref 34.5–45)
HGB BLD-MCNC: 12.2 G/DL — SIGNIFICANT CHANGE UP (ref 11.5–15.5)
IMM GRANULOCYTES NFR BLD AUTO: 0.7 % — SIGNIFICANT CHANGE UP (ref 0–0.9)
LYMPHOCYTES # BLD AUTO: 0.19 K/UL — LOW (ref 1–3.3)
LYMPHOCYTES # BLD AUTO: 4.7 % — LOW (ref 13–44)
MAGNESIUM SERPL-MCNC: 2.7 MG/DL — HIGH (ref 1.6–2.6)
MCHC RBC-ENTMCNC: 25.1 PG — LOW (ref 27–34)
MCHC RBC-ENTMCNC: 28.6 GM/DL — LOW (ref 32–36)
MCV RBC AUTO: 87.5 FL — SIGNIFICANT CHANGE UP (ref 80–100)
MONOCYTES # BLD AUTO: 0.34 K/UL — SIGNIFICANT CHANGE UP (ref 0–0.9)
MONOCYTES NFR BLD AUTO: 8.4 % — SIGNIFICANT CHANGE UP (ref 2–14)
NEUTROPHILS # BLD AUTO: 3.5 K/UL — SIGNIFICANT CHANGE UP (ref 1.8–7.4)
NEUTROPHILS NFR BLD AUTO: 86 % — HIGH (ref 43–77)
PCO2 BLDV: 53 MMHG — HIGH (ref 39–42)
PH BLDV: 7.38 — SIGNIFICANT CHANGE UP (ref 7.32–7.43)
PLATELET # BLD AUTO: 299 K/UL — SIGNIFICANT CHANGE UP (ref 150–400)
PO2 BLDV: 167 MMHG — SIGNIFICANT CHANGE UP
POTASSIUM SERPL-MCNC: 5.1 MMOL/L — SIGNIFICANT CHANGE UP (ref 3.5–5.3)
POTASSIUM SERPL-SCNC: 5.1 MMOL/L — SIGNIFICANT CHANGE UP (ref 3.5–5.3)
PROT SERPL-MCNC: 7.1 GM/DL — SIGNIFICANT CHANGE UP (ref 6–8.3)
RAPID RVP RESULT: SIGNIFICANT CHANGE UP
RBC # BLD: 4.87 M/UL — SIGNIFICANT CHANGE UP (ref 3.8–5.2)
RBC # FLD: 18.2 % — HIGH (ref 10.3–14.5)
SAO2 % BLDV: 99.5 % — SIGNIFICANT CHANGE UP
SARS-COV-2 RNA SPEC QL NAA+PROBE: SIGNIFICANT CHANGE UP
SODIUM SERPL-SCNC: 136 MMOL/L — SIGNIFICANT CHANGE UP (ref 135–145)
WBC # BLD: 4.07 K/UL — SIGNIFICANT CHANGE UP (ref 3.8–10.5)
WBC # FLD AUTO: 4.07 K/UL — SIGNIFICANT CHANGE UP (ref 3.8–10.5)

## 2023-04-10 PROCEDURE — 83521 IG LIGHT CHAINS FREE EACH: CPT

## 2023-04-10 PROCEDURE — 82962 GLUCOSE BLOOD TEST: CPT

## 2023-04-10 PROCEDURE — 83880 ASSAY OF NATRIURETIC PEPTIDE: CPT

## 2023-04-10 PROCEDURE — 85027 COMPLETE CBC AUTOMATED: CPT

## 2023-04-10 PROCEDURE — 93308 TTE F-UP OR LMTD: CPT

## 2023-04-10 PROCEDURE — 99233 SBSQ HOSP IP/OBS HIGH 50: CPT

## 2023-04-10 PROCEDURE — 82803 BLOOD GASES ANY COMBINATION: CPT

## 2023-04-10 PROCEDURE — C1887: CPT

## 2023-04-10 PROCEDURE — 83036 HEMOGLOBIN GLYCOSYLATED A1C: CPT

## 2023-04-10 PROCEDURE — 93451 RIGHT HEART CATH: CPT

## 2023-04-10 PROCEDURE — C1769: CPT

## 2023-04-10 PROCEDURE — C1894: CPT

## 2023-04-10 PROCEDURE — C1889: CPT

## 2023-04-10 PROCEDURE — 97530 THERAPEUTIC ACTIVITIES: CPT | Mod: GP

## 2023-04-10 PROCEDURE — 86038 ANTINUCLEAR ANTIBODIES: CPT

## 2023-04-10 PROCEDURE — 36415 COLL VENOUS BLD VENIPUNCTURE: CPT

## 2023-04-10 PROCEDURE — 84100 ASSAY OF PHOSPHORUS: CPT

## 2023-04-10 PROCEDURE — 94760 N-INVAS EAR/PLS OXIMETRY 1: CPT

## 2023-04-10 PROCEDURE — 83735 ASSAY OF MAGNESIUM: CPT

## 2023-04-10 PROCEDURE — 86334 IMMUNOFIX E-PHORESIS SERUM: CPT

## 2023-04-10 PROCEDURE — 94640 AIRWAY INHALATION TREATMENT: CPT

## 2023-04-10 PROCEDURE — 94660 CPAP INITIATION&MGMT: CPT

## 2023-04-10 PROCEDURE — 82947 ASSAY GLUCOSE BLOOD QUANT: CPT

## 2023-04-10 PROCEDURE — 99223 1ST HOSP IP/OBS HIGH 75: CPT

## 2023-04-10 PROCEDURE — 87635 SARS-COV-2 COVID-19 AMP PRB: CPT

## 2023-04-10 PROCEDURE — 86225 DNA ANTIBODY NATIVE: CPT

## 2023-04-10 PROCEDURE — 97116 GAIT TRAINING THERAPY: CPT | Mod: GP

## 2023-04-10 PROCEDURE — 80053 COMPREHEN METABOLIC PANEL: CPT

## 2023-04-10 PROCEDURE — 85025 COMPLETE CBC W/AUTO DIFF WBC: CPT

## 2023-04-10 PROCEDURE — 80048 BASIC METABOLIC PNL TOTAL CA: CPT

## 2023-04-10 RX ORDER — SODIUM ZIRCONIUM CYCLOSILICATE 10 G/10G
10 POWDER, FOR SUSPENSION ORAL ONCE
Refills: 0 | Status: COMPLETED | OUTPATIENT
Start: 2023-04-10 | End: 2023-04-10

## 2023-04-10 RX ORDER — TIOTROPIUM BROMIDE 18 UG/1
2 CAPSULE ORAL; RESPIRATORY (INHALATION) DAILY
Refills: 0 | Status: DISCONTINUED | OUTPATIENT
Start: 2023-04-10 | End: 2023-04-18

## 2023-04-10 RX ORDER — FUROSEMIDE 40 MG
40 TABLET ORAL THREE TIMES A DAY
Refills: 0 | Status: DISCONTINUED | OUTPATIENT
Start: 2023-04-10 | End: 2023-04-12

## 2023-04-10 RX ORDER — DEXTROSE 50 % IN WATER 50 %
25 SYRINGE (ML) INTRAVENOUS ONCE
Refills: 0 | Status: DISCONTINUED | OUTPATIENT
Start: 2023-04-10 | End: 2023-04-18

## 2023-04-10 RX ORDER — PREGABALIN 225 MG/1
1 CAPSULE ORAL
Qty: 0 | Refills: 0 | DISCHARGE

## 2023-04-10 RX ORDER — ASPIRIN/CALCIUM CARB/MAGNESIUM 324 MG
1 TABLET ORAL
Qty: 0 | Refills: 0 | DISCHARGE

## 2023-04-10 RX ORDER — SODIUM CHLORIDE 9 MG/ML
1000 INJECTION, SOLUTION INTRAVENOUS
Refills: 0 | Status: DISCONTINUED | OUTPATIENT
Start: 2023-04-10 | End: 2023-04-18

## 2023-04-10 RX ORDER — DEXTROSE 50 % IN WATER 50 %
12.5 SYRINGE (ML) INTRAVENOUS ONCE
Refills: 0 | Status: DISCONTINUED | OUTPATIENT
Start: 2023-04-10 | End: 2023-04-18

## 2023-04-10 RX ORDER — ONDANSETRON 8 MG/1
4 TABLET, FILM COATED ORAL EVERY 8 HOURS
Refills: 0 | Status: DISCONTINUED | OUTPATIENT
Start: 2023-04-10 | End: 2023-04-18

## 2023-04-10 RX ORDER — LOSARTAN POTASSIUM 100 MG/1
100 TABLET, FILM COATED ORAL DAILY
Refills: 0 | Status: DISCONTINUED | OUTPATIENT
Start: 2023-04-10 | End: 2023-04-17

## 2023-04-10 RX ORDER — SENNA PLUS 8.6 MG/1
2 TABLET ORAL AT BEDTIME
Refills: 0 | Status: DISCONTINUED | OUTPATIENT
Start: 2023-04-10 | End: 2023-04-18

## 2023-04-10 RX ORDER — ATORVASTATIN CALCIUM 80 MG/1
1 TABLET, FILM COATED ORAL
Qty: 0 | Refills: 0 | DISCHARGE

## 2023-04-10 RX ORDER — LANOLIN ALCOHOL/MO/W.PET/CERES
3 CREAM (GRAM) TOPICAL AT BEDTIME
Refills: 0 | Status: DISCONTINUED | OUTPATIENT
Start: 2023-04-10 | End: 2023-04-18

## 2023-04-10 RX ORDER — CHOLECALCIFEROL (VITAMIN D3) 125 MCG
1 CAPSULE ORAL
Qty: 0 | Refills: 0 | DISCHARGE

## 2023-04-10 RX ORDER — DEXTROSE 50 % IN WATER 50 %
15 SYRINGE (ML) INTRAVENOUS ONCE
Refills: 0 | Status: DISCONTINUED | OUTPATIENT
Start: 2023-04-10 | End: 2023-04-18

## 2023-04-10 RX ORDER — MAGNESIUM OXIDE 400 MG ORAL TABLET 241.3 MG
1 TABLET ORAL
Qty: 0 | Refills: 0 | DISCHARGE

## 2023-04-10 RX ORDER — MAGNESIUM OXIDE 400 MG ORAL TABLET 241.3 MG
400 TABLET ORAL DAILY
Refills: 0 | Status: DISCONTINUED | OUTPATIENT
Start: 2023-04-10 | End: 2023-04-18

## 2023-04-10 RX ORDER — INSULIN LISPRO 100/ML
VIAL (ML) SUBCUTANEOUS AT BEDTIME
Refills: 0 | Status: DISCONTINUED | OUTPATIENT
Start: 2023-04-10 | End: 2023-04-18

## 2023-04-10 RX ORDER — INSULIN LISPRO 100/ML
6 VIAL (ML) SUBCUTANEOUS
Refills: 0 | Status: DISCONTINUED | OUTPATIENT
Start: 2023-04-10 | End: 2023-04-16

## 2023-04-10 RX ORDER — METOPROLOL TARTRATE 50 MG
1 TABLET ORAL
Qty: 0 | Refills: 0 | DISCHARGE

## 2023-04-10 RX ORDER — CHOLECALCIFEROL (VITAMIN D3) 125 MCG
1000 CAPSULE ORAL DAILY
Refills: 0 | Status: DISCONTINUED | OUTPATIENT
Start: 2023-04-10 | End: 2023-04-18

## 2023-04-10 RX ORDER — PREGABALIN 225 MG/1
1000 CAPSULE ORAL DAILY
Refills: 0 | Status: DISCONTINUED | OUTPATIENT
Start: 2023-04-10 | End: 2023-04-18

## 2023-04-10 RX ORDER — HEPARIN SODIUM 5000 [USP'U]/ML
5000 INJECTION INTRAVENOUS; SUBCUTANEOUS EVERY 12 HOURS
Refills: 0 | Status: DISCONTINUED | OUTPATIENT
Start: 2023-04-10 | End: 2023-04-18

## 2023-04-10 RX ORDER — ASPIRIN/CALCIUM CARB/MAGNESIUM 324 MG
81 TABLET ORAL DAILY
Refills: 0 | Status: DISCONTINUED | OUTPATIENT
Start: 2023-04-10 | End: 2023-04-18

## 2023-04-10 RX ORDER — FLUTICASONE PROPIONATE AND SALMETEROL 50; 250 UG/1; UG/1
1 POWDER ORAL; RESPIRATORY (INHALATION)
Qty: 0 | Refills: 0 | DISCHARGE

## 2023-04-10 RX ORDER — INSULIN LISPRO 100/ML
VIAL (ML) SUBCUTANEOUS
Refills: 0 | Status: DISCONTINUED | OUTPATIENT
Start: 2023-04-10 | End: 2023-04-18

## 2023-04-10 RX ORDER — POLYETHYLENE GLYCOL 3350 17 G/17G
17 POWDER, FOR SOLUTION ORAL DAILY
Refills: 0 | Status: DISCONTINUED | OUTPATIENT
Start: 2023-04-10 | End: 2023-04-18

## 2023-04-10 RX ORDER — ATORVASTATIN CALCIUM 80 MG/1
20 TABLET, FILM COATED ORAL AT BEDTIME
Refills: 0 | Status: DISCONTINUED | OUTPATIENT
Start: 2023-04-10 | End: 2023-04-18

## 2023-04-10 RX ORDER — METOPROLOL TARTRATE 50 MG
50 TABLET ORAL DAILY
Refills: 0 | Status: DISCONTINUED | OUTPATIENT
Start: 2023-04-10 | End: 2023-04-18

## 2023-04-10 RX ORDER — BUDESONIDE AND FORMOTEROL FUMARATE DIHYDRATE 160; 4.5 UG/1; UG/1
2 AEROSOL RESPIRATORY (INHALATION)
Refills: 0 | Status: DISCONTINUED | OUTPATIENT
Start: 2023-04-10 | End: 2023-04-18

## 2023-04-10 RX ORDER — FUROSEMIDE 40 MG
40 TABLET ORAL
Refills: 0 | Status: DISCONTINUED | OUTPATIENT
Start: 2023-04-10 | End: 2023-04-10

## 2023-04-10 RX ORDER — ALBUTEROL 90 UG/1
2 AEROSOL, METERED ORAL EVERY 4 HOURS
Refills: 0 | Status: DISCONTINUED | OUTPATIENT
Start: 2023-04-10 | End: 2023-04-18

## 2023-04-10 RX ORDER — ACETAMINOPHEN 500 MG
650 TABLET ORAL EVERY 6 HOURS
Refills: 0 | Status: DISCONTINUED | OUTPATIENT
Start: 2023-04-10 | End: 2023-04-18

## 2023-04-10 RX ORDER — GLUCAGON INJECTION, SOLUTION 0.5 MG/.1ML
1 INJECTION, SOLUTION SUBCUTANEOUS ONCE
Refills: 0 | Status: DISCONTINUED | OUTPATIENT
Start: 2023-04-10 | End: 2023-04-18

## 2023-04-10 RX ORDER — INSULIN GLARGINE 100 [IU]/ML
20 INJECTION, SOLUTION SUBCUTANEOUS AT BEDTIME
Refills: 0 | Status: DISCONTINUED | OUTPATIENT
Start: 2023-04-10 | End: 2023-04-16

## 2023-04-10 RX ADMIN — Medication 40 MILLIGRAM(S): at 22:18

## 2023-04-10 RX ADMIN — ALBUTEROL 2 PUFF(S): 90 AEROSOL, METERED ORAL at 15:56

## 2023-04-10 RX ADMIN — Medication 40 MILLIGRAM(S): at 07:55

## 2023-04-10 RX ADMIN — Medication 81 MILLIGRAM(S): at 10:57

## 2023-04-10 RX ADMIN — Medication 2: at 17:43

## 2023-04-10 RX ADMIN — BUDESONIDE AND FORMOTEROL FUMARATE DIHYDRATE 2 PUFF(S): 160; 4.5 AEROSOL RESPIRATORY (INHALATION) at 08:59

## 2023-04-10 RX ADMIN — Medication 40 MILLIGRAM(S): at 12:44

## 2023-04-10 RX ADMIN — POLYETHYLENE GLYCOL 3350 17 GRAM(S): 17 POWDER, FOR SOLUTION ORAL at 10:57

## 2023-04-10 RX ADMIN — INSULIN GLARGINE 20 UNIT(S): 100 INJECTION, SOLUTION SUBCUTANEOUS at 22:19

## 2023-04-10 RX ADMIN — MAGNESIUM OXIDE 400 MG ORAL TABLET 400 MILLIGRAM(S): 241.3 TABLET ORAL at 10:56

## 2023-04-10 RX ADMIN — PREGABALIN 1000 MICROGRAM(S): 225 CAPSULE ORAL at 10:57

## 2023-04-10 RX ADMIN — Medication 6 UNIT(S): at 17:44

## 2023-04-10 RX ADMIN — SODIUM ZIRCONIUM CYCLOSILICATE 10 GRAM(S): 10 POWDER, FOR SUSPENSION ORAL at 03:19

## 2023-04-10 RX ADMIN — Medication 3 MILLIGRAM(S): at 22:18

## 2023-04-10 RX ADMIN — Medication 40 MILLIGRAM(S): at 15:54

## 2023-04-10 RX ADMIN — SODIUM CHLORIDE 250 MILLILITER(S): 9 INJECTION INTRAMUSCULAR; INTRAVENOUS; SUBCUTANEOUS at 00:10

## 2023-04-10 RX ADMIN — SENNA PLUS 2 TABLET(S): 8.6 TABLET ORAL at 22:18

## 2023-04-10 RX ADMIN — ALBUTEROL 2 PUFF(S): 90 AEROSOL, METERED ORAL at 08:59

## 2023-04-10 RX ADMIN — Medication 1000 UNIT(S): at 10:57

## 2023-04-10 RX ADMIN — Medication 40 MILLIGRAM(S): at 07:54

## 2023-04-10 RX ADMIN — HEPARIN SODIUM 5000 UNIT(S): 5000 INJECTION INTRAVENOUS; SUBCUTANEOUS at 10:57

## 2023-04-10 RX ADMIN — BUDESONIDE AND FORMOTEROL FUMARATE DIHYDRATE 2 PUFF(S): 160; 4.5 AEROSOL RESPIRATORY (INHALATION) at 21:34

## 2023-04-10 RX ADMIN — LOSARTAN POTASSIUM 100 MILLIGRAM(S): 100 TABLET, FILM COATED ORAL at 10:57

## 2023-04-10 RX ADMIN — Medication 40 MILLIGRAM(S): at 23:32

## 2023-04-10 RX ADMIN — ATORVASTATIN CALCIUM 20 MILLIGRAM(S): 80 TABLET, FILM COATED ORAL at 22:19

## 2023-04-10 RX ADMIN — ALBUTEROL 2 PUFF(S): 90 AEROSOL, METERED ORAL at 21:33

## 2023-04-10 RX ADMIN — Medication 6 UNIT(S): at 07:56

## 2023-04-10 RX ADMIN — HEPARIN SODIUM 5000 UNIT(S): 5000 INJECTION INTRAVENOUS; SUBCUTANEOUS at 22:18

## 2023-04-10 RX ADMIN — Medication 6 UNIT(S): at 12:46

## 2023-04-10 RX ADMIN — Medication 2: at 12:46

## 2023-04-10 RX ADMIN — TIOTROPIUM BROMIDE 2 PUFF(S): 18 CAPSULE ORAL; RESPIRATORY (INHALATION) at 08:58

## 2023-04-10 RX ADMIN — Medication 2: at 07:56

## 2023-04-10 RX ADMIN — Medication 50 MILLIGRAM(S): at 10:58

## 2023-04-10 RX ADMIN — Medication 40 MILLIGRAM(S): at 17:45

## 2023-04-10 RX ADMIN — ALBUTEROL 2 PUFF(S): 90 AEROSOL, METERED ORAL at 03:53

## 2023-04-10 NOTE — PROGRESS NOTE ADULT - SUBJECTIVE AND OBJECTIVE BOX
Chief Complaint: Patient is a 68y old  Female who presents with a chief complaint of Low SpO2 (10 Apr 2023 11:42)      Interval events:   - VSS with O2 >93% on 50% venti mask   - Reports continued SOB/LE edema, constipation , no other current complaints     ROS:   Patient denies any current chest pain, cough, f/c/n/v/d, abd pain, myalgias, dysuria, HA, dizziness  All other review of systems is negative unless indicated above    Physical Exam:  Vital Signs Last 24 Hrs  T(C): 37 (10 Apr 2023 15:57), Max: 37.4 (10 Apr 2023 08:07)  T(F): 98.6 (10 Apr 2023 15:57), Max: 99.3 (10 Apr 2023 08:07)  HR: 81 (10 Apr 2023 15:57) (80 - 104)  BP: 109/50 (10 Apr 2023 15:57) (106/81 - 150/76)  BP(mean): 93 (09 Apr 2023 18:30) (87 - 109)  RR: 18 (10 Apr 2023 15:57) (18 - 24)  SpO2: 95% (10 Apr 2023 15:57) (91% - 100%)    Parameters below as of 10 Apr 2023 15:57  Patient On (Oxygen Delivery Method): mask, Venturi    O2 Concentration (%): 50    Constitutional: NAD, awake and alert, obese female   HEENT: PERRLA, EOMI, MMM  Respiratory: dec BS at bases / expiratory wheeze   Cardiovascular: S1 and S2, RRR, no murmurs, gallops or rubs  Gastrointestinal: +BS, soft, non-tender, non-distended, no CVA tenderness  Extremities: +LE edema b/l, +DP pulses b/l  Neurological: A&O x 3, no focal deficits  Musculoskeletal: 5/5 strength b/l upper and lower extremities  Skin: Normal, skin warm and dry    Labs:  04-10 @ 08:24  Glucose 178 mg/dL  HCO3 30 mmol/L  Chloride 102 mmol/L  Sodium 136 mmol/L>   Potassium 5.1 mmol/L  Creatinine 1.21 mg/dL  Calcium 9.2 mg/dL  BUN 65 mg/dL  eGFR 48 mL/min/1.73m2  Anion gap 4 mmol/L    WBC 4.07  Hemoglobin 12.2  Hemoatocrit 42.6  Platelet count 299          Cardiac testing:  Troponin I, High Sensitivity Result: 36.31 ng/L (04-09-23 @ 21:05)  Troponin I, High Sensitivity Result: 34.35 ng/L (04-09-23 @ 17:42)        12 Lead ECG:   Ventricular Rate 86 BPM    Atrial Rate 86 BPM    P-R Interval 162 ms    QRS Duration 126 ms    Q-T Interval 406 ms    QTC Calculation(Bazett) 485 ms    P Axis 65 degrees    R Axis 230 degrees    T Axis 86 degrees    Diagnosis Line Sinus rhythm withfrequent Premature ventricular complexes  Indeterminate axis  Intraventricular conduction block  Possible Anterolateral infarct (cited on or before 16-FEB-2016)  Abnormal ECG  When compared with ECG of 10-MAR-2023 07:38,  Premature ventricular complexes are now Present  QRS axis Shifted left  Confirmed by CORNELIUS MELENDEZ ([id number]) on 4/10/2023 4:54:07 PM (04-09-23 @ 17:49)      TTE Echo Complete w/ Contrast w/ Doppler:   PROCEDURE DATE:  03/10/2023      INTERPRETATION:  Transthoracic Echocardiography Report (TTE)   Impression     Summary     Endocardium is not well visualized, however, overall left ventricular   systolic function appears normal. Technically Difficult Study.   Septal flattening is seen; this finding is consistent with right heart   pressure / volume overload.   Estimated left ventricular ejection fraction is 55-60 %.   Normal appearing left atrium.   The right atriumappears moderately dilated.   The right ventricle is severely dilated.   The right ventricular apex appears hypokinetic.   The aortic valve is trileaflet with thin pliable leaflets.   Moderate mitral annular calcification is present.   There is calcification of mitral valve leaflets. The leaflet opening is   normal.   EA reversal of the mitral inflow consistent with reduced compliance of   the   left ventricle.   Trace mitral regurgitation is present.   The tricuspid valve leaflets are thin and pliable; valve motion is   normal.   Moderate (2+) tricuspid valve regurgitation is present.   Severe pulmonary hypertension.   Pulmonic valve not well seen.   No evidence of pericardial effusion.   No evidence of pleural effusion.   IVC is dilated andnot collapsing with inspiration.     Signature     ----------------------------------------------------------------   Electronically signed by Lyssa Horta MD(Interpreting   physician) on 03/10/2023 10:44 AM   ----------------------------------------------------------------      Radiology:  < from: CT Angio Chest PE Protocol w/ IV Cont (04.09.23 @ 21:49) >    IMPRESSION:  No pulmonary embolus.    Small right and trace left pleural effusions with adjacent compressive   atelectasis.    Small volume upper abdominal ascites.    < end of copied text >      Medications:  MEDICATIONS  (STANDING):  albuterol    90 MICROgram(s) HFA Inhaler 2 Puff(s) Inhalation every 4 hours  aspirin enteric coated 81 milliGRAM(s) Oral daily  atorvastatin 20 milliGRAM(s) Oral at bedtime  budesonide 160 MICROgram(s)/formoterol 4.5 MICROgram(s) Inhaler 2 Puff(s) Inhalation two times a day  cholecalciferol 1000 Unit(s) Oral daily  cyanocobalamin 1000 MICROGram(s) Oral daily  dextrose 5%. 1000 milliLiter(s) (50 mL/Hr) IV Continuous <Continuous>  dextrose 5%. 1000 milliLiter(s) (100 mL/Hr) IV Continuous <Continuous>  dextrose 50% Injectable 25 Gram(s) IV Push once  dextrose 50% Injectable 12.5 Gram(s) IV Push once  dextrose 50% Injectable 25 Gram(s) IV Push once  furosemide   Injectable 40 milliGRAM(s) IV Push two times a day  glucagon  Injectable 1 milliGRAM(s) IntraMuscular once  heparin   Injectable 5000 Unit(s) SubCutaneous every 12 hours  hydrochlorothiazide 12.5 milliGRAM(s) Oral daily  insulin glargine Injectable (LANTUS) 20 Unit(s) SubCutaneous at bedtime  insulin lispro (ADMELOG) corrective regimen sliding scale   SubCutaneous three times a day before meals  insulin lispro (ADMELOG) corrective regimen sliding scale   SubCutaneous at bedtime  insulin lispro Injectable (ADMELOG) 6 Unit(s) SubCutaneous three times a day before meals  losartan 100 milliGRAM(s) Oral daily  magnesium oxide 400 milliGRAM(s) Oral daily  methylPREDNISolone sodium succinate Injectable 40 milliGRAM(s) IV Push every 6 hours  metoprolol succinate ER 50 milliGRAM(s) Oral daily  polyethylene glycol 3350 17 Gram(s) Oral daily  senna 2 Tablet(s) Oral at bedtime  tiotropium 2.5 MICROgram(s) Inhaler 2 Puff(s) Inhalation daily    MEDICATIONS  (PRN):  acetaminophen     Tablet .. 650 milliGRAM(s) Oral every 6 hours PRN Temp greater or equal to 38C (100.4F), Mild Pain (1 - 3)  aluminum hydroxide/magnesium hydroxide/simethicone Suspension 30 milliLiter(s) Oral every 4 hours PRN Dyspepsia  dextrose Oral Gel 15 Gram(s) Oral once PRN Blood Glucose LESS THAN 70 milliGRAM(s)/deciliter  melatonin 3 milliGRAM(s) Oral at bedtime PRN Insomnia  ondansetron Injectable 4 milliGRAM(s) IV Push every 8 hours PRN Nausea and/or Vomiting

## 2023-04-10 NOTE — H&P ADULT - NSICDXFAMILYHX_GEN_ALL_CORE_FT
FAMILY HISTORY:  FH: heart disease    Father  Still living? Unknown  FH: COPD (chronic obstructive pulmonary disease), Age at diagnosis: Age Unknown  FH: lung cancer, Age at diagnosis: Age Unknown    Mother  Still living? Unknown  FH: CHF (congestive heart failure), Age at diagnosis: Age Unknown  FH: COPD (chronic obstructive pulmonary disease), Age at diagnosis: Age Unknown

## 2023-04-10 NOTE — H&P ADULT - HISTORY OF PRESENT ILLNESS
67 y/o F with PMH COPD (on 3L O2 PRN), Type 2 DM, pulmonary HTN, LBBB, HTN, HLD presented with low SpO2 at home. Pt states that she did not feel that her lower extremity swelling improved sinc being home. She was in bed all day florentin and did not wake up until later in the day. She took her SpO2 which was 53% on room air. Reports lower extremity swelling, abdominal swelling, facial swelling, increased thirst. Does not feel that she snores or is fatigued during the day time. Has not had a bowel movement in a few days. Denies fevers, chills, chest pain, N/V, diarrhea.    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 Labs: neutrophils 82%, K+ 5.5, glucose 148, AST 67, BNP 8970. VBG: pH 7.26, CO2 81, HCO3 36 -> ABG: pH 7.35, CO2 61, HCO3 24. COVID, influenza A/B, RSV negative. EKG: NSR with HR 86 bpm, PVCs,  ms (personally reviewed). CTA: No pulmonary embolus. Small right and trace left pleural effusions with adjacent compressive atelectasis. Small volume upper abdominal ascites. Cholithiasis.     Pt was given 250 ml of NS, lasix 40 mg IVP, Ipatropium nebulizer, magnesium sulfate, 125 mg IVP Solumedrol. She is being placed on telemetry observation for further management.

## 2023-04-10 NOTE — H&P ADULT - NSICDXPASTMEDICALHX_GEN_ALL_CORE_FT
PAST MEDICAL HISTORY:  COPD, severe     DM (diabetes mellitus), type 2     H/O pulmonary hypertension     History of left bundle branch block (LBBB)     Hyperlipidemia     Hypertension

## 2023-04-10 NOTE — PHYSICAL THERAPY INITIAL EVALUATION ADULT - MODALITIES TREATMENT COMMENTS
Pt. received on 3N in bed supine pleasant and cooperative with PT evaluation. Coordinated care with NSG, saw Pt. bedside. HM, Pulse Ox, Non invasive Vent mask on. Pt. conversive and alert to situation. Pt. tolerating thera Ex and bed side assessment activity well. O2 saturation >92% all throughout session. Deferred further functional mobility assessment d/t SOB and labored breathing during thera ex. Will assess when appropriate. Pt. received on 3N in bed supine pleasant and cooperative with PT evaluation. Coordinated care with NSG, saw Pt. bedside. HM, Pulse Ox, Non invasive Vent mask on. Pt. conversive and alert to situation. Pt. tolerating thera Ex and bed side assessment activity well. O2 saturation >92% all throughout session. Deferred further functional mobility assessment d/t SOB and labored breathing during thera ex. Will assess when appropriate. Pt. left NAD at end of session. NSG made aware.

## 2023-04-10 NOTE — PHYSICAL THERAPY INITIAL EVALUATION ADULT - PERTINENT HX OF CURRENT PROBLEM, REHAB EVAL
67 y/o F with PMH COPD (on 3L O2 PRN), Type 2 DM, pulmonary HTN, LBBB, HTN, HLD presented with low SpO2 at home. Pt states that she did not feel that her lower extremity swelling improved since being home. reports lower extremity swelling, abdominal swelling, facial swelling, increased thirst. Found to have Acute hypoxic and hypercapneic respiratory distress likely secondary to acute CHF exacerbation.

## 2023-04-10 NOTE — CONSULT NOTE ADULT - SUBJECTIVE AND OBJECTIVE BOX
CHIEF COMPLAINT: Patient is a 68y old  Female who presents with a chief complaint of Low SpO2 (10 Apr 2023 17:26)      HPI:  69 y/o F with PMH COPD (on 3L O2 PRN), Type 2 DM, pulmonary HTN, LBBB, HTN, HLD presented with low SpO2 at home. Pt states that she did not feel that her lower extremity swelling improved sinc being home. She was in bed all day florentin and did not wake up until later in the day. She took her SpO2 which was 53% on room air. Reports lower extremity swelling, abdominal swelling, facial swelling, increased thirst. Does not feel that she snores or is fatigued during the day time. Has not had a bowel movement in a few days. Denies fevers, chills, chest pain, N/V, diarrhea.    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 Labs: neutrophils 82%, K+ 5.5, glucose 148, AST 67, BNP 8970. VBG: pH 7.26, CO2 81, HCO3 36 -> ABG: pH 7.35, CO2 61, HCO3 24. COVID, influenza A/B, RSV negative. EKG: NSR with HR 86 bpm, PVCs,  ms (personally reviewed). CTA: No pulmonary embolus. Small right and trace left pleural effusions with adjacent compressive atelectasis. Small volume upper abdominal ascites. Cholithiasis.     Pt was given 250 ml of NS, lasix 40 mg IVP, Ipatropium nebulizer, magnesium sulfate, 125 mg IVP Solumedrol. She is being placed on telemetry observation for further management.  (10 Apr 2023 02:51)    Cardiology consulted to evaluate for HFpEF, severe pHTN plan for RHC. Pt; was admitted 3/9-3/13/23 for HFpEF exacerb, now p/w with decreased O2 Sat at home likely due to HFpEF, COPD and severe pHTN,   at present on Venti Mask. Pt's outpatient cardiologist is Bhupendra Joiner in [place].       PAST MEDICAL / SURGICAL HISTORY:  PAST MEDICAL & SURGICAL HISTORY:  COPD, severe      H/O pulmonary hypertension      Hypertension      History of left bundle branch block (LBBB)      DM (diabetes mellitus), type 2      Hyperlipidemia      S/P           SOCIAL HISTORY:   Alcohol: Denied  Smoking: Nonsmoker  Drug Use: Denied  Marital Status:     FAMILY HISTORY: FAMILY HISTORY:  FH: heart disease    FH: COPD (chronic obstructive pulmonary disease) (Father, Mother)    FH: CHF (congestive heart failure) (Mother)    FH: lung cancer (Father)        MEDICATIONS  (STANDING):  albuterol    90 MICROgram(s) HFA Inhaler 2 Puff(s) Inhalation every 4 hours  aspirin enteric coated 81 milliGRAM(s) Oral daily  atorvastatin 20 milliGRAM(s) Oral at bedtime  budesonide 160 MICROgram(s)/formoterol 4.5 MICROgram(s) Inhaler 2 Puff(s) Inhalation two times a day  cholecalciferol 1000 Unit(s) Oral daily  cyanocobalamin 1000 MICROGram(s) Oral daily  dextrose 5%. 1000 milliLiter(s) (50 mL/Hr) IV Continuous <Continuous>  dextrose 5%. 1000 milliLiter(s) (100 mL/Hr) IV Continuous <Continuous>  dextrose 50% Injectable 25 Gram(s) IV Push once  dextrose 50% Injectable 12.5 Gram(s) IV Push once  dextrose 50% Injectable 25 Gram(s) IV Push once  furosemide   Injectable 40 milliGRAM(s) IV Push two times a day  glucagon  Injectable 1 milliGRAM(s) IntraMuscular once  heparin   Injectable 5000 Unit(s) SubCutaneous every 12 hours  hydrochlorothiazide 12.5 milliGRAM(s) Oral daily  insulin glargine Injectable (LANTUS) 20 Unit(s) SubCutaneous at bedtime  insulin lispro (ADMELOG) corrective regimen sliding scale   SubCutaneous three times a day before meals  insulin lispro (ADMELOG) corrective regimen sliding scale   SubCutaneous at bedtime  insulin lispro Injectable (ADMELOG) 6 Unit(s) SubCutaneous three times a day before meals  losartan 100 milliGRAM(s) Oral daily  magnesium oxide 400 milliGRAM(s) Oral daily  methylPREDNISolone sodium succinate Injectable 40 milliGRAM(s) IV Push every 6 hours  metoprolol succinate ER 50 milliGRAM(s) Oral daily  polyethylene glycol 3350 17 Gram(s) Oral daily  senna 2 Tablet(s) Oral at bedtime  tiotropium 2.5 MICROgram(s) Inhaler 2 Puff(s) Inhalation daily    MEDICATIONS  (PRN):  acetaminophen     Tablet .. 650 milliGRAM(s) Oral every 6 hours PRN Temp greater or equal to 38C (100.4F), Mild Pain (1 - 3)  aluminum hydroxide/magnesium hydroxide/simethicone Suspension 30 milliLiter(s) Oral every 4 hours PRN Dyspepsia  dextrose Oral Gel 15 Gram(s) Oral once PRN Blood Glucose LESS THAN 70 milliGRAM(s)/deciliter  melatonin 3 milliGRAM(s) Oral at bedtime PRN Insomnia  ondansetron Injectable 4 milliGRAM(s) IV Push every 8 hours PRN Nausea and/or Vomiting      Allergies    latex (Unknown)  No Known Drug Allergies    Intolerances        REVIEW OF SYSTEMS:  CONSTITUTIONAL: No weakness, fevers or chills  Eyes: No visual changes  NECK: No pain or stiffness  RESPIRATORY: No cough, wheezing, hemoptysis; No shortness of breath  CARDIOVASCULAR: No chest pain or palpitations  GASTROINTESTINAL: No abdominal pain. No nausea, vomiting, or hematemesis; No diarrhea or constipation. No melena or hematochezia.  GENITOURINARY: No dysuria, frequency or hematuria  NEUROLOGICAL: No numbness.  SKIN: No itching or rash  All other review of systems is negative unless indicated above    VITAL SIGNS:   Vital Signs Last 24 Hrs  T(C): 37 (10 Apr 2023 15:57), Max: 37.4 (10 Apr 2023 08:07)  T(F): 98.6 (10 Apr 2023 15:57), Max: 99.3 (10 Apr 2023 08:07)  HR: 81 (10 Apr 2023 15:57) (80 - 104)  BP: 109/50 (10 Apr 2023 15:57) (106/81 - 150/76)  BP(mean): 93 (2023 18:30) (87 - 109)  RR: 18 (10 Apr 2023 15:57) (18 - 24)  SpO2: 95% (10 Apr 2023 15:57) (91% - 100%)    Parameters below as of 10 Apr 2023 15:57  Patient On (Oxygen Delivery Method): mask, Venturi    O2 Concentration (%): 50    I&O's Summary    2023 07:01  -  10 Apr 2023 07:00  --------------------------------------------------------  IN: 0 mL / OUT: 500 mL / NET: -500 mL    10 Apr 2023 07:01  -  10 Apr 2023 17:35  --------------------------------------------------------  IN: 350 mL / OUT: 400 mL / NET: -50 mL        PHYSICAL EXAM:    General: Awake and alert, cooperative with exam. No acute distress.   Skin: Warm, dry, and pink.   Eyes: Pupils equal and reactive to light. Extraocular eye movements intact. No conjunctival injection, discharge, or scleral icterus.   HEENT: Atraumatic, normocephalic. Moist mucus membranes.   Cardiology: Normal S1, S2. No murmurs, rubs, or gallops. Regular rate and rhythm.   Respiratory: Diminished breath sounds bilaterally. Crackles at the bases. Normal chest expansion.   Gastrointestinal: Positive bowel sounds. Soft, non-tender, non-distended. No guarding, rigidity, or rebound tenderness. No hepatosplenomegaly.   Musculoskeletal: 5/5 motor strength in all extremities. Normal range of motion.   Extremities: 2+ peripheral edema bilaterally. Dorsalis pedis pulses 2+ bilaterally.   Neurological: A+Ox3 (person, place, and time). Cranial nerves 2-12 intact. Normal speech. No facial droop. No focal neurological deficits.    Psychiatric: Normal affect. Normal mood.      LABS:                        12.2   4.07  )-----------( 299      ( 10 Apr 2023 08:24 )             42.6                         13.0   4.76  )-----------( 332      ( 2023 17:42 )             46.2     10 Apr 2023 08:24    136    |  102    |  65     ----------------------------<  178    5.1     |  30     |  1.21   10 Apr 2023 04:00    x      |  x      |  x      ----------------------------<  180    x       |  x      |  x      2023 17:42    139    |  102    |  59     ----------------------------<  148    5.5     |  34     |  1.18     Ca    9.2        10 Apr 2023 08:24  Ca    9.4        2023 17:42  Mg     2.7       10 Apr 2023 08:24  Mg     2.5       2023 17:42    TPro  7.1    /  Alb  3.4    /  TBili  0.6    /  DBili  x      /  AST  51     /  ALT  40     /  AlkPhos  100    10 2023 08:24  TPro  7.6    /  Alb  3.7    /  TBili  0.9    /  DBili  x      /  AST  67     /  ALT  40     /  AlkPhos  115    2023 17:42    Radiology/Echocardiogram    < from: Xray Abdomen 2 Views (04.10.23 @ 03:43) >  IMPRESSION:  1. Technically limited study due to both motion and patient's body   habitus.  2. Atelectasis in the right middle and lower lobe of the right lung is   present.  3. Nonspecific mildly dilated centrally located small bowel loops and   partial aeration of the proximal left colon.  4. Chronic degenerative changes of the lumbar spine and right greater   than left hips. 5. AVN of the right hip cannot be excluded from the study.    < end of copied text >          -------------------------------------------------------------------------------------------  < from: TTE Echo Complete w/ Contrast w/ Doppler (03.10.23 @ 10:02) >     Impression     Summary     Endocardium is not well visualized, however, overall left ventricular   systolic function appears normal. Technically Difficult Study.   Septal flattening is seen; this finding is consistent with right heart   pressure / volume overload.   Estimated left ventricular ejection fraction is 55-60 %.   Normal appearing left atrium.   The right atrium appears moderately dilated.   The right ventricle is severely dilated.   The right ventricular apex appears hypokinetic.   The aortic valve is trileaflet with thin pliable leaflets.   Moderate mitral annular calcification is present.   There is calcification of mitral valve leaflets. The leaflet opening is   normal.   EA reversal of the mitral inflow consistent with reduced compliance of   the   left ventricle.   Trace mitral regurgitation is present.   The tricuspid valve leaflets are thin and pliable; valve motion is   normal.   Moderate (2+) tricuspid valve regurgitation is present.   Severe pulmonary hypertension.   Pulmonic valve not well seen.   No evidence of pericardial effusion.   No evidence of pleural effusion.   IVC is dilated and not collapsing with inspiration.     Signature     ----------------------------------------------------------------   Electronically signed by Lyssa BATESInterpreting   physician) on 03/10/2023 10:44 AM   ----------------------------------------------------------------    < end of copied text >

## 2023-04-10 NOTE — H&P ADULT - NSHPREVIEWOFSYSTEMS_GEN_ALL_CORE
Constitutional: positive for fatigue, negative for fever, negative for chills, negative for decreased appetite.  Skin: negative for rashes, negative for open wounds, negative for jaundice.   Eyes: negative for blurry vision, negative for double vision.   Ears, nose, throat: negative for ear pain, negative for nasal congestion, negative for sore throat, negative for lymph node swelling.   Cardiovascular: negative for chest pain, negative for palpitations, positive for lower extremity swelling.   Respiratory: negative for shortness of breath, negative for wheezing, negative for cough.   Gastrointestinal: negative for abdominal pain, negative for nausea, negative for vomiting, negative for diarrhea, negative for constipation, negative for blood in the stool, negative for black tarry stools, positive for abd swelling   Genitourinary: negative for burning on urination, negative for urinary urgency or frequency, negative for blood in the urine.   Endocrine: negative for cold intolerance, negative for heat intolerance, negative for increased thirst.   Hematologic: negative for easy bruising or bleeding.   Musculoskeletal: negative for muscle/joint pain, negative for decreased range of motion.   Neurological: negative for dizziness, negative for headaches, negative for loss of consciousness, negative for motor weakness, negative for sensory deficits.   Psychiatric: negative for depression, negative for anxiety.

## 2023-04-10 NOTE — CONSULT NOTE ADULT - PROBLEM SELECTOR RECOMMENDATION 2
pt. with severe pHTN on previous Echo from 3/3023  Recommendation: plan for RHC this admission pt. with severe pHTN on previous Echo from 3/3023  Recommendation: RHC this admission after fluid overload improves pt. with severe pHTN on previous Echo from 3/3023  [facility] this admission after fluid overload improves

## 2023-04-10 NOTE — H&P ADULT - NSHPOUTPATIENTPROVIDERS_GEN_ALL_CORE
PCP - Dr. Mary   Cardiologist - Dr. Kumar Joiner ([facility]   Pulmonologist - Dr. Mauricio   Endocrinologist - Dr. Agudelo

## 2023-04-10 NOTE — PHYSICAL THERAPY INITIAL EVALUATION ADULT - RANGE OF MOTION EXAMINATION, REHAB EVAL
R hip noted to be in external rotation. According to the Pt. chronic in nature./bilateral upper extremity ROM was WFL (within functional limits)/bilateral lower extremity ROM was WFL (within functional limits)

## 2023-04-10 NOTE — CONSULT NOTE ADULT - SUBJECTIVE AND OBJECTIVE BOX
HPI: JOHANA MCDERMOTT is a 68y old Female with PMH     69 y/o F with PMH COPD (on 3L O2 PRN), Type 2 DM, pulmonary HTN, LBBB, HTN, HLD presented with low SpO2 at home. Pt states that she did not feel that her lower extremity swelling improved sinc being home. She was in bed all day florentin and did not wake up until later in the day. She took her SpO2 which was 53% on room air. Reports lower extremity swelling, abdominal swelling, facial swelling, increased thirst. Does not feel that she snores or is fatigued during the day time. Has not had a bowel movement in a few days. Denies fevers, chills, chest pain, N/V, diarrhea.    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 Labs: neutrophils 82%, K+ 5.5, glucose 148, AST 67, BNP 8970. VBG: pH 7.26, CO2 81, HCO3 36 -> ABG: pH 7.35, CO2 61, HCO3 24. COVID, influenza A/B, RSV negative. EKG: NSR with HR 86 bpm, PVCs,  ms (personally reviewed). CTA: No pulmonary embolus. Small right and trace left pleural effusions with adjacent compressive atelectasis. Small volume upper abdominal ascites. Cholithiasis.     Pt was given 250 ml of NS, lasix 40 mg IVP, Ipatropium nebulizer, magnesium sulfate, 125 mg IVP Solumedrol. She is being placed on telemetry observation for further management.         Past Medical History:   COPD, severe    H/O pulmonary hypertension    Hypertension    History of left bundle branch block (LBBB)    DM (diabetes mellitus), type 2    Hyperlipidemia      Past Surgical History:   S/P       Family History:    FH: heart disease    FH: COPD (chronic obstructive pulmonary disease) (Father, Mother)    FH: CHF (congestive heart failure) (Mother)    FH: lung cancer (Father)       Social History:     Review of Systems:  All 10 systems reviewed in detailed and found to be negative with the exception of what has already been described above    Allergies:  latex (Unknown)  No Known Drug Allergies    Meds  MEDICATIONS  (STANDING):  albuterol    90 MICROgram(s) HFA Inhaler 2 Puff(s) Inhalation every 4 hours  aspirin enteric coated 81 milliGRAM(s) Oral daily  atorvastatin 20 milliGRAM(s) Oral at bedtime  budesonide 160 MICROgram(s)/formoterol 4.5 MICROgram(s) Inhaler 2 Puff(s) Inhalation two times a day  cholecalciferol 1000 Unit(s) Oral daily  cyanocobalamin 1000 MICROGram(s) Oral daily  dextrose 5%. 1000 milliLiter(s) (50 mL/Hr) IV Continuous <Continuous>  dextrose 5%. 1000 milliLiter(s) (100 mL/Hr) IV Continuous <Continuous>  dextrose 50% Injectable 25 Gram(s) IV Push once  dextrose 50% Injectable 12.5 Gram(s) IV Push once  dextrose 50% Injectable 25 Gram(s) IV Push once  furosemide   Injectable 40 milliGRAM(s) IV Push two times a day  glucagon  Injectable 1 milliGRAM(s) IntraMuscular once  heparin   Injectable 5000 Unit(s) SubCutaneous every 12 hours  hydrochlorothiazide 12.5 milliGRAM(s) Oral daily  insulin glargine Injectable (LANTUS) 20 Unit(s) SubCutaneous at bedtime  insulin lispro (ADMELOG) corrective regimen sliding scale   SubCutaneous three times a day before meals  insulin lispro (ADMELOG) corrective regimen sliding scale   SubCutaneous at bedtime  insulin lispro Injectable (ADMELOG) 6 Unit(s) SubCutaneous three times a day before meals  losartan 100 milliGRAM(s) Oral daily  magnesium oxide 400 milliGRAM(s) Oral daily  methylPREDNISolone sodium succinate Injectable 40 milliGRAM(s) IV Push every 6 hours  metoprolol succinate ER 50 milliGRAM(s) Oral daily  polyethylene glycol 3350 17 Gram(s) Oral daily  senna 2 Tablet(s) Oral at bedtime  tiotropium 2.5 MICROgram(s) Inhaler 2 Puff(s) Inhalation daily    MEDICATIONS  (PRN):  acetaminophen     Tablet .. 650 milliGRAM(s) Oral every 6 hours PRN Temp greater or equal to 38C (100.4F), Mild Pain (1 - 3)  aluminum hydroxide/magnesium hydroxide/simethicone Suspension 30 milliLiter(s) Oral every 4 hours PRN Dyspepsia  dextrose Oral Gel 15 Gram(s) Oral once PRN Blood Glucose LESS THAN 70 milliGRAM(s)/deciliter  melatonin 3 milliGRAM(s) Oral at bedtime PRN Insomnia  ondansetron Injectable 4 milliGRAM(s) IV Push every 8 hours PRN Nausea and/or Vomiting    Physical Exam  T(C): 36.7 (04-10-23 @ 11:19), Max: 37.4 (04-10-23 @ 08:07)  HR: 93 (04-10-23 @ 11:07) (82 - 104)  BP: 107/57 (04-10-23 @ 11:07) (106/81 - 150/76)  RR: 18 (04-10-23 @ 08:07) (18 - 24)  SpO2: 93% (04-10-23 @ 08:07) (91% - 100%)  Gen: Alert, oriented, no distress  HEENT: Anicteric sclera, moist mucous membranes, no JVD, no lymphadenopathy, good dentition  Cardio: Regular rhythm and rate, normal S1S2, no murmurs  Resp: Clear to auscultation bilaterally, no wheezing or rhonchi  GI: Nontender, nondistended, normoactive bowel sounds  Ext: No cyanosis, clubbing or edema  Neuro: Nonfocal    Labs:                        12.2   4.07  )-----------( 299      ( 10 Apr 2023 08:24 )             42.6     04-10    136  |  102  |  65<H>  ----------------------------<  178<H>  5.1   |  30  |  1.21    Ca    9.2      10 Apr 2023 08:24  Mg     2.7     04-10    TPro  7.1  /  Alb  3.4  /  TBili  0.6  /  DBili  x   /  AST  51<H>  /  ALT  40  /  AlkPhos  100  04-10    < from: CT Angio Chest PE Protocol w/ IV Cont (23 @ 21:49) >  ACC: [id number] EXAM:  CT ANGIO CHEST PULM ART [facility]   ORDERED BY: RON MARCELINO     PROCEDURE DATE:  2023          INTERPRETATION:  CLINICAL INFORMATION: Hypoxia and shortness of breath    COMPARISON: CT chest 2016.    CONTRAST/COMPLICATIONS:  IV Contrast: Omnipaque 350  80 cc administered   20 cc discarded  Oral Contrast: NONE  Complications: None reported at time of study completion    PROCEDURE:  CT Angiography of the Chest.  Sagittal and coronal reformats were performed as wellas 3D (MIP)   reconstructions.    FINDINGS:    LUNGS AND AIRWAYS: Patent central airways. Compressive atelectasis in the   right middle and bilateral lower lobes adjacent to pleural effusions.   Linear type scarring in the right upper lobe. PLEURA: Small right and   trace left pleural effusions.  MEDIASTINUM AND MARTINEZ: No lymphadenopathy.  VESSELS: No pulmonary embolus. Atherosclerotic calcifications of the   aorta and coronary arteries..  HEART: Mild cardiomegaly. No pericardial effusion.  CHEST WALL AND LOWER NECK: Within normal limits.  VISUALIZED UPPER ABDOMEN: Small volume upper abdominal ascites, grossly   unchanged when compared to prior study. Cholelithiasis.  BONES: Degenerative changes of the spine.    IMPRESSION:  No pulmonary embolus.    Small right and trace left pleural effusions with adjacent compressive   atelectasis.    Small volume upper abdominal ascites.    < end of copied text >   HPI: JOHANA MCDERMOTT is a 68y old Female with PMH COPD (on 3L O2 PRN), Type 2 DM, pulmonary HTN, LBBB, HTN, HLD who presented with low SpO2 at measured home. Pt states that she did not feel that her lower extremity swelling improved since being home. She had a recent admission for acute CHF and COPD exacerbation requiring bipap earlier this month.  She took her SpO2 which was 53% on room air. She reports lower extremity swelling, abdominal swelling, facial swelling, increased thirst. She has not had a bowel movement in a few days. She denies fevers, chills, chest pain, In the ER SpO2 53% which improved to % on Bipap.   She was given 250 ml of NS, lasix 40 mg IVP, Ipatropium nebulizer, magnesium sulfate, 125 mg IVP Solumedrol.  She was seen later in the day, on venti mask. she feels like her breathing has improved    Past Medical History:   COPD, severe    H/O pulmonary hypertension    Hypertension    History of left bundle branch block (LBBB)    DM (diabetes mellitus), type 2    Hyperlipidemia      Past Surgical History:   S/P       Family History:    FH: heart disease    FH: COPD (chronic obstructive pulmonary disease) (Father, Mother)    FH: CHF (congestive heart failure) (Mother)    FH: lung cancer (Father)       Social History: lives at home    Review of Systems:  All 10 systems reviewed in detailed and found to be negative with the exception of what has already been described above    Allergies:  latex (Unknown)  No Known Drug Allergies    Meds  MEDICATIONS  (STANDING):  albuterol    90 MICROgram(s) HFA Inhaler 2 Puff(s) Inhalation every 4 hours  aspirin enteric coated 81 milliGRAM(s) Oral daily  atorvastatin 20 milliGRAM(s) Oral at bedtime  budesonide 160 MICROgram(s)/formoterol 4.5 MICROgram(s) Inhaler 2 Puff(s) Inhalation two times a day  cholecalciferol 1000 Unit(s) Oral daily  cyanocobalamin 1000 MICROGram(s) Oral daily  dextrose 5%. 1000 milliLiter(s) (50 mL/Hr) IV Continuous <Continuous>  dextrose 5%. 1000 milliLiter(s) (100 mL/Hr) IV Continuous <Continuous>  dextrose 50% Injectable 25 Gram(s) IV Push once  dextrose 50% Injectable 12.5 Gram(s) IV Push once  dextrose 50% Injectable 25 Gram(s) IV Push once  furosemide   Injectable 40 milliGRAM(s) IV Push two times a day  glucagon  Injectable 1 milliGRAM(s) IntraMuscular once  heparin   Injectable 5000 Unit(s) SubCutaneous every 12 hours  hydrochlorothiazide 12.5 milliGRAM(s) Oral daily  insulin glargine Injectable (LANTUS) 20 Unit(s) SubCutaneous at bedtime  insulin lispro (ADMELOG) corrective regimen sliding scale   SubCutaneous three times a day before meals  insulin lispro (ADMELOG) corrective regimen sliding scale   SubCutaneous at bedtime  insulin lispro Injectable (ADMELOG) 6 Unit(s) SubCutaneous three times a day before meals  losartan 100 milliGRAM(s) Oral daily  magnesium oxide 400 milliGRAM(s) Oral daily  methylPREDNISolone sodium succinate Injectable 40 milliGRAM(s) IV Push every 6 hours  metoprolol succinate ER 50 milliGRAM(s) Oral daily  polyethylene glycol 3350 17 Gram(s) Oral daily  senna 2 Tablet(s) Oral at bedtime  tiotropium 2.5 MICROgram(s) Inhaler 2 Puff(s) Inhalation daily    MEDICATIONS  (PRN):  acetaminophen     Tablet .. 650 milliGRAM(s) Oral every 6 hours PRN Temp greater or equal to 38C (100.4F), Mild Pain (1 - 3)  aluminum hydroxide/magnesium hydroxide/simethicone Suspension 30 milliLiter(s) Oral every 4 hours PRN Dyspepsia  dextrose Oral Gel 15 Gram(s) Oral once PRN Blood Glucose LESS THAN 70 milliGRAM(s)/deciliter  melatonin 3 milliGRAM(s) Oral at bedtime PRN Insomnia  ondansetron Injectable 4 milliGRAM(s) IV Push every 8 hours PRN Nausea and/or Vomiting    Physical Exam  T(C): 36.7 (04-10-23 @ 11:19), Max: 37.4 (04-10-23 @ 08:07)  HR: 93 (04-10-23 @ 11:07) (82 - 104)  BP: 107/57 (04-10-23 @ 11:07) (106/81 - 150/76)  RR: 18 (04-10-23 @ 08:07) (18 - 24)  SpO2: 93% (04-10-23 @ 08:07) (91% - 100%)  Gen: Alert, oriented, mild distress on venti mask  HEENT: Anicteric sclera, moist mucous membranes  Cardio: Regular rhythm and rate, normal S1S2, no murmurs  Resp: decrease breath sounds poor air movement  GI: Nontender, nondistended, normoactive bowel sounds  Ext: 2+ edema bilaterally  Neuro: Nonfocal    Labs:                        12.2   4.07  )-----------( 299      ( 10 Apr 2023 08:24 )             42.6     04-10    136  |  102  |  65<H>  ----------------------------<  178<H>  5.1   |  30  |  1.21    Ca    9.2      10 Apr 2023 08:24  Mg     2.7     04-10    TPro  7.1  /  Alb  3.4  /  TBili  0.6  /  DBili  x   /  AST  51<H>  /  ALT  40  /  AlkPhos  100  04-10    < from: CT Angio Chest PE Protocol w/ IV Cont (23 @ 21:49) >  ACC: [id number] EXAM:  CT ANGIO CHEST PULM ART [facility]   ORDERED BY: RON MARCELINO     PROCEDURE DATE:  2023          INTERPRETATION:  CLINICAL INFORMATION: Hypoxia and shortness of breath    COMPARISON: CT chest 2016.    CONTRAST/COMPLICATIONS:  IV Contrast: Omnipaque 350  80 cc administered   20 cc discarded  Oral Contrast: NONE  Complications: None reported at time of study completion    PROCEDURE:  CT Angiography of the Chest.  Sagittal and coronal reformats were performed as wellas 3D (MIP)   reconstructions.    FINDINGS:    LUNGS AND AIRWAYS: Patent central airways. Compressive atelectasis in the   right middle and bilateral lower lobes adjacent to pleural effusions.   Linear type scarring in the right upper lobe. PLEURA: Small right and   trace left pleural effusions.  MEDIASTINUM AND MARTINEZ: No lymphadenopathy.  VESSELS: No pulmonary embolus. Atherosclerotic calcifications of the   aorta and coronary arteries..  HEART: Mild cardiomegaly. No pericardial effusion.  CHEST WALL AND LOWER NECK: Within normal limits.  VISUALIZED UPPER ABDOMEN: Small volume upper abdominal ascites, grossly   unchanged when compared to prior study. Cholelithiasis.  BONES: Degenerative changes of the spine.    IMPRESSION:  No pulmonary embolus.    Small right and trace left pleural effusions with adjacent compressive   atelectasis.    Small volume upper abdominal ascites.    TTE Echo Complete w/ Contrast w/ Doppler (03.10.23 @ 10:02) >   Impression     Summary     Endocardium is not well visualized, however, overall left ventricular   systolic function appears normal. Technically Difficult Study.   Septal flattening is seen; this finding is consistent with right heart   pressure / volume overload.   Estimated left ventricular ejection fraction is 55-60 %.   Normal appearing left atrium.   The right atrium appears moderately dilated.   The right ventricle is severely dilated.   The right ventricular apex appears hypokinetic.   The aortic valve is trileaflet with thin pliable leaflets.   Moderate mitral annular calcification is present.   There is calcification of mitral valve leaflets. The leaflet opening is   normal.   EA reversal of the mitral inflow consistent with reduced compliance of   the   left ventricle.   Trace mitral regurgitation is present.   The tricuspid valve leaflets are thin and pliable; valve motion is   normal.   Moderate (2+) tricuspid valve regurgitation is present.   Severe pulmonary hypertension.   Pulmonic valve not well seen.   No evidence of pericardial effusion.   No evidence of pleural effusion.   IVC is dilated and not collapsing with inspiration.

## 2023-04-10 NOTE — CONSULT NOTE ADULT - PROBLEM SELECTOR RECOMMENDATION 9
pt; with known HFpEF p/w  decreased O2 Sat at home, likely multifactorial  due to acute on chronic HFpEF exacerbation, severe pHTN,, COPD exacerbation and CARRINGTON/obesity; elevated BNP 8970 - similar to previous admission in March 2023.  Echo from 3/23 shows preserved LVEF 55-60%, with severely dilated RV, severe pHTN and moderate TR   Recommendation: start lasix 40 mg ivp bid , continue home regimen, daily weight, strict I&Os, fluid restriction 1.5L/day. Will plan for RHC pt; with known HFpEF p/w decreased O2 Sat at home, likely multifactorial  due to acute on chronic HFpEF exacerbation, severe pHTN,, COPD exacerbation and CARRINGTON/obesity; elevated BNP 8970 - similar to previous admission in March 2023.  Echo from 3/23 shows preserved LVEF 55-60%, with severely dilated RV, severe pHTN and moderate TR   Recommendation:  pt./ is fluid overloaded, will start lasix 40 mg ivp bid , continue home regimen, daily weight, strict I&Os, fluid restriction 1.5L/day. Will plan for RHC pt; with known HFpEF p/w decreased O2 Sat at home, likely multifactorial  due to acute on chronic HFpEF exacerbation, severe pHTN,, COPD exacerbation and CARRINGTON/obesity; elevated BNP 8970 - similar to previous admission in March 2023.  Echo from 3/23 shows preserved LVEF 55-60%, with severely dilated RV, severe pHTN and moderate TR   Recommendation:  pt./ is fluid overloaded, will start lasix 40 mg ivp bid , continue home regimen, daily weight, strict I&Os, fluid restriction 1.5L/day. Will plan for RHC after luid overload improves pt; with known HFpEF p/w decreased O2 Sat at home, likely multifactorial  due to acute on chronic HFpEF exacerbation, severe pHTN,, COPD exacerbation and CARRINGTON/obesity; elevated BNP 8970 - similar to previous admission in March 2023.  Echo from 3/23 shows preserved LVEF 55-60%, with severely dilated RV, severe pHTN and moderate TR     Recommendation:  pt./ is fluid overloaded, will start lasix 40 mg iv, discussed w/ hospitalist increase to TID, continue home regimen, daily weight, strict I&Os, fluid restriction 1.5L/day.     Once more euvolemic will plan for RHC confirm she is adequately diuresed - was admitted 1 month ago w/ similar fluid overload presentation.  Discussed w/ Dr. Jolly - RHC for evaluation of pulmonary pressures & vasodilator testing may also be helpful.

## 2023-04-10 NOTE — PHYSICAL THERAPY INITIAL EVALUATION ADULT - ADDITIONAL COMMENTS
Pt. lived in a single level home with 4 JASON B HR support and ramp in the back. Pt. lived alone and was independent in all aspects of mobility with use of a 4WW. Pt. utilized delivery services as needed. Pt. was still driving.

## 2023-04-10 NOTE — H&P ADULT - NSHPPHYSICALEXAM_GEN_ALL_CORE
ICU Vital Signs Last 24 Hrs  T(C): 36.6 (10 Apr 2023 00:00), Max: 37.1 (09 Apr 2023 18:30)  T(F): 97.9 (10 Apr 2023 00:00), Max: 98.7 (09 Apr 2023 18:30)  HR: 90 (10 Apr 2023 00:00) (82 - 90)  BP: 132/71 (10 Apr 2023 00:00) (106/81 - 150/76)  BP(mean): 93 (09 Apr 2023 18:30) (87 - 109)  RR: 18 (10 Apr 2023 00:00) (18 - 24)  SpO2: 93% (10 Apr 2023 00:00) (93% - 100%)    O2 Parameters below as of 10 Apr 2023 00:00  Patient On (Oxygen Delivery Method): nasal cannula  O2 Flow (L/min): 3    General: Awake and alert, cooperative with exam. No acute distress.   Skin: Warm, dry, and pink.   Eyes: Pupils equal and reactive to light. Extraocular eye movements intact. No conjunctival injection, discharge, or scleral icterus.   HEENT: Atraumatic, normocephalic. Moist mucus membranes.   Cardiology: Normal S1, S2. No murmurs, rubs, or gallops. Regular rate and rhythm.   Respiratory: Diminished breath sounds bilaterally. Crackles at the bases. Normal chest expansion.   Gastrointestinal: Positive bowel sounds. Soft, non-tender, non-distended. No guarding, rigidity, or rebound tenderness. No hepatosplenomegaly.   Musculoskeletal: 5/5 motor strength in all extremities. Normal range of motion.   Extremities: 2+ peripheral edema bilaterally. Dorsalis pedis pulses 2+ bilaterally.   Neurological: A+Ox3 (person, place, and time). Cranial nerves 2-12 intact. Normal speech. No facial droop. No focal neurological deficits.    Psychiatric: Normal affect. Normal mood.

## 2023-04-10 NOTE — CONSULT NOTE ADULT - ASSESSMENT
rol    90 MICROgram(s) HFA Inhaler 2 Puff(s) Inhalation every 4 hours  1. Acute on chronic diastolic congestive heart failure.  2. Very severe COPD / acute on chronic hypercarbic respiratory failure with hypoxia.  3. Pulmonary HTN. Group 2 and group 3 etiology. Optimize treatment for chronic cardiac and pulmonary disease. Continue supplemental O2.     4. ? CARRINGTON / obesity hypoventilation. Consider outpatient eval for CARRINGTON if patient agrees.       aspirin enteric coated 81 milliGRAM(s) Oral daily  atorvastatin 20 milliGRAM(s) Oral at bedtime  budesonide 160 MICROgram(s)/formoterol 4.5 MICROgram(s) Inhaler 2 Puff(s) Inhalation two times a day  cholecalciferol 1000 Unit(s) Oral daily  cyanocobalamin 1000 MICROGram(s) Oral daily  dextrose 5%. 1000 milliLiter(s) (50 mL/Hr) IV Continuous <Continuous>  dextrose 5%. 1000 milliLiter(s) (100 mL/Hr) IV Continuous <Continuous>  dextrose 50% Injectable 25 Gram(s) IV Push once  dextrose 50% Injectable 12.5 Gram(s) IV Push once  dextrose 50% Injectable 25 Gram(s) IV Push once  furosemide   Injectable 40 milliGRAM(s) IV Push two times a day  glucagon  Injectable 1 milliGRAM(s) IntraMuscular once  heparin   Injectable 5000 Unit(s) SubCutaneous every 12 hours  hydrochlorothiazide 12.5 milliGRAM(s) Oral daily  insulin glargine Injectable (LANTUS) 20 Unit(s) SubCutaneous at bedtime  insulin lispro (ADMELOG) corrective regimen sliding scale   SubCutaneous three times a day before meals  insulin lispro (ADMELOG) corrective regimen sliding scale   SubCutaneous at bedtime  insulin lispro Injectable (ADMELOG) 6 Unit(s) SubCutaneous three times a day before meals  losartan 100 milliGRAM(s) Oral daily  magnesium oxide 400 milliGRAM(s) Oral daily  methylPREDNISolone sodium succinate Injectable 40 milliGRAM(s) IV Push every 6 hours  metoprolol succinate ER 50 milliGRAM(s) Oral daily  polyethylene glycol 3350 17 Gram(s) Oral daily  senna 2 Tablet(s) Oral at bedtime  tiotropium 2.5 MICROgram(s) Inhaler 2 Puff(s) Inhalation daily    MEDICATIONS  (PRN):  acetaminophen     Tablet .. 650 milliGRAM(s) Oral every 6 hours PRN Temp greater or equal to 38C (100.4F), Mild Pain (1 - 3)  aluminum hydroxide/magnesium hydroxide/simethicone Suspension 30 milliLiter(s) Oral every 4 hours PRN Dyspepsia  dextrose Oral Gel 15 Gram(s) Oral once PRN Blood Glucose LESS THAN 70 milliGRAM(s)/deciliter  melatonin 3 milliGRAM(s) Oral at bedtime PRN Insomnia  ondansetron Injectable 4 milliGRAM(s) IV Pu 68y old Female with PMH COPD (on 3L O2 PRN), Type 2 DM, pulmonary HTN, LBBB, HTN, HLD wit hypoxic resp failure from COPD exacerbation and acute CHF exacerbation  1. Acute on chronic diastolic congestive heart failure: continue lasix  -supplemental o2  -continue hydrochlorothiazine  2. Very severe COPD / acute on chronic hypercarbic respiratory failure with hypoxia: continue symbicort, spiriva  -continue albuterol PRN  -continue methylprednisolone  3. Pulmonary HTN. Group 2 and group 3 etiology. Optimize treatment for chronic cardiac and pulmonary disease. Continue supplemental O2.   4. ? CARRINGTON / obesity hypoventilation. Consider outpatient eval for CARRINGTON if patient agrees  5. pleural effusions: likely from volume overload. medical management. f/u cxr after diuresis    spoke to [place]

## 2023-04-10 NOTE — H&P ADULT - ASSESSMENT
69 y/o F presented with low SpO2 at home      1. Acute hypoxic and hypercapneic respiratory distress likely secondary to acute CHF exacerbation, COPD exacerbation, severe pulmonary HTN, possible CARRINGTON and obesity hypoventilation syndrome   - Telemetry observation   - SpO2 53% -> % on Bipap -> 93% on 3L O2   - c/w Bipap QHS and PRN   - BNP 8970, pt is overloaded on exam, CTA: b/l effusions, abd ascites   - ECHO (3/10/23): EF 55-60%, RA and RV dilation, moderate 2+ tricuspid regurgitation, severe pulmonary HTN   - s/p 40 mg IVP lasix in the ER, continue with 40 mg IVP BID for and taper down   - c/w home medications: beta blocker and ARB   - Strict I+Os, daily weights   - 2L H20 restriction, 2g sodium restriction      - Keep K > 4 and Mg > 2    - Albuterol Q4H standing   - c/w Spiriva and Symbicort   - s/p 125 mg of Solumedrol, c/w Solumedrol 40 mg Q6H and taper down   - May need to consider starting Revatio or Bosentan or consideration for right heart cath, need to discuss further with cardiology and pulmonology given severe pulmonary HTN   - Cardiology consult - Dr. Parker   - Pulmonology consult - Dr. Mauricio     2. Hyperkalemia   - K+ 5.5, will give 1 dose of lokelma   - f/u AM K+     3. Hyperglycemia secondary to Type 2 DM   - Glucose 148, monitor   - Ordered HbA1c   - Carb restriction on diet   - Will start 0.3 total daily units of insulin daily in insulin naive pt - Lantus 20 units QHS, Ademelog 6 units TIDAC, moderate dose ISS -> monitor sugars closely may need to increase given steroids for COPD    4. Elevated AST   - AST 67, trend     5. Constipation   - c/w bowel regimen   - Abd XR in AM     6. History of COPD (on nocturnal home O2), Type 2 DM, pulmonary HTN, LBBB, HTN, HLD   - c/w home medications; verified with pt at the bedside  - Cholithiasis -> f/u with PCP outpt   - Hold Nisoldipine given lower extremity swelling     DVT ppx: Heparin 5,000 units Q12H (hold for PLT < 50,000)   Code status: DNR/DNI (Completed MOLST form with the pt at the bedside. The patient is A+Ox3 at the time of my evaluation and has full capacity. Able to make an informed decision and understands risks associated with decisions made. Pt is DNR/DNI, no feeding tube. Agreeable to IVF, IV abx, trial of Bipap. MOLST completed and placed into chart).   Emergency contact: Randy Fiore (son [phone]) or Manish Fiore (son [phone])     I spent a total of 80 minutes on the date of this encounter coordinating the patient's care. This includes reviewing prior documentation, results and imaging in addition to completing a full history and physical examination on the patient. Further tests, medications, and procedures have been ordered as indicated. Laboratory results and the plan of care were communicated to the patient and/or their family member. Supporting documentation was completed and added to the patient's chart.

## 2023-04-10 NOTE — PROGRESS NOTE ADULT - ASSESSMENT
67 y/o F presented with low SpO2 at home      1. Acute hypoxic and hypercapneic respiratory distress likely secondary to acute CHF exacerbation, COPD exacerbation, severe pulmonary HTN, possible CARR[place] and obesity hypoventilation syndrome   - Telemetry observation   - SpO2 53% -> % on Bipap -> 93% on 50% venti mask   - c/w Bipap QHS and PRN   - BNP 8970, pt is overloaded on exam, CTA: b/l effusions, abd ascites   - ECHO (3/10/23): EF 55-60%, RA and RV dilation, moderate 2+ tricuspid regurgitation, severe pulmonary HTN   - s/p 40 mg IVP lasix in the ER, continue with 40 mg IVP BID for and taper down   - c/w home medications: beta blocker and ARB   - Strict I+Os, daily weights   - 2L H20 restriction, 2g sodium restriction      - Keep K > 4 and Mg > 2    - Albuterol Q4H standing   - c/w Spiriva and Symbicort   - s/p 125 mg of Solumedrol, c/w Solumedrol 40 mg Q6H and taper down   - May need to consider starting Revatio or Bosentan or consideration for right heart cath, need to discuss further with cardiology and pulmonology given severe pulmonary HTN   - Cardiology consult - Dr. Parker   - Pulmonology consult - Dr. Mauricio     2. Hyperkalemia   - K+ 5.5 -> 5.1, will give 1 dose of lokelma   - trend K+     3. Hyperglycemia secondary to Type 2 DM   - Glucose 148, monitor   - Ordered HbA1c   - Carb restriction on diet   - Will start 0.3 total daily units of insulin daily in insulin naive pt - Lantus 20 units QHS, Ademelog 6 units TIDAC, moderate dose ISS -> monitor sugars closely may need to increase given steroids for COPD    4. Elevated AST   - AST 67, trend     5. Constipation   - c/w bowel regimen   - Abd XR in AM     6. History of COPD (on nocturnal home O2), Type 2 DM, pulmonary HTN, LBBB, HTN, HLD   - c/w home medications; verified with pt at the bedside  - Cholithiasis -> f/u with PCP outpt   - Hold Nisoldipine given lower extremity swelling     DVT ppx: Heparin 5,000 units Q12H (hold for PLT < 50,000)     Code status: DNR/DNI (Completed MOLST form with the pt at the bedside. The patient is A+Ox3 at the time of my evaluation and has full capacity. Able to make an informed decision and understands risks associated with decisions made. Pt is DNR/DNI, no feeding tube. Agreeable to IVF, IV abx, trial of Bipap. MOLST completed and placed into chart).   Emergency contact: Randy Fiore (son [phone]) or Manish Fiore (son [phone])

## 2023-04-10 NOTE — PATIENT PROFILE ADULT - FALL HARM RISK - HARM RISK INTERVENTIONS

## 2023-04-10 NOTE — H&P ADULT - NSHPSOCIALHISTORY_GEN_ALL_CORE
Lives by herself. Her sons live close by. Smoked 1-2 packs a day for many years. Denies alcohol and drug use.

## 2023-04-10 NOTE — PHYSICAL THERAPY INITIAL EVALUATION ADULT - GAIT TRAINING, PT EVAL
Patient will ambulate 200ft with least restrictive assistive device and navigate up/down 4 steps with B HR support with Supervision by discharge

## 2023-04-10 NOTE — H&P ADULT - CONVERSATION DETAILS
DNR/DNI (Completed MOLST form with the pt at the bedside. The patient is A+Ox3 at the time of my evaluation and has full capacity. Able to make an informed decision and understands risks associated with decisions made. Pt is DNR/DNI, no feeding tube. Agreeable to IVF, IV abx, trial of Bipap. MOLST completed and placed into chart).

## 2023-04-10 NOTE — PHYSICAL THERAPY INITIAL EVALUATION ADULT - GENERAL OBSERVATIONS, REHAB EVAL
The Pt was pleasant and cooperative, received on 3N, supine and eager to participate in PT evaluation. HM, Pulse Ox, non-invasive vent mask on.

## 2023-04-11 LAB
ALBUMIN SERPL ELPH-MCNC: 3.3 G/DL — SIGNIFICANT CHANGE UP (ref 3.3–5)
ALP SERPL-CCNC: 88 U/L — SIGNIFICANT CHANGE UP (ref 40–120)
ALT FLD-CCNC: 35 U/L — SIGNIFICANT CHANGE UP (ref 12–78)
ANION GAP SERPL CALC-SCNC: 1 MMOL/L — LOW (ref 5–17)
AST SERPL-CCNC: 37 U/L — SIGNIFICANT CHANGE UP (ref 15–37)
BILIRUB SERPL-MCNC: 0.4 MG/DL — SIGNIFICANT CHANGE UP (ref 0.2–1.2)
BUN SERPL-MCNC: 76 MG/DL — HIGH (ref 7–23)
CALCIUM SERPL-MCNC: 9.7 MG/DL — SIGNIFICANT CHANGE UP (ref 8.5–10.1)
CHLORIDE SERPL-SCNC: 102 MMOL/L — SIGNIFICANT CHANGE UP (ref 96–108)
CO2 SERPL-SCNC: 34 MMOL/L — HIGH (ref 22–31)
CREAT SERPL-MCNC: 1.54 MG/DL — HIGH (ref 0.5–1.3)
EGFR: 36 ML/MIN/1.73M2 — LOW
GLUCOSE BLDC GLUCOMTR-MCNC: 196 MG/DL — HIGH (ref 70–99)
GLUCOSE BLDC GLUCOMTR-MCNC: 204 MG/DL — HIGH (ref 70–99)
GLUCOSE BLDC GLUCOMTR-MCNC: 248 MG/DL — HIGH (ref 70–99)
GLUCOSE SERPL-MCNC: 197 MG/DL — HIGH (ref 70–99)
HCT VFR BLD CALC: 43.8 % — SIGNIFICANT CHANGE UP (ref 34.5–45)
HGB BLD-MCNC: 12.4 G/DL — SIGNIFICANT CHANGE UP (ref 11.5–15.5)
MAGNESIUM SERPL-MCNC: 2.8 MG/DL — HIGH (ref 1.6–2.6)
MCHC RBC-ENTMCNC: 25.1 PG — LOW (ref 27–34)
MCHC RBC-ENTMCNC: 28.3 GM/DL — LOW (ref 32–36)
MCV RBC AUTO: 88.7 FL — SIGNIFICANT CHANGE UP (ref 80–100)
NT-PROBNP SERPL-SCNC: HIGH PG/ML (ref 0–125)
PLATELET # BLD AUTO: 243 K/UL — SIGNIFICANT CHANGE UP (ref 150–400)
POTASSIUM SERPL-MCNC: 5.4 MMOL/L — HIGH (ref 3.5–5.3)
POTASSIUM SERPL-SCNC: 5.4 MMOL/L — HIGH (ref 3.5–5.3)
PROT SERPL-MCNC: 7.1 GM/DL — SIGNIFICANT CHANGE UP (ref 6–8.3)
RBC # BLD: 4.94 M/UL — SIGNIFICANT CHANGE UP (ref 3.8–5.2)
RBC # FLD: 18 % — HIGH (ref 10.3–14.5)
SODIUM SERPL-SCNC: 137 MMOL/L — SIGNIFICANT CHANGE UP (ref 135–145)
WBC # BLD: 6.84 K/UL — SIGNIFICANT CHANGE UP (ref 3.8–10.5)
WBC # FLD AUTO: 6.84 K/UL — SIGNIFICANT CHANGE UP (ref 3.8–10.5)

## 2023-04-11 PROCEDURE — 99233 SBSQ HOSP IP/OBS HIGH 50: CPT

## 2023-04-11 RX ORDER — SODIUM ZIRCONIUM CYCLOSILICATE 10 G/10G
5 POWDER, FOR SUSPENSION ORAL ONCE
Refills: 0 | Status: COMPLETED | OUTPATIENT
Start: 2023-04-11 | End: 2023-04-11

## 2023-04-11 RX ADMIN — HEPARIN SODIUM 5000 UNIT(S): 5000 INJECTION INTRAVENOUS; SUBCUTANEOUS at 21:25

## 2023-04-11 RX ADMIN — Medication 40 MILLIGRAM(S): at 06:50

## 2023-04-11 RX ADMIN — Medication 40 MILLIGRAM(S): at 14:20

## 2023-04-11 RX ADMIN — BUDESONIDE AND FORMOTEROL FUMARATE DIHYDRATE 2 PUFF(S): 160; 4.5 AEROSOL RESPIRATORY (INHALATION) at 20:55

## 2023-04-11 RX ADMIN — ALBUTEROL 2 PUFF(S): 90 AEROSOL, METERED ORAL at 08:34

## 2023-04-11 RX ADMIN — ALBUTEROL 2 PUFF(S): 90 AEROSOL, METERED ORAL at 00:09

## 2023-04-11 RX ADMIN — Medication 6 UNIT(S): at 17:22

## 2023-04-11 RX ADMIN — ALBUTEROL 2 PUFF(S): 90 AEROSOL, METERED ORAL at 18:52

## 2023-04-11 RX ADMIN — ATORVASTATIN CALCIUM 20 MILLIGRAM(S): 80 TABLET, FILM COATED ORAL at 21:24

## 2023-04-11 RX ADMIN — LOSARTAN POTASSIUM 100 MILLIGRAM(S): 100 TABLET, FILM COATED ORAL at 09:25

## 2023-04-11 RX ADMIN — Medication 1000 UNIT(S): at 09:24

## 2023-04-11 RX ADMIN — SODIUM ZIRCONIUM CYCLOSILICATE 5 GRAM(S): 10 POWDER, FOR SUSPENSION ORAL at 10:00

## 2023-04-11 RX ADMIN — Medication 6 UNIT(S): at 08:53

## 2023-04-11 RX ADMIN — ALBUTEROL 2 PUFF(S): 90 AEROSOL, METERED ORAL at 13:06

## 2023-04-11 RX ADMIN — INSULIN GLARGINE 20 UNIT(S): 100 INJECTION, SOLUTION SUBCUTANEOUS at 21:25

## 2023-04-11 RX ADMIN — Medication 40 MILLIGRAM(S): at 21:26

## 2023-04-11 RX ADMIN — Medication 40 MILLIGRAM(S): at 17:22

## 2023-04-11 RX ADMIN — Medication 50 MILLIGRAM(S): at 09:24

## 2023-04-11 RX ADMIN — BUDESONIDE AND FORMOTEROL FUMARATE DIHYDRATE 2 PUFF(S): 160; 4.5 AEROSOL RESPIRATORY (INHALATION) at 08:33

## 2023-04-11 RX ADMIN — ALBUTEROL 2 PUFF(S): 90 AEROSOL, METERED ORAL at 20:54

## 2023-04-11 RX ADMIN — TIOTROPIUM BROMIDE 2 PUFF(S): 18 CAPSULE ORAL; RESPIRATORY (INHALATION) at 08:37

## 2023-04-11 RX ADMIN — Medication 3 MILLIGRAM(S): at 21:24

## 2023-04-11 RX ADMIN — Medication 81 MILLIGRAM(S): at 09:25

## 2023-04-11 RX ADMIN — PREGABALIN 1000 MICROGRAM(S): 225 CAPSULE ORAL at 09:27

## 2023-04-11 RX ADMIN — Medication 2: at 08:53

## 2023-04-11 RX ADMIN — POLYETHYLENE GLYCOL 3350 17 GRAM(S): 17 POWDER, FOR SOLUTION ORAL at 09:26

## 2023-04-11 RX ADMIN — MAGNESIUM OXIDE 400 MG ORAL TABLET 400 MILLIGRAM(S): 241.3 TABLET ORAL at 09:23

## 2023-04-11 RX ADMIN — Medication 4: at 17:49

## 2023-04-11 RX ADMIN — Medication 40 MILLIGRAM(S): at 12:30

## 2023-04-11 RX ADMIN — HEPARIN SODIUM 5000 UNIT(S): 5000 INJECTION INTRAVENOUS; SUBCUTANEOUS at 09:25

## 2023-04-11 RX ADMIN — SENNA PLUS 2 TABLET(S): 8.6 TABLET ORAL at 21:25

## 2023-04-11 NOTE — PROGRESS NOTE ADULT - SUBJECTIVE AND OBJECTIVE BOX
Subjective:    Review of Systems:  All 10 systems reviewed in detailed and found to be negative with the exception of what has already been described above    Allergies:  latex (Unknown)  No Known Drug Allergies    Meds  MEDICATIONS  (STANDING):  albuterol    90 MICROgram(s) HFA Inhaler 2 Puff(s) Inhalation every 4 hours  aspirin enteric coated 81 milliGRAM(s) Oral daily  atorvastatin 20 milliGRAM(s) Oral at bedtime  budesonide 160 MICROgram(s)/formoterol 4.5 MICROgram(s) Inhaler 2 Puff(s) Inhalation two times a day  cholecalciferol 1000 Unit(s) Oral daily  cyanocobalamin 1000 MICROGram(s) Oral daily  dextrose 5%. 1000 milliLiter(s) (50 mL/Hr) IV Continuous <Continuous>  dextrose 5%. 1000 milliLiter(s) (100 mL/Hr) IV Continuous <Continuous>  dextrose 50% Injectable 25 Gram(s) IV Push once  dextrose 50% Injectable 12.5 Gram(s) IV Push once  dextrose 50% Injectable 25 Gram(s) IV Push once  furosemide   Injectable 40 milliGRAM(s) IV Push three times a day  glucagon  Injectable 1 milliGRAM(s) IntraMuscular once  heparin   Injectable 5000 Unit(s) SubCutaneous every 12 hours  hydrochlorothiazide 12.5 milliGRAM(s) Oral daily  insulin glargine Injectable (LANTUS) 20 Unit(s) SubCutaneous at bedtime  insulin lispro (ADMELOG) corrective regimen sliding scale   SubCutaneous three times a day before meals  insulin lispro (ADMELOG) corrective regimen sliding scale   SubCutaneous at bedtime  insulin lispro Injectable (ADMELOG) 6 Unit(s) SubCutaneous three times a day before meals  losartan 100 milliGRAM(s) Oral daily  magnesium oxide 400 milliGRAM(s) Oral daily  methylPREDNISolone sodium succinate Injectable 40 milliGRAM(s) IV Push every 6 hours  metoprolol succinate ER 50 milliGRAM(s) Oral daily  polyethylene glycol 3350 17 Gram(s) Oral daily  senna 2 Tablet(s) Oral at bedtime  sodium zirconium cyclosilicate 5 Gram(s) Oral once  tiotropium 2.5 MICROgram(s) Inhaler 2 Puff(s) Inhalation daily    MEDICATIONS  (PRN):  acetaminophen     Tablet .. 650 milliGRAM(s) Oral every 6 hours PRN Temp greater or equal to 38C (100.4F), Mild Pain (1 - 3)  aluminum hydroxide/magnesium hydroxide/simethicone Suspension 30 milliLiter(s) Oral every 4 hours PRN Dyspepsia  dextrose Oral Gel 15 Gram(s) Oral once PRN Blood Glucose LESS THAN 70 milliGRAM(s)/deciliter  melatonin 3 milliGRAM(s) Oral at bedtime PRN Insomnia  ondansetron Injectable 4 milliGRAM(s) IV Push every 8 hours PRN Nausea and/or Vomiting    Physical Exam  T(C): 36.3 (04-11-23 @ 08:24), Max: 37 (04-10-23 @ 15:57)  HR: 75 (04-11-23 @ 08:24) (75 - 93)  BP: 109/52 (04-11-23 @ 08:24) (107/57 - 109/52)  RR: 20 (04-11-23 @ 08:24) (18 - 20)  SpO2: 92% (04-11-23 @ 08:24) (91% - 95%)  Gen: Alert, oriented, mild distress on venti mask  HEENT: Anicteric sclera, moist mucous membranes  Cardio: Regular rhythm and rate, normal S1S2, no murmurs  Resp: decrease breath sounds poor air movement  GI: Nontender, nondistended, normoactive bowel sounds  Ext: 2+ edema bilaterally  Neuro: Nonfocal    Labs:                        12.4   6.84  )-----------( 243      ( 11 Apr 2023 08:07 )             43.8     04-11    137  |  102  |  76<H>  ----------------------------<  197<H>  5.4<H>   |  34<H>  |  1.54<H>    Ca    9.7      11 Apr 2023 08:07  Mg     2.8     04-11    TPro  7.1  /  Alb  3.3  /  TBili  0.4  /  DBili  x   /  AST  37  /  ALT  35  /  AlkPhos  88  04-11      < from: CT Angio Chest PE Protocol w/ IV Cont (04.09.23 @ 21:49) >  ACC: [id number] EXAM:  CT ANGIO CHEST PULM ART WAWIC   ORDERED BY: RON MARCELINO     PROCEDURE DATE:  04/09/2023          INTERPRETATION:  CLINICAL INFORMATION: Hypoxia and shortness of breath    COMPARISON: CT chest 2/14/2016.    CONTRAST/COMPLICATIONS:  IV Contrast: Omnipaque 350  80 cc administered   20 cc discarded  Oral Contrast: NONE  Complications: None reported at time of study completion    PROCEDURE:  CT Angiography of the Chest.  Sagittal and coronal reformats were performed as wellas 3D (MIP)   reconstructions.    FINDINGS:    LUNGS AND AIRWAYS: Patent central airways. Compressive atelectasis in the   right middle and bilateral lower lobes adjacent to pleural effusions.   Linear type scarring in the right upper lobe. PLEURA: Small right and   trace left pleural effusions.  MEDIASTINUM AND MARTINEZ: No lymphadenopathy.  VESSELS: No pulmonary embolus. Atherosclerotic calcifications of the   aorta and coronary arteries..  HEART: Mild cardiomegaly. No pericardial effusion.  CHEST WALL AND LOWER NECK: Within normal limits.  VISUALIZED UPPER ABDOMEN: Small volume upper abdominal ascites, grossly   unchanged when compared to prior study. Cholelithiasis.  BONES: Degenerative changes of the spine.    IMPRESSION:  No pulmonary embolus.    Small right and trace left pleural effusions with adjacent compressive   atelectasis.    Small volume upper abdominal ascites.    TTE Echo Complete w/ Contrast w/ Doppler (03.10.23 @ 10:02) >   Impression     Summary     Endocardium is not well visualized, however, overall left ventricular   systolic function appears normal. Technically Difficult Study.   Septal flattening is seen; this finding is consistent with right heart   pressure / volume overload.   Estimated left ventricular ejection fraction is 55-60 %.   Normal appearing left atrium.   The right atrium appears moderately dilated.   The right ventricle is severely dilated.   The right ventricular apex appears hypokinetic.   The aortic valve is trileaflet with thin pliable leaflets.   Moderate mitral annular calcification is present.   There is calcification of mitral valve leaflets. The leaflet opening is   normal.   EA reversal of the mitral inflow consistent with reduced compliance of   the   left ventricle.   Trace mitral regurgitation is present.   The tricuspid valve leaflets are thin and pliable; valve motion is   normal.   Moderate (2+) tricuspid valve regurgitation is present.   Severe pulmonary hypertension.   Pulmonic valve not well seen.   No evidence of pericardial effusion.   No evidence of pleural effusion.   IVC is dilated and not collapsing with inspiration.     Subjective:  remains on venti  feels her breathing has improved    Review of Systems:  All 10 systems reviewed in detailed and found to be negative with the exception of what has already been described above    Allergies:  latex (Unknown)  No Known Drug Allergies    Meds  MEDICATIONS  (STANDING):  albuterol    90 MICROgram(s) HFA Inhaler 2 Puff(s) Inhalation every 4 hours  aspirin enteric coated 81 milliGRAM(s) Oral daily  atorvastatin 20 milliGRAM(s) Oral at bedtime  budesonide 160 MICROgram(s)/formoterol 4.5 MICROgram(s) Inhaler 2 Puff(s) Inhalation two times a day  cholecalciferol 1000 Unit(s) Oral daily  cyanocobalamin 1000 MICROGram(s) Oral daily  dextrose 5%. 1000 milliLiter(s) (50 mL/Hr) IV Continuous <Continuous>  dextrose 5%. 1000 milliLiter(s) (100 mL/Hr) IV Continuous <Continuous>  dextrose 50% Injectable 25 Gram(s) IV Push once  dextrose 50% Injectable 12.5 Gram(s) IV Push once  dextrose 50% Injectable 25 Gram(s) IV Push once  furosemide   Injectable 40 milliGRAM(s) IV Push three times a day  glucagon  Injectable 1 milliGRAM(s) IntraMuscular once  heparin   Injectable 5000 Unit(s) SubCutaneous every 12 hours  hydrochlorothiazide 12.5 milliGRAM(s) Oral daily  insulin glargine Injectable (LANTUS) 20 Unit(s) SubCutaneous at bedtime  insulin lispro (ADMELOG) corrective regimen sliding scale   SubCutaneous three times a day before meals  insulin lispro (ADMELOG) corrective regimen sliding scale   SubCutaneous at bedtime  insulin lispro Injectable (ADMELOG) 6 Unit(s) SubCutaneous three times a day before meals  losartan 100 milliGRAM(s) Oral daily  magnesium oxide 400 milliGRAM(s) Oral daily  methylPREDNISolone sodium succinate Injectable 40 milliGRAM(s) IV Push every 6 hours  metoprolol succinate ER 50 milliGRAM(s) Oral daily  polyethylene glycol 3350 17 Gram(s) Oral daily  senna 2 Tablet(s) Oral at bedtime  sodium zirconium cyclosilicate 5 Gram(s) Oral once  tiotropium 2.5 MICROgram(s) Inhaler 2 Puff(s) Inhalation daily    MEDICATIONS  (PRN):  acetaminophen     Tablet .. 650 milliGRAM(s) Oral every 6 hours PRN Temp greater or equal to 38C (100.4F), Mild Pain (1 - 3)  aluminum hydroxide/magnesium hydroxide/simethicone Suspension 30 milliLiter(s) Oral every 4 hours PRN Dyspepsia  dextrose Oral Gel 15 Gram(s) Oral once PRN Blood Glucose LESS THAN 70 milliGRAM(s)/deciliter  melatonin 3 milliGRAM(s) Oral at bedtime PRN Insomnia  ondansetron Injectable 4 milliGRAM(s) IV Push every 8 hours PRN Nausea and/or Vomiting    Physical Exam  T(C): 36.3 (04-11-23 @ 08:24), Max: 37 (04-10-23 @ 15:57)  HR: 75 (04-11-23 @ 08:24) (75 - 93)  BP: 109/52 (04-11-23 @ 08:24) (107/57 - 109/52)  RR: 20 (04-11-23 @ 08:24) (18 - 20)  SpO2: 92% (04-11-23 @ 08:24) (91% - 95%)  Gen: Alert, oriented, mild distress on venti mask  HEENT: Anicteric sclera, moist mucous membranes  Cardio: Regular rhythm and rate, normal S1S2, no murmurs  Resp: decrease breath sounds poor air movement  GI: Nontender, nondistended, normoactive bowel sounds  Ext: 2+ edema bilaterally  Neuro: Nonfocal    Labs:                        12.4   6.84  )-----------( 243      ( 11 Apr 2023 08:07 )             43.8     04-11    137  |  102  |  76<H>  ----------------------------<  197<H>  5.4<H>   |  34<H>  |  1.54<H>    Ca    9.7      11 Apr 2023 08:07  Mg     2.8     04-11    TPro  7.1  /  Alb  3.3  /  TBili  0.4  /  DBili  x   /  AST  37  /  ALT  35  /  AlkPhos  88  04-11      < from: CT Angio Chest PE Protocol w/ IV Cont (04.09.23 @ 21:49) >  ACC: [id number] EXAM:  CT ANGIO CHEST PULM ART WAWIC   ORDERED BY: RON MARCELINO     PROCEDURE DATE:  04/09/2023          INTERPRETATION:  CLINICAL INFORMATION: Hypoxia and shortness of breath    COMPARISON: CT chest 2/14/2016.    CONTRAST/COMPLICATIONS:  IV Contrast: Omnipaque 350  80 cc administered   20 cc discarded  Oral Contrast: NONE  Complications: None reported at time of study completion    PROCEDURE:  CT Angiography of the Chest.  Sagittal and coronal reformats were performed as wellas 3D (MIP)   reconstructions.    FINDINGS:    LUNGS AND AIRWAYS: Patent central airways. Compressive atelectasis in the   right middle and bilateral lower lobes adjacent to pleural effusions.   Linear type scarring in the right upper lobe. PLEURA: Small right and   trace left pleural effusions.  MEDIASTINUM AND MARTINEZ: No lymphadenopathy.  VESSELS: No pulmonary embolus. Atherosclerotic calcifications of the   aorta and coronary arteries..  HEART: Mild cardiomegaly. No pericardial effusion.  CHEST WALL AND LOWER NECK: Within normal limits.  VISUALIZED UPPER ABDOMEN: Small volume upper abdominal ascites, grossly   unchanged when compared to prior study. Cholelithiasis.  BONES: Degenerative changes of the spine.    IMPRESSION:  No pulmonary embolus.    Small right and trace left pleural effusions with adjacent compressive   atelectasis.    Small volume upper abdominal ascites.    TTE Echo Complete w/ Contrast w/ Doppler (03.10.23 @ 10:02) >   Impression     Summary     Endocardium is not well visualized, however, overall left ventricular   systolic function appears normal. Technically Difficult Study.   Septal flattening is seen; this finding is consistent with right heart   pressure / volume overload.   Estimated left ventricular ejection fraction is 55-60 %.   Normal appearing left atrium.   The right atrium appears moderately dilated.   The right ventricle is severely dilated.   The right ventricular apex appears hypokinetic.   The aortic valve is trileaflet with thin pliable leaflets.   Moderate mitral annular calcification is present.   There is calcification of mitral valve leaflets. The leaflet opening is   normal.   EA reversal of the mitral inflow consistent with reduced compliance of   the   left ventricle.   Trace mitral regurgitation is present.   The tricuspid valve leaflets are thin and pliable; valve motion is   normal.   Moderate (2+) tricuspid valve regurgitation is present.   Severe pulmonary hypertension.   Pulmonic valve not well seen.   No evidence of pericardial effusion.   No evidence of pleural effusion.   IVC is dilated and not collapsing with inspiration.

## 2023-04-11 NOTE — PROGRESS NOTE ADULT - SUBJECTIVE AND OBJECTIVE BOX
CHIEF COMPLAINT: Patient is a 68y old  Female who presents with a chief complaint of Low SpO2 (10 Apr 2023 17:26)      HPI:  69 y/o F with PMH COPD (on 3L O2 PRN), Type 2 DM, pulmonary HTN, LBBB, HTN, HLD presented with low SpO2 at home. Pt states that she did not feel that her lower extremity swelling improved sinc being home. She was in bed all day florentin and did not wake up until later in the day. She took her SpO2 which was 53% on room air. Reports lower extremity swelling, abdominal swelling, facial swelling, increased thirst. Does not feel that she snores or is fatigued during the day time. Has not had a bowel movement in a few days. Denies fevers, chills, chest pain, N/V, diarrhea.    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 Labs: neutrophils 82%, K+ 5.5, glucose 148, AST 67, BNP 8970. VBG: pH 7.26, CO2 81, HCO3 36 -> ABG: pH 7.35, CO2 61, HCO3 24. COVID, influenza A/B, RSV negative. EKG: NSR with HR 86 bpm, PVCs,  ms (personally reviewed). CTA: No pulmonary embolus. Small right and trace left pleural effusions with adjacent compressive atelectasis. Small volume upper abdominal ascites. Cholithiasis.     Pt was given 250 ml of NS, lasix 40 mg IVP, Ipatropium nebulizer, magnesium sulfate, 125 mg IVP Solumedrol. She is being placed on telemetry observation for further management.  (10 Apr 2023 02:51)    Cardiology consulted to evaluate for HFpEF, severe pHTN plan for RHC. Pt; was admitted 3/9-3/13/23 for HFpEF exacerb, now p/w with decreased O2 Sat at home likely due to HFpEF, COPD and severe pHTN,   at present on Venti Mask. Pt's outpatient cardiologist is Bhupendra Joiner in [place].     4/11/23: Pt sitting up in chair.  +SOB but mildly improved.  +Bilat edema L>R  Tele: sinus rhythm    MEDICATIONS  (STANDING):  albuterol    90 MICROgram(s) HFA Inhaler 2 Puff(s) Inhalation every 4 hours  aspirin enteric coated 81 milliGRAM(s) Oral daily  atorvastatin 20 milliGRAM(s) Oral at bedtime  budesonide 160 MICROgram(s)/formoterol 4.5 MICROgram(s) Inhaler 2 Puff(s) Inhalation two times a day  cholecalciferol 1000 Unit(s) Oral daily  cyanocobalamin 1000 MICROGram(s) Oral daily  dextrose 5%. 1000 milliLiter(s) (50 mL/Hr) IV Continuous <Continuous>  dextrose 5%. 1000 milliLiter(s) (100 mL/Hr) IV Continuous <Continuous>  dextrose 50% Injectable 25 Gram(s) IV Push once  dextrose 50% Injectable 12.5 Gram(s) IV Push once  dextrose 50% Injectable 25 Gram(s) IV Push once  furosemide   Injectable 40 milliGRAM(s) IV Push three times a day  glucagon  Injectable 1 milliGRAM(s) IntraMuscular once  heparin   Injectable 5000 Unit(s) SubCutaneous every 12 hours  hydrochlorothiazide 12.5 milliGRAM(s) Oral daily  insulin glargine Injectable (LANTUS) 20 Unit(s) SubCutaneous at bedtime  insulin lispro (ADMELOG) corrective regimen sliding scale   SubCutaneous three times a day before meals  insulin lispro (ADMELOG) corrective regimen sliding scale   SubCutaneous at bedtime  insulin lispro Injectable (ADMELOG) 6 Unit(s) SubCutaneous three times a day before meals  losartan 100 milliGRAM(s) Oral daily  magnesium oxide 400 milliGRAM(s) Oral daily  methylPREDNISolone sodium succinate Injectable 40 milliGRAM(s) IV Push every 6 hours  metoprolol succinate ER 50 milliGRAM(s) Oral daily  polyethylene glycol 3350 17 Gram(s) Oral daily  senna 2 Tablet(s) Oral at bedtime  sodium zirconium cyclosilicate 5 Gram(s) Oral once  tiotropium 2.5 MICROgram(s) Inhaler 2 Puff(s) Inhalation daily    MEDICATIONS  (PRN):  acetaminophen     Tablet .. 650 milliGRAM(s) Oral every 6 hours PRN Temp greater or equal to 38C (100.4F), Mild Pain (1 - 3)  aluminum hydroxide/magnesium hydroxide/simethicone Suspension 30 milliLiter(s) Oral every 4 hours PRN Dyspepsia  dextrose Oral Gel 15 Gram(s) Oral once PRN Blood Glucose LESS THAN 70 milliGRAM(s)/deciliter  melatonin 3 milliGRAM(s) Oral at bedtime PRN Insomnia  ondansetron Injectable 4 milliGRAM(s) IV Push every 8 hours PRN Nausea and/or Vomiting      Allergies    latex (Unknown)  No Known Drug Allergies    Intolerances      Vital Signs Last 24 Hrs  T(C): 36.3 (11 Apr 2023 08:24), Max: 37 (10 Apr 2023 15:57)  T(F): 97.3 (11 Apr 2023 08:24), Max: 98.6 (10 Apr 2023 15:57)  HR: 75 (11 Apr 2023 08:24) (75 - 93)  BP: 109/52 (11 Apr 2023 08:24) (107/57 - 109/52)  BP(mean): --  RR: 20 (11 Apr 2023 08:24) (18 - 20)  SpO2: 92% (11 Apr 2023 08:24) (91% - 95%)    Parameters below as of 11 Apr 2023 08:24  Patient On (Oxygen Delivery Method): mask, Venturi        I&O's Summary    09 Apr 2023 07:01  -  10 Apr 2023 07:00  --------------------------------------------------------  IN: 0 mL / OUT: 500 mL / NET: -500 mL    10 Apr 2023 07:01  -  10 Apr 2023 17:35  --------------------------------------------------------  IN: 350 mL / OUT: 400 mL / NET: -50 mL        PHYSICAL EXAM:    General: Awake and alert, No acute distress.   Skin: Warm, dry, and pink.   Eyes: Pupils equal and reactive to light. No conjunctival injection, discharge, or scleral icterus.   HEENT: Atraumatic, normocephalic.    Cardiology: Normal S1, S2. +SHELDON. Regular rate and rhythm.   Respiratory: Bilateral rhonchi noted    Gastrointestinal: Positive bowel sounds. Soft, non-tender, non-distended.    Extremities: 2+ peripheral edema bilaterally L>R   Neurological: A+Ox3     LABS:                          12.4   6.84  )-----------( 243      ( 11 Apr 2023 08:07 )             43.8                           12.2   4.07  )-----------( 299      ( 10 Apr 2023 08:24 )             42.6                         13.0   4.76  )-----------( 332      ( 09 Apr 2023 17:42 )             46.2     04-11    137  |  102  |  76<H>  ----------------------------<  197<H>  5.4<H>   |  34<H>  |  1.54<H>    Ca    9.7      11 Apr 2023 08:07  Mg     2.8     04-11    TPro  7.1  /  Alb  3.3  /  TBili  0.4  /  DBili  x   /  AST  37  /  ALT  35  /  AlkPhos  88  04-11      10 Apr 2023 08:24    136    |  102    |  65     ----------------------------<  178    5.1     |  30     |  1.21   10 Apr 2023 04:00    x      |  x      |  x      ----------------------------<  180    x       |  x      |  x      09 Apr 2023 17:42    139    |  102    |  59     ----------------------------<  148    5.5     |  34     |  1.18     Ca    9.2        10 Apr 2023 08:24  Ca    9.4        09 Apr 2023 17:42  Mg     2.7       10 Apr 2023 08:24  Mg     2.5       09 Apr 2023 17:42    TPro  7.1    /  Alb  3.4    /  TBili  0.6    /  DBili  x      /  AST  51     /  ALT  40     /  AlkPhos  100    10 Apr 2023 08:24  TPro  7.6    /  Alb  3.7    /  TBili  0.9    /  DBili  x      /  AST  67     /  ALT  40     /  AlkPhos  115    09 Apr 2023 17:42    Radiology/Echocardiogram    < from: Xray Abdomen 2 Views (04.10.23 @ 03:43) >  IMPRESSION:  1. Technically limited study due to both motion and patient's body   habitus.  2. Atelectasis in the right middle and lower lobe of the right lung is   present.  3. Nonspecific mildly dilated centrally located small bowel loops and   partial aeration of the proximal left colon.  4. Chronic degenerative changes of the lumbar spine and right greater   than left hips. 5. AVN of the right hip cannot be excluded from the study.    < end of copied text >          -------------------------------------------------------------------------------------------  < from: TTE Echo Complete w/ Contrast w/ Doppler (03.10.23 @ 10:02) >     Impression     Summary     Endocardium is not well visualized, however, overall left ventricular   systolic function appears normal. Technically Difficult Study.   Septal flattening is seen; this finding is consistent with right heart   pressure / volume overload.   Estimated left ventricular ejection fraction is 55-60 %.   Normal appearing left atrium.   The right atrium appears moderately dilated.   The right ventricle is severely dilated.   The right ventricular apex appears hypokinetic.   The aortic valve is trileaflet with thin pliable leaflets.   Moderate mitral annular calcification is present.   There is calcification of mitral valve leaflets. The leaflet opening is   normal.   EA reversal of the mitral inflow consistent with reduced compliance of   the   left ventricle.   Trace mitral regurgitation is present.   The tricuspid valve leaflets are thin and pliable; valve motion is   normal.   Moderate (2+) tricuspid valve regurgitation is present.   Severe pulmonary hypertension.   Pulmonic valve not well seen.   No evidence of pericardial effusion.   No evidence of pleural effusion.   IVC is dilated and not collapsing with inspiration.     Signature     ----------------------------------------------------------------   Electronically signed by Lyssa BATESInterpreting   physician) on 03/10/2023 10:44 AM   ----------------------------------------------------------------    < end of copied text >             CHIEF COMPLAINT: Patient is a 68y old  Female who presents with a chief complaint of Low SpO2 (10 Apr 2023 17:26)      HPI:  69 y/o F with PMH COPD (on 3L O2 PRN), Type 2 DM, pulmonary HTN, LBBB, HTN, HLD presented with low SpO2 at home. Pt states that she did not feel that her lower extremity swelling improved sinc being home. She was in bed all day florentin and did not wake up until later in the day. She took her SpO2 which was 53% on room air. Reports lower extremity swelling, abdominal swelling, facial swelling, increased thirst. Does not feel that she snores or is fatigued during the day time. Has not had a bowel movement in a few days. Denies fevers, chills, chest pain, N/V, diarrhea.    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 Labs: neutrophils 82%, K+ 5.5, glucose 148, AST 67, BNP 8970. VBG: pH 7.26, CO2 81, HCO3 36 -> ABG: pH 7.35, CO2 61, HCO3 24. COVID, influenza A/B, RSV negative. EKG: NSR with HR 86 bpm, PVCs,  ms (personally reviewed). CTA: No pulmonary embolus. Small right and trace left pleural effusions with adjacent compressive atelectasis. Small volume upper abdominal ascites. Cholithiasis.     Pt was given 250 ml of NS, lasix 40 mg IVP, Ipatropium nebulizer, magnesium sulfate, 125 mg IVP Solumedrol. She is being placed on telemetry observation for further management.  (10 Apr 2023 02:51)    Cardiology consulted to evaluate for HFpEF, severe pHTN plan for RHC. Pt; was admitted 3/9-3/13/23 for HFpEF exacerb, now p/w with decreased O2 Sat at home likely due to HFpEF, COPD and severe pHTN,   at present on Venti Mask. Pt's outpatient cardiologist is Bhupendra Joiner in [place].     4/11/23: Pt sitting up in chair.  +SOB but mildly improved.  +Bilat edema L>R  Tele: sinus rhythm    MEDICATIONS  (STANDING):  albuterol    90 MICROgram(s) HFA Inhaler 2 Puff(s) Inhalation every 4 hours  aspirin enteric coated 81 milliGRAM(s) Oral daily  atorvastatin 20 milliGRAM(s) Oral at bedtime  budesonide 160 MICROgram(s)/formoterol 4.5 MICROgram(s) Inhaler 2 Puff(s) Inhalation two times a day  cholecalciferol 1000 Unit(s) Oral daily  cyanocobalamin 1000 MICROGram(s) Oral daily  dextrose 5%. 1000 milliLiter(s) (50 mL/Hr) IV Continuous <Continuous>  dextrose 5%. 1000 milliLiter(s) (100 mL/Hr) IV Continuous <Continuous>  dextrose 50% Injectable 25 Gram(s) IV Push once  dextrose 50% Injectable 12.5 Gram(s) IV Push once  dextrose 50% Injectable 25 Gram(s) IV Push once  furosemide   Injectable 40 milliGRAM(s) IV Push three times a day  glucagon  Injectable 1 milliGRAM(s) IntraMuscular once  heparin   Injectable 5000 Unit(s) SubCutaneous every 12 hours  hydrochlorothiazide 12.5 milliGRAM(s) Oral daily  insulin glargine Injectable (LANTUS) 20 Unit(s) SubCutaneous at bedtime  insulin lispro (ADMELOG) corrective regimen sliding scale   SubCutaneous three times a day before meals  insulin lispro (ADMELOG) corrective regimen sliding scale   SubCutaneous at bedtime  insulin lispro Injectable (ADMELOG) 6 Unit(s) SubCutaneous three times a day before meals  losartan 100 milliGRAM(s) Oral daily  magnesium oxide 400 milliGRAM(s) Oral daily  methylPREDNISolone sodium succinate Injectable 40 milliGRAM(s) IV Push every 6 hours  metoprolol succinate ER 50 milliGRAM(s) Oral daily  polyethylene glycol 3350 17 Gram(s) Oral daily  senna 2 Tablet(s) Oral at bedtime  sodium zirconium cyclosilicate 5 Gram(s) Oral once  tiotropium 2.5 MICROgram(s) Inhaler 2 Puff(s) Inhalation daily    MEDICATIONS  (PRN):  acetaminophen     Tablet .. 650 milliGRAM(s) Oral every 6 hours PRN Temp greater or equal to 38C (100.4F), Mild Pain (1 - 3)  aluminum hydroxide/magnesium hydroxide/simethicone Suspension 30 milliLiter(s) Oral every 4 hours PRN Dyspepsia  dextrose Oral Gel 15 Gram(s) Oral once PRN Blood Glucose LESS THAN 70 milliGRAM(s)/deciliter  melatonin 3 milliGRAM(s) Oral at bedtime PRN Insomnia  ondansetron Injectable 4 milliGRAM(s) IV Push every 8 hours PRN Nausea and/or Vomiting      Allergies    latex (Unknown)  No Known Drug Allergies    Intolerances      Vital Signs Last 24 Hrs  T(C): 36.3 (11 Apr 2023 08:24), Max: 37 (10 Apr 2023 15:57)  T(F): 97.3 (11 Apr 2023 08:24), Max: 98.6 (10 Apr 2023 15:57)  HR: 75 (11 Apr 2023 08:24) (75 - 93)  BP: 109/52 (11 Apr 2023 08:24) (107/57 - 109/52)  BP(mean): --  RR: 20 (11 Apr 2023 08:24) (18 - 20)  SpO2: 92% (11 Apr 2023 08:24) (91% - 95%)    Parameters below as of 11 Apr 2023 08:24  Patient On (Oxygen Delivery Method): mask, Venturi        I&O's Summary    09 Apr 2023 07:01  -  10 Apr 2023 07:00  --------------------------------------------------------  IN: 0 mL / OUT: 500 mL / NET: -500 mL    10 Apr 2023 07:01  -  10 Apr 2023 17:35  --------------------------------------------------------  IN: 350 mL / OUT: 400 mL / NET: -50 mL        PHYSICAL EXAM:    General: Awake and alert, No acute distress.   Skin: Warm, dry, and pink.   Eyes: Pupils equal and reactive to light. No conjunctival injection, discharge, or scleral icterus.   HEENT: Atraumatic, normocephalic.    Cardiology: Normal S1, S2. +SHELDON. Regular rate and rhythm.   Respiratory: Decreased breath sounds with exp wheeze noted    Gastrointestinal: Positive bowel sounds. Soft, non-tender, non-distended.    Extremities: 2+ peripheral edema bilaterally L>R   Neurological: A+Ox3     LABS:                          12.4   6.84  )-----------( 243      ( 11 Apr 2023 08:07 )             43.8                           12.2   4.07  )-----------( 299      ( 10 Apr 2023 08:24 )             42.6                         13.0   4.76  )-----------( 332      ( 09 Apr 2023 17:42 )             46.2     04-11    137  |  102  |  76<H>  ----------------------------<  197<H>  5.4<H>   |  34<H>  |  1.54<H>    Ca    9.7      11 Apr 2023 08:07  Mg     2.8     04-11    TPro  7.1  /  Alb  3.3  /  TBili  0.4  /  DBili  x   /  AST  37  /  ALT  35  /  AlkPhos  88  04-11      10 Apr 2023 08:24    136    |  102    |  65     ----------------------------<  178    5.1     |  30     |  1.21   10 Apr 2023 04:00    x      |  x      |  x      ----------------------------<  180    x       |  x      |  x      09 Apr 2023 17:42    139    |  102    |  59     ----------------------------<  148    5.5     |  34     |  1.18     Ca    9.2        10 Apr 2023 08:24  Ca    9.4        09 Apr 2023 17:42  Mg     2.7       10 Apr 2023 08:24  Mg     2.5       09 Apr 2023 17:42    TPro  7.1    /  Alb  3.4    /  TBili  0.6    /  DBili  x      /  AST  51     /  ALT  40     /  AlkPhos  100    10 Apr 2023 08:24  TPro  7.6    /  Alb  3.7    /  TBili  0.9    /  DBili  x      /  AST  67     /  ALT  40     /  AlkPhos  115    09 Apr 2023 17:42    Radiology/Echocardiogram    < from: Xray Abdomen 2 Views (04.10.23 @ 03:43) >  IMPRESSION:  1. Technically limited study due to both motion and patient's body   habitus.  2. Atelectasis in the right middle and lower lobe of the right lung is   present.  3. Nonspecific mildly dilated centrally located small bowel loops and   partial aeration of the proximal left colon.  4. Chronic degenerative changes of the lumbar spine and right greater   than left hips. 5. AVN of the right hip cannot be excluded from the study.    < end of copied text >          -------------------------------------------------------------------------------------------  < from: TTE Echo Complete w/ Contrast w/ Doppler (03.10.23 @ 10:02) >     Impression     Summary     Endocardium is not well visualized, however, overall left ventricular   systolic function appears normal. Technically Difficult Study.   Septal flattening is seen; this finding is consistent with right heart   pressure / volume overload.   Estimated left ventricular ejection fraction is 55-60 %.   Normal appearing left atrium.   The right atrium appears moderately dilated.   The right ventricle is severely dilated.   The right ventricular apex appears hypokinetic.   The aortic valve is trileaflet with thin pliable leaflets.   Moderate mitral annular calcification is present.   There is calcification of mitral valve leaflets. The leaflet opening is   normal.   EA reversal of the mitral inflow consistent with reduced compliance of   the   left ventricle.   Trace mitral regurgitation is present.   The tricuspid valve leaflets are thin and pliable; valve motion is   normal.   Moderate (2+) tricuspid valve regurgitation is present.   Severe pulmonary hypertension.   Pulmonic valve not well seen.   No evidence of pericardial effusion.   No evidence of pleural effusion.   IVC is dilated and not collapsing with inspiration.     Signature     ----------------------------------------------------------------   Electronically signed by Lyssa BATESInterpreting   physician) on 03/10/2023 10:44 AM   ----------------------------------------------------------------    < end of copied text >             CHIEF COMPLAINT: Patient is a 68y old  Female who presents with a chief complaint of Low SpO2 (10 Apr 2023 17:26)      HPI:  67 y/o F with PMH COPD (on 3L O2 PRN), Type 2 DM, pulmonary HTN, LBBB, HTN, HLD presented with low SpO2 at home. Pt states that she did not feel that her lower extremity swelling improved sinc being home. She was in bed all day florentin and did not wake up until later in the day. She took her SpO2 which was 53% on room air. Reports lower extremity swelling, abdominal swelling, facial swelling, increased thirst. Does not feel that she snores or is fatigued during the day time. Has not had a bowel movement in a few days. Denies fevers, chills, chest pain, N/V, diarrhea.    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 Labs: neutrophils 82%, K+ 5.5, glucose 148, AST 67, BNP 8970. VBG: pH 7.26, CO2 81, HCO3 36 -> ABG: pH 7.35, CO2 61, HCO3 24. COVID, influenza A/B, RSV negative. EKG: NSR with HR 86 bpm, PVCs,  ms (personally reviewed). CTA: No pulmonary embolus. Small right and trace left pleural effusions with adjacent compressive atelectasis. Small volume upper abdominal ascites. Cholithiasis.     Pt was given 250 ml of NS, lasix 40 mg IVP, Ipatropium nebulizer, magnesium sulfate, 125 mg IVP Solumedrol. She is being placed on telemetry observation for further management.  (10 Apr 2023 02:51)    Cardiology consulted to evaluate for HFpEF, severe pHTN plan for RHC. Pt; was admitted 3/9-3/13/23 for HFpEF exacerb, now p/w with decreased O2 Sat at home likely due to HFpEF, COPD and severe pHTN,   at present on Venti Mask. Pt's outpatient cardiologist is Bhupendra Joiner in [place].     4/11/23: Pt sitting up in chair.  +SOB but mildly improved.  +Bilat edema L>R  Tele: sinus rhythm PVC's, APC's, vent couplets    MEDICATIONS  (STANDING):  albuterol    90 MICROgram(s) HFA Inhaler 2 Puff(s) Inhalation every 4 hours  aspirin enteric coated 81 milliGRAM(s) Oral daily  atorvastatin 20 milliGRAM(s) Oral at bedtime  budesonide 160 MICROgram(s)/formoterol 4.5 MICROgram(s) Inhaler 2 Puff(s) Inhalation two times a day  cholecalciferol 1000 Unit(s) Oral daily  cyanocobalamin 1000 MICROGram(s) Oral daily  dextrose 5%. 1000 milliLiter(s) (50 mL/Hr) IV Continuous <Continuous>  dextrose 5%. 1000 milliLiter(s) (100 mL/Hr) IV Continuous <Continuous>  dextrose 50% Injectable 25 Gram(s) IV Push once  dextrose 50% Injectable 12.5 Gram(s) IV Push once  dextrose 50% Injectable 25 Gram(s) IV Push once  furosemide   Injectable 40 milliGRAM(s) IV Push three times a day  glucagon  Injectable 1 milliGRAM(s) IntraMuscular once  heparin   Injectable 5000 Unit(s) SubCutaneous every 12 hours  hydrochlorothiazide 12.5 milliGRAM(s) Oral daily  insulin glargine Injectable (LANTUS) 20 Unit(s) SubCutaneous at bedtime  insulin lispro (ADMELOG) corrective regimen sliding scale   SubCutaneous three times a day before meals  insulin lispro (ADMELOG) corrective regimen sliding scale   SubCutaneous at bedtime  insulin lispro Injectable (ADMELOG) 6 Unit(s) SubCutaneous three times a day before meals  losartan 100 milliGRAM(s) Oral daily  magnesium oxide 400 milliGRAM(s) Oral daily  methylPREDNISolone sodium succinate Injectable 40 milliGRAM(s) IV Push every 6 hours  metoprolol succinate ER 50 milliGRAM(s) Oral daily  polyethylene glycol 3350 17 Gram(s) Oral daily  senna 2 Tablet(s) Oral at bedtime  sodium zirconium cyclosilicate 5 Gram(s) Oral once  tiotropium 2.5 MICROgram(s) Inhaler 2 Puff(s) Inhalation daily    MEDICATIONS  (PRN):  acetaminophen     Tablet .. 650 milliGRAM(s) Oral every 6 hours PRN Temp greater or equal to 38C (100.4F), Mild Pain (1 - 3)  aluminum hydroxide/magnesium hydroxide/simethicone Suspension 30 milliLiter(s) Oral every 4 hours PRN Dyspepsia  dextrose Oral Gel 15 Gram(s) Oral once PRN Blood Glucose LESS THAN 70 milliGRAM(s)/deciliter  melatonin 3 milliGRAM(s) Oral at bedtime PRN Insomnia  ondansetron Injectable 4 milliGRAM(s) IV Push every 8 hours PRN Nausea and/or Vomiting      Allergies    latex (Unknown)  No Known Drug Allergies    Intolerances      Vital Signs Last 24 Hrs  T(C): 36.3 (11 Apr 2023 08:24), Max: 37 (10 Apr 2023 15:57)  T(F): 97.3 (11 Apr 2023 08:24), Max: 98.6 (10 Apr 2023 15:57)  HR: 75 (11 Apr 2023 08:24) (75 - 93)  BP: 109/52 (11 Apr 2023 08:24) (107/57 - 109/52)  BP(mean): --  RR: 20 (11 Apr 2023 08:24) (18 - 20)  SpO2: 92% (11 Apr 2023 08:24) (91% - 95%)    Parameters below as of 11 Apr 2023 08:24  Patient On (Oxygen Delivery Method): mask, Venturi        I&O's Summary    09 Apr 2023 07:01  -  10 Apr 2023 07:00  --------------------------------------------------------  IN: 0 mL / OUT: 500 mL / NET: -500 mL    10 Apr 2023 07:01  -  10 Apr 2023 17:35  --------------------------------------------------------  IN: 350 mL / OUT: 400 mL / NET: -50 mL        PHYSICAL EXAM:    General: Awake and alert, No acute distress.   Skin: Warm, dry, and pink.   Eyes: Pupils equal and reactive to light. No conjunctival injection, discharge, or scleral icterus.   HEENT: Atraumatic, normocephalic.    Cardiology: Normal S1, S2. +SHELDON. Regular rate and rhythm.   Respiratory: Decreased breath sounds with exp wheeze noted    Gastrointestinal: Positive bowel sounds. Soft, non-tender, non-distended.    Extremities: 2+ peripheral edema bilaterally L>R   Neurological: A+Ox3     LABS:                          12.4   6.84  )-----------( 243      ( 11 Apr 2023 08:07 )             43.8                           12.2   4.07  )-----------( 299      ( 10 Apr 2023 08:24 )             42.6                         13.0   4.76  )-----------( 332      ( 09 Apr 2023 17:42 )             46.2     04-11    137  |  102  |  76<H>  ----------------------------<  197<H>  5.4<H>   |  34<H>  |  1.54<H>    Ca    9.7      11 Apr 2023 08:07  Mg     2.8     04-11    TPro  7.1  /  Alb  3.3  /  TBili  0.4  /  DBili  x   /  AST  37  /  ALT  35  /  AlkPhos  88  04-11      10 Apr 2023 08:24    136    |  102    |  65     ----------------------------<  178    5.1     |  30     |  1.21   10 Apr 2023 04:00    x      |  x      |  x      ----------------------------<  180    x       |  x      |  x      09 Apr 2023 17:42    139    |  102    |  59     ----------------------------<  148    5.5     |  34     |  1.18     Ca    9.2        10 Apr 2023 08:24  Ca    9.4        09 Apr 2023 17:42  Mg     2.7       10 Apr 2023 08:24  Mg     2.5       09 Apr 2023 17:42    TPro  7.1    /  Alb  3.4    /  TBili  0.6    /  DBili  x      /  AST  51     /  ALT  40     /  AlkPhos  100    10 Apr 2023 08:24  TPro  7.6    /  Alb  3.7    /  TBili  0.9    /  DBili  x      /  AST  67     /  ALT  40     /  AlkPhos  115    09 Apr 2023 17:42    Radiology/Echocardiogram    < from: Xray Abdomen 2 Views (04.10.23 @ 03:43) >  IMPRESSION:  1. Technically limited study due to both motion and patient's body   habitus.  2. Atelectasis in the right middle and lower lobe of the right lung is   present.  3. Nonspecific mildly dilated centrally located small bowel loops and   partial aeration of the proximal left colon.  4. Chronic degenerative changes of the lumbar spine and right greater   than left hips. 5. AVN of the right hip cannot be excluded from the study.    < end of copied text >          -------------------------------------------------------------------------------------------  < from: TTE Echo Complete w/ Contrast w/ Doppler (03.10.23 @ 10:02) >     Impression     Summary     Endocardium is not well visualized, however, overall left ventricular   systolic function appears normal. Technically Difficult Study.   Septal flattening is seen; this finding is consistent with right heart   pressure / volume overload.   Estimated left ventricular ejection fraction is 55-60 %.   Normal appearing left atrium.   The right atrium appears moderately dilated.   The right ventricle is severely dilated.   The right ventricular apex appears hypokinetic.   The aortic valve is trileaflet with thin pliable leaflets.   Moderate mitral annular calcification is present.   There is calcification of mitral valve leaflets. The leaflet opening is   normal.   EA reversal of the mitral inflow consistent with reduced compliance of   the   left ventricle.   Trace mitral regurgitation is present.   The tricuspid valve leaflets are thin and pliable; valve motion is   normal.   Moderate (2+) tricuspid valve regurgitation is present.   Severe pulmonary hypertension.   Pulmonic valve not well seen.   No evidence of pericardial effusion.   No evidence of pleural effusion.   IVC is dilated and not collapsing with inspiration.     Signature     ----------------------------------------------------------------   Electronically signed by Lyssa BATESInterpreting   physician) on 03/10/2023 10:44 AM   ----------------------------------------------------------------    < end of copied text >

## 2023-04-11 NOTE — PROGRESS NOTE ADULT - PROBLEM SELECTOR PLAN 1
·  Problem: Acute on chronic diastolic congestive heart failure.   ·  Recommendation: pt; with known HFpEF p/w decreased O2 Sat at home, likely multifactorial  due to acute on chronic HFpEF exacerbation, severe pHTN,, COPD exacerbation and CARRINGTON/obesity; elevated BNP 8970 - similar to previous admission in March 2023.  Echo from 3/23 shows preserved LVEF 55-60%, with severely dilated RV, severe pHTN and moderate TR     Pt is fluid overloaded, continue lasix 40 mg iv TID, please record daily weights, strict I&Os, fluid restriction 1.5L/day.     Once more euvolemic will plan for RHC confirm she is adequately diuresed - was admitted 1 month ago w/ similar fluid overload presentation.  My colleague Walter discussed w/ Dr. Jolly - RHC for evaluation of pulmonary pressures & vasodilator testing may also be helpful.  Discussed with patient who would like to discuss with her out patient cardiologist prior to proceeding with RHC ·  Problem: Acute on chronic diastolic congestive heart failure.   ·  Recommendation: pt; with known HFpEF p/w decreased O2 Sat at home, likely multifactorial  due to acute on chronic HFpEF exacerbation, severe pHTN,, COPD exacerbation and CARRINGTON/obesity; elevated BNP 8970 - similar to previous admission in March 2023.  Echo from 3/23 shows preserved LVEF 55-60%, with severely dilated RV, severe pHTN and moderate TR     Pt is fluid overloaded, continue lasix 40 mg iv TID, please record daily weights, strict I&Os, fluid restriction 1.5L/day. Recheck BNP    Once more euvolemic will plan for RHC confirm she is adequately diuresed - was admitted 1 month ago w/ similar fluid overload presentation.  My colleague Walter discussed w/ Dr. Jolly - RHC for evaluation of pulmonary pressures & vasodilator testing may also be helpful.  Discussed with patient who would like to discuss with her out patient cardiologist prior to proceeding with RHC

## 2023-04-11 NOTE — PROGRESS NOTE ADULT - ASSESSMENT
69 y/o F presented with low SpO2 at home      1. Acute hypoxic and hypercapneic respiratory distress likely secondary to acute CHF exacerbation, COPD exacerbation, severe pulmonary HTN, possible CARRINGTON and obesity hypoventilation syndrome   - Telemetry observation   - SpO2 53% -> % on Bipap -> 93% on 50% venti mask   - c/w Bipap QHS and PRN   - BNP 8970, pt is overloaded on exam, CTA: b/l effusions, abd ascites   - ECHO (3/10/23): EF 55-60%, RA and RV dilation, moderate 2+ tricuspid regurgitation, severe pulmonary HTN   - s/p 40 mg IVP lasix in the ER, continue with 40 mg IVP TID  -- wean as tolerated  - c/w home medications: beta blocker and ARB   - Strict I+Os, daily weights   - 2L H20 restriction, 2g sodium restriction      - Keep K > 4 and Mg > 2    - Albuterol Q4H standing   - c/w Spiriva and Symbicort   - s/p 125 mg of Solumedrol, c/w Solumedrol 40 mg Q6H and taper down   - May need to consider starting Revatio or Bosentan or consideration for right heart cath, need to discuss further with cardiology and pulmonology given severe pulmonary HTN   - Cardiology consult - Dr. Parker   - Pulmonology consult - Dr. Mauricio     2. Hyperkalemia   - K+ 5.5 -> 5.1 -> 5.4, will give additional dose of lokelma   - trend K+     3. Hyperglycemia secondary to Type 2 DM   - Glucose 148, monitor   - Ordered HbA1c - 7.3  - Carb restriction on diet   - Will start 0.3 total daily units of insulin daily in insulin naive pt - Lantus 20 units QHS, Ademelog 6 units TIDAC, moderate dose ISS -> monitor sugars closely may need to increase given steroids for COPD    5. Constipation   - c/w bowel regimen     6. History of COPD (on nocturnal home O2), Type 2 DM, pulmonary HTN, LBBB, HTN, HLD   - c/w home medications; verified with pt at the bedside  - Cholithiasis -> f/u with PCP outpt   - Hold Nisoldipine given lower extremity swelling     DVT ppx: Heparin 5,000 units Q12H (hold for PLT < 50,000)     Code status: DNR/DNI (Completed MOLST form with the pt at the bedside. The patient is A+Ox3 at the time of my evaluation and has full capacity. Able to make an informed decision and understands risks associated with decisions made. Pt is DNR/DNI, no feeding tube. Agreeable to IVF, IV abx, trial of Bipap. MOLST completed and placed into chart).   Emergency contact: Randy Fiore (son [phone]) or Manish Fiore (son [phone])

## 2023-04-11 NOTE — PROGRESS NOTE ADULT - ASSESSMENT
68y old Female with PMH COPD (on 3L O2 PRN), Type 2 DM, pulmonary HTN, LBBB, HTN, HLD wit hypoxic resp failure from COPD exacerbation and acute CHF exacerbation  1. Acute on chronic diastolic congestive heart failure: continue lasix  -supplemental o2  -continue hydrochlorothiazine  2. Very severe COPD / acute on chronic hypercarbic respiratory failure with hypoxia: continue symbicort, spiriva  -continue albuterol PRN  -continue methylprednisolone  3. Pulmonary HTN. Group 2 and group 3 etiology. Optimize treatment for chronic cardiac and pulmonary disease. Continue supplemental O2.   4. ? CARRINGTON / obesity hypoventilation. Consider outpatient eval for CARRINGTON if patient agrees  5. pleural effusions: likely from volume overload. medical management. f/u cxr after diuresis    spoke to [place] 68y old Female with PMH COPD (on 3L O2 PRN), Type 2 DM, pulmonary HTN, LBBB, HTN, HLD wit hypoxic resp failure from COPD exacerbation and acute CHF exacerbation  1. Acute on chronic diastolic congestive heart failure: continue lasix  -supplemental o2  -continue hydrochlorothiazine  2. Very severe COPD / acute on chronic hypercarbic respiratory failure with hypoxia: continue symbicort, spiriva  -continue albuterol PRN  -continue methylprednisolone  3. Pulmonary HTN. Group 2 and group 3 etiology. Optimize treatment for chronic cardiac and pulmonary disease. Continue supplemental O2.   -spoke to cardiology, agree with plan for rhc  4. ? CARRINGTON / obesity hypoventilation. Consider outpatient eval for CARRINGTON if patient agrees  5. pleural effusions: likely from volume overload. medical management. f/u cxr after diuresis    spoke to [place] 68y old Female with PMH COPD (on 3L O2 PRN), Type 2 DM, pulmonary HTN, LBBB, HTN, HLD wit hypoxic resp failure from COPD exacerbation and acute CHF exacerbation  1. Acute on chronic diastolic congestive heart failure: continue lasix  -supplemental o2  -continue hydrochlorothiazine  2. Very severe COPD / acute on chronic hypercarbic respiratory failure with hypoxia: continue symbicort, spiriva  -continue albuterol PRN  -continue methylprednisolone  3. Pulmonary HTN. Group 2 and group 3 etiology. Optimize treatment for chronic cardiac and pulmonary disease. Continue supplemental O2.   -spoke to cardiology, agree with plan for rhc  4. ? CARRINGTON / obesity hypoventilation. Consider outpatient eval for CARRINGTON if patient agrees  5. pleural effusions: likely from volume overload. medical management. f/u cxr after diuresis

## 2023-04-11 NOTE — PROGRESS NOTE ADULT - SUBJECTIVE AND OBJECTIVE BOX
Chief Complaint: Patient is a 68y old  Female who presents with a chief complaint of Low SpO2 (10 Apr 2023 11:42)      Interval events:   - VSS with O2 >92% on 50% venti mask   - Reports continued SOB/LE edema slowly improving, constipation , no other current complaints   - IV lasix increased to TID per cardiology    ROS:   Patient denies any current chest pain, cough, f/c/n/v/d, abd pain, myalgias, dysuria, HA, dizziness  All other review of systems is negative unless indicated above    Physical Exam:  Vital Signs Last 24 Hrs  T(C): 36.7 (11 Apr 2023 16:14), Max: 36.7 (10 Apr 2023 22:16)  T(F): 98.1 (11 Apr 2023 16:14), Max: 98.1 (11 Apr 2023 16:14)  HR: 97 (11 Apr 2023 16:14) (75 - 97)  BP: 96/48 (11 Apr 2023 16:14) (96/48 - 109/52)  BP(mean): --  RR: 19 (11 Apr 2023 16:14) (19 - 20)  SpO2: 93% (11 Apr 2023 16:14) (91% - 95%)    Parameters below as of 11 Apr 2023 16:14  Patient On (Oxygen Delivery Method): mask, Venturi    Constitutional: NAD, awake and alert, obese female   HEENT: PERRLA, EOMI, MMM  Respiratory: dec BS at bases / expiratory wheeze improving  Cardiovascular: S1 and S2, RRR, no murmurs, gallops or rubs  Gastrointestinal: +BS, soft, non-tender, non-distended, no CVA tenderness  Extremities: +LE edema b/l, +DP pulses b/l  Neurological: A&O x 3, no focal deficits  Musculoskeletal: 5/5 strength b/l upper and lower extremities  Skin: Normal, skin warm and dry    Labs:  04-11-23 @ 08:07  Glucose 197 [70 - 99]  CO2 total 34 [22 - 31]  Chloride 102 [96 - 108]  Sodium 137 [135 - 145]  Potassium 5.4 [3.5 - 5.3]  Calcium 9.7 [8.5 - 10.1]  Creatinine 1.54 [0.50 - 1.30]  BUN 76 [7 - 23]  eGFR 36  Anion gap 1 [5 - 17]  AST 37 [15 - 37]  ALT 35 [12 - 78]  Alk phos 88 [40 - 120]  Albumin 3.3 [3.3 - 5.0]    WBC 6.84 [3.80 - 10.50]  Hemoglobin 12.4 [11.5 - 15.5]  Hematocrit 43.8 [34.5 - 45.0]  Platelets 243 [150 - 400]      04-10 @ 08:24  Glucose 178 mg/dL  HCO3 30 mmol/L  Chloride 102 mmol/L  Sodium 136 mmol/L>   Potassium 5.1 mmol/L  Creatinine 1.21 mg/dL  Calcium 9.2 mg/dL  BUN 65 mg/dL  eGFR 48 mL/min/1.73m2  Anion gap 4 mmol/L    WBC 4.07  Hemoglobin 12.2  Hemoatocrit 42.6  Platelet count 299          Cardiac testing:  Troponin I, High Sensitivity Result: 36.31 ng/L (04-09-23 @ 21:05)  Troponin I, High Sensitivity Result: 34.35 ng/L (04-09-23 @ 17:42)        12 Lead ECG:   Ventricular Rate 86 BPM    Atrial Rate 86 BPM    P-R Interval 162 ms    QRS Duration 126 ms    Q-T Interval 406 ms    QTC Calculation(Bazett) 485 ms    P Axis 65 degrees    R Axis 230 degrees    T Axis 86 degrees    Diagnosis Line Sinus rhythm withfrequent Premature ventricular complexes  Indeterminate axis  Intraventricular conduction block  Possible Anterolateral infarct (cited on or before 16-FEB-2016)  Abnormal ECG  When compared with ECG of 10-MAR-2023 07:38,  Premature ventricular complexes are now Present  QRS axis Shifted left  Confirmed by CORNELIUS MELENDEZ ([id number]) on 4/10/2023 4:54:07 PM (04-09-23 @ 17:49)      TTE Echo Complete w/ Contrast w/ Doppler:   PROCEDURE DATE:  03/10/2023      INTERPRETATION:  Transthoracic Echocardiography Report (TTE)   Impression     Summary     Endocardium is not well visualized, however, overall left ventricular   systolic function appears normal. Technically Difficult Study.   Septal flattening is seen; this finding is consistent with right heart   pressure / volume overload.   Estimated left ventricular ejection fraction is 55-60 %.   Normal appearing left atrium.   The right atriumappears moderately dilated.   The right ventricle is severely dilated.   The right ventricular apex appears hypokinetic.   The aortic valve is trileaflet with thin pliable leaflets.   Moderate mitral annular calcification is present.   There is calcification of mitral valve leaflets. The leaflet opening is   normal.   EA reversal of the mitral inflow consistent with reduced compliance of   the   left ventricle.   Trace mitral regurgitation is present.   The tricuspid valve leaflets are thin and pliable; valve motion is   normal.   Moderate (2+) tricuspid valve regurgitation is present.   Severe pulmonary hypertension.   Pulmonic valve not well seen.   No evidence of pericardial effusion.   No evidence of pleural effusion.   IVC is dilated andnot collapsing with inspiration.     Signature     ----------------------------------------------------------------   Electronically signed by Lyssa Horta MD(Interpreting   physician) on 03/10/2023 10:44 AM   ----------------------------------------------------------------      Radiology:  < from: CT Angio Chest PE Protocol w/ IV Cont (04.09.23 @ 21:49) >    IMPRESSION:  No pulmonary embolus.    Small right and trace left pleural effusions with adjacent compressive   atelectasis.    Small volume upper abdominal ascites.    < end of copied text >      Medications:  MEDICATIONS  (STANDING):  albuterol    90 MICROgram(s) HFA Inhaler 2 Puff(s) Inhalation every 4 hours  aspirin enteric coated 81 milliGRAM(s) Oral daily  atorvastatin 20 milliGRAM(s) Oral at bedtime  budesonide 160 MICROgram(s)/formoterol 4.5 MICROgram(s) Inhaler 2 Puff(s) Inhalation two times a day  cholecalciferol 1000 Unit(s) Oral daily  cyanocobalamin 1000 MICROGram(s) Oral daily  dextrose 5%. 1000 milliLiter(s) (50 mL/Hr) IV Continuous <Continuous>  dextrose 5%. 1000 milliLiter(s) (100 mL/Hr) IV Continuous <Continuous>  dextrose 50% Injectable 25 Gram(s) IV Push once  dextrose 50% Injectable 12.5 Gram(s) IV Push once  dextrose 50% Injectable 25 Gram(s) IV Push once  furosemide   Injectable 40 milliGRAM(s) IV Push two times a day  glucagon  Injectable 1 milliGRAM(s) IntraMuscular once  heparin   Injectable 5000 Unit(s) SubCutaneous every 12 hours  hydrochlorothiazide 12.5 milliGRAM(s) Oral daily  insulin glargine Injectable (LANTUS) 20 Unit(s) SubCutaneous at bedtime  insulin lispro (ADMELOG) corrective regimen sliding scale   SubCutaneous three times a day before meals  insulin lispro (ADMELOG) corrective regimen sliding scale   SubCutaneous at bedtime  insulin lispro Injectable (ADMELOG) 6 Unit(s) SubCutaneous three times a day before meals  losartan 100 milliGRAM(s) Oral daily  magnesium oxide 400 milliGRAM(s) Oral daily  methylPREDNISolone sodium succinate Injectable 40 milliGRAM(s) IV Push every 6 hours  metoprolol succinate ER 50 milliGRAM(s) Oral daily  polyethylene glycol 3350 17 Gram(s) Oral daily  senna 2 Tablet(s) Oral at bedtime  tiotropium 2.5 MICROgram(s) Inhaler 2 Puff(s) Inhalation daily    MEDICATIONS  (PRN):  acetaminophen     Tablet .. 650 milliGRAM(s) Oral every 6 hours PRN Temp greater or equal to 38C (100.4F), Mild Pain (1 - 3)  aluminum hydroxide/magnesium hydroxide/simethicone Suspension 30 milliLiter(s) Oral every 4 hours PRN Dyspepsia  dextrose Oral Gel 15 Gram(s) Oral once PRN Blood Glucose LESS THAN 70 milliGRAM(s)/deciliter  melatonin 3 milliGRAM(s) Oral at bedtime PRN Insomnia  ondansetron Injectable 4 milliGRAM(s) IV Push every 8 hours PRN Nausea and/or Vomiting

## 2023-04-12 LAB
ALBUMIN SERPL ELPH-MCNC: 3.2 G/DL — LOW (ref 3.3–5)
ALP SERPL-CCNC: 77 U/L — SIGNIFICANT CHANGE UP (ref 40–120)
ALT FLD-CCNC: 34 U/L — SIGNIFICANT CHANGE UP (ref 12–78)
ANION GAP SERPL CALC-SCNC: 2 MMOL/L — LOW (ref 5–17)
AST SERPL-CCNC: 25 U/L — SIGNIFICANT CHANGE UP (ref 15–37)
BILIRUB SERPL-MCNC: 0.4 MG/DL — SIGNIFICANT CHANGE UP (ref 0.2–1.2)
BUN SERPL-MCNC: 80 MG/DL — HIGH (ref 7–23)
CALCIUM SERPL-MCNC: 9.4 MG/DL — SIGNIFICANT CHANGE UP (ref 8.5–10.1)
CHLORIDE SERPL-SCNC: 101 MMOL/L — SIGNIFICANT CHANGE UP (ref 96–108)
CO2 SERPL-SCNC: 38 MMOL/L — HIGH (ref 22–31)
CREAT SERPL-MCNC: 1.58 MG/DL — HIGH (ref 0.5–1.3)
EGFR: 35 ML/MIN/1.73M2 — LOW
GLUCOSE BLDC GLUCOMTR-MCNC: 208 MG/DL — HIGH (ref 70–99)
GLUCOSE BLDC GLUCOMTR-MCNC: 209 MG/DL — HIGH (ref 70–99)
GLUCOSE BLDC GLUCOMTR-MCNC: 265 MG/DL — HIGH (ref 70–99)
GLUCOSE BLDC GLUCOMTR-MCNC: 307 MG/DL — HIGH (ref 70–99)
GLUCOSE SERPL-MCNC: 198 MG/DL — HIGH (ref 70–99)
HCT VFR BLD CALC: 44.7 % — SIGNIFICANT CHANGE UP (ref 34.5–45)
HGB BLD-MCNC: 12.2 G/DL — SIGNIFICANT CHANGE UP (ref 11.5–15.5)
MAGNESIUM SERPL-MCNC: 2.7 MG/DL — HIGH (ref 1.6–2.6)
MCHC RBC-ENTMCNC: 24.9 PG — LOW (ref 27–34)
MCHC RBC-ENTMCNC: 27.3 GM/DL — LOW (ref 32–36)
MCV RBC AUTO: 91.2 FL — SIGNIFICANT CHANGE UP (ref 80–100)
PLATELET # BLD AUTO: 265 K/UL — SIGNIFICANT CHANGE UP (ref 150–400)
POTASSIUM SERPL-MCNC: 4.7 MMOL/L — SIGNIFICANT CHANGE UP (ref 3.5–5.3)
POTASSIUM SERPL-SCNC: 4.7 MMOL/L — SIGNIFICANT CHANGE UP (ref 3.5–5.3)
PROT SERPL-MCNC: 6.7 GM/DL — SIGNIFICANT CHANGE UP (ref 6–8.3)
RBC # BLD: 4.9 M/UL — SIGNIFICANT CHANGE UP (ref 3.8–5.2)
RBC # FLD: 17.8 % — HIGH (ref 10.3–14.5)
SODIUM SERPL-SCNC: 141 MMOL/L — SIGNIFICANT CHANGE UP (ref 135–145)
WBC # BLD: 6.97 K/UL — SIGNIFICANT CHANGE UP (ref 3.8–10.5)
WBC # FLD AUTO: 6.97 K/UL — SIGNIFICANT CHANGE UP (ref 3.8–10.5)

## 2023-04-12 PROCEDURE — 99233 SBSQ HOSP IP/OBS HIGH 50: CPT

## 2023-04-12 PROCEDURE — 99223 1ST HOSP IP/OBS HIGH 75: CPT

## 2023-04-12 RX ORDER — FUROSEMIDE 40 MG
40 TABLET ORAL
Refills: 0 | Status: DISCONTINUED | OUTPATIENT
Start: 2023-04-12 | End: 2023-04-14

## 2023-04-12 RX ADMIN — Medication 40 MILLIGRAM(S): at 12:20

## 2023-04-12 RX ADMIN — Medication 50 MILLIGRAM(S): at 10:10

## 2023-04-12 RX ADMIN — LOSARTAN POTASSIUM 100 MILLIGRAM(S): 100 TABLET, FILM COATED ORAL at 10:09

## 2023-04-12 RX ADMIN — Medication 6 UNIT(S): at 08:03

## 2023-04-12 RX ADMIN — HEPARIN SODIUM 5000 UNIT(S): 5000 INJECTION INTRAVENOUS; SUBCUTANEOUS at 10:09

## 2023-04-12 RX ADMIN — ATORVASTATIN CALCIUM 20 MILLIGRAM(S): 80 TABLET, FILM COATED ORAL at 22:15

## 2023-04-12 RX ADMIN — Medication 8: at 17:21

## 2023-04-12 RX ADMIN — ALBUTEROL 2 PUFF(S): 90 AEROSOL, METERED ORAL at 21:04

## 2023-04-12 RX ADMIN — BUDESONIDE AND FORMOTEROL FUMARATE DIHYDRATE 2 PUFF(S): 160; 4.5 AEROSOL RESPIRATORY (INHALATION) at 09:28

## 2023-04-12 RX ADMIN — Medication 40 MILLIGRAM(S): at 06:23

## 2023-04-12 RX ADMIN — Medication 1000 UNIT(S): at 10:08

## 2023-04-12 RX ADMIN — Medication 40 MILLIGRAM(S): at 00:17

## 2023-04-12 RX ADMIN — INSULIN GLARGINE 20 UNIT(S): 100 INJECTION, SOLUTION SUBCUTANEOUS at 22:16

## 2023-04-12 RX ADMIN — POLYETHYLENE GLYCOL 3350 17 GRAM(S): 17 POWDER, FOR SOLUTION ORAL at 10:09

## 2023-04-12 RX ADMIN — ALBUTEROL 2 PUFF(S): 90 AEROSOL, METERED ORAL at 14:40

## 2023-04-12 RX ADMIN — Medication 40 MILLIGRAM(S): at 23:43

## 2023-04-12 RX ADMIN — Medication 81 MILLIGRAM(S): at 10:08

## 2023-04-12 RX ADMIN — Medication 4: at 08:04

## 2023-04-12 RX ADMIN — ALBUTEROL 2 PUFF(S): 90 AEROSOL, METERED ORAL at 00:40

## 2023-04-12 RX ADMIN — Medication 6 UNIT(S): at 12:21

## 2023-04-12 RX ADMIN — Medication 6: at 12:22

## 2023-04-12 RX ADMIN — BUDESONIDE AND FORMOTEROL FUMARATE DIHYDRATE 2 PUFF(S): 160; 4.5 AEROSOL RESPIRATORY (INHALATION) at 21:04

## 2023-04-12 RX ADMIN — TIOTROPIUM BROMIDE 2 PUFF(S): 18 CAPSULE ORAL; RESPIRATORY (INHALATION) at 09:28

## 2023-04-12 RX ADMIN — Medication 40 MILLIGRAM(S): at 17:24

## 2023-04-12 RX ADMIN — Medication 6 UNIT(S): at 17:22

## 2023-04-12 RX ADMIN — PREGABALIN 1000 MICROGRAM(S): 225 CAPSULE ORAL at 10:08

## 2023-04-12 RX ADMIN — ALBUTEROL 2 PUFF(S): 90 AEROSOL, METERED ORAL at 09:28

## 2023-04-12 RX ADMIN — Medication 40 MILLIGRAM(S): at 13:26

## 2023-04-12 RX ADMIN — SENNA PLUS 2 TABLET(S): 8.6 TABLET ORAL at 22:15

## 2023-04-12 RX ADMIN — HEPARIN SODIUM 5000 UNIT(S): 5000 INJECTION INTRAVENOUS; SUBCUTANEOUS at 22:15

## 2023-04-12 RX ADMIN — MAGNESIUM OXIDE 400 MG ORAL TABLET 400 MILLIGRAM(S): 241.3 TABLET ORAL at 10:10

## 2023-04-12 NOTE — CONSULT NOTE ADULT - SUBJECTIVE AND OBJECTIVE BOX
HPI:  67 y/o F with HFpEF PMH HTN, HLD, COPD (on 3L O2 PRN), pHTN, LBBB, DM, who presented to  ED on  for dyspnea, LE edema, and 20 lb weight gain.  She was recently admitted for similar symptoms and discharged on 3/14, she initially felt well at discharge with her weight of 271 lbs. Her weight increased over the next few weeks, peaking at 294.  She also reported worsening dyspnea and abdominal swelling.  on , she was much more fatigued and noticed her SPO2 in the 70s.  She called 911 and was brought to  ED.  She was found to have SPO2 of 53% in ED which improved to 90s on BIPAP.  his CTA was negative for PE with small R and trace L effusions, K was 5.5, BNP 8970 viral panel was negative.  She was given nebulizer and IV lasix with some improvement      ER course: SpO2 53% -> % on Bipap -> 93% on 3L O2. Labs: neutrophils 82%, K+ 5.5, glucose 148, AST 67, BNP 8970. VBG: pH 7.26, CO2 81, HCO3 36 -> ABG: pH 7.35, CO2 61, HCO3 24. COVID, influenza A/B, RSV negative. EKG: NSR with HR 86 bpm, PVCs,  ms (personally reviewed). CTA: No pulmonary embolus. Small right and trace left pleural effusions with adjacent compressive atelectasis. Small volume upper abdominal ascites. Cholithiasis.     Pt was given 250 ml of NS, lasix 40 mg IVP, Ipatropium nebulizer, magnesium sulfate, 125 mg IVP Solumedrol. She is being placed on telemetry observation for further management.  (10 Apr 2023 02:51)    PAST MEDICAL & SURGICAL HISTORY:  COPD, severe  H/O pulmonary hypertension  Hypertension  History of left bundle branch block (LBBB)  DM (diabetes mellitus), type 2  Hyperlipidemia    S/P       REVIEW OF SYSTEMS:  14 point ROS negative in detail apart from as documented in HPI.    MEDICATIONS  (STANDING):  albuterol    90 MICROgram(s) HFA Inhaler 2 Puff(s) Inhalation every 4 hours  aspirin enteric coated 81 milliGRAM(s) Oral daily  atorvastatin 20 milliGRAM(s) Oral at bedtime  budesonide 160 MICROgram(s)/formoterol 4.5 MICROgram(s) Inhaler 2 Puff(s) Inhalation two times a day  cholecalciferol 1000 Unit(s) Oral daily  cyanocobalamin 1000 MICROGram(s) Oral daily  dextrose 5%. 1000 milliLiter(s) (50 mL/Hr) IV Continuous <Continuous>  dextrose 5%. 1000 milliLiter(s) (100 mL/Hr) IV Continuous <Continuous>  dextrose 50% Injectable 25 Gram(s) IV Push once  dextrose 50% Injectable 12.5 Gram(s) IV Push once  dextrose 50% Injectable 25 Gram(s) IV Push once  furosemide   Injectable 40 milliGRAM(s) IV Push three times a day  glucagon  Injectable 1 milliGRAM(s) IntraMuscular once  heparin   Injectable 5000 Unit(s) SubCutaneous every 12 hours  hydrochlorothiazide 12.5 milliGRAM(s) Oral daily  insulin glargine Injectable (LANTUS) 20 Unit(s) SubCutaneous at bedtime  insulin lispro (ADMELOG) corrective regimen sliding scale   SubCutaneous three times a day before meals  insulin lispro (ADMELOG) corrective regimen sliding scale   SubCutaneous at bedtime  insulin lispro Injectable (ADMELOG) 6 Unit(s) SubCutaneous three times a day before meals  losartan 100 milliGRAM(s) Oral daily  magnesium oxide 400 milliGRAM(s) Oral daily  methylPREDNISolone sodium succinate Injectable 40 milliGRAM(s) IV Push every 6 hours  metoprolol succinate ER 50 milliGRAM(s) Oral daily  polyethylene glycol 3350 17 Gram(s) Oral daily  senna 2 Tablet(s) Oral at bedtime  tiotropium 2.5 MICROgram(s) Inhaler 2 Puff(s) Inhalation daily    MEDICATIONS  (PRN):  acetaminophen     Tablet .. 650 milliGRAM(s) Oral every 6 hours PRN Temp greater or equal to 38C (100.4F), Mild Pain (1 - 3)  aluminum hydroxide/magnesium hydroxide/simethicone Suspension 30 milliLiter(s) Oral every 4 hours PRN Dyspepsia  dextrose Oral Gel 15 Gram(s) Oral once PRN Blood Glucose LESS THAN 70 milliGRAM(s)/deciliter  melatonin 3 milliGRAM(s) Oral at bedtime PRN Insomnia  ondansetron Injectable 4 milliGRAM(s) IV Push every 8 hours PRN Nausea and/or Vomiting    Home Medications:  Advair Diskus 250 mcg-50 mcg inhalation powder: 1 puff(s) inhaled 2 times a day (10 Apr 2023 02:27)  Albuterol (Eqv-Proventil HFA) 90 mcg/inh inhalation aerosol: 2 puff(s) inhaled every 6 hours (10 Apr 2023 02:27)  Aspirin Enteric Coated 81 mg oral delayed release tablet: 1 tab(s) orally once a day (10 Apr 2023 02:27)  atorvastatin 20 mg oral tablet: 1 tab(s) orally once a day (10 Apr 2023 02:27)  cyanocobalamin 1000 mcg oral tablet: 1 tab(s) orally once a day (10 Apr 2023 02:27)  losartan-hydroCHLOROthiazide 100 mg-12.5 mg oral tablet: 1 tab(s) orally once a day (10 Apr 2023 02:27)  magnesium oxide 500 mg oral tablet: 1 tab(s) orally once a day (10 Apr 2023 02:27)  metFORMIN 500 mg oral tablet, extended release: 4 tab(s) orally once a day (10 Apr 2023 02:27)  metoprolol succinate 50 mg oral tablet, extended release: 1 tab(s) orally once a day (10 Apr 2023 02:27)  nisoldipine 8.5 mg oral tablet, extended release: 1 tab(s) orally once a day (10 Apr 2023 02:27)  Spiriva 18 mcg inhalation capsule: 1 cap(s) inhaled once a day (10 Apr 2023 02:27)  Trulicity Pen 1.5 mg/0.5 mL subcutaneous solution: 1 application subcutaneous every 7 days (10 Apr 2023 02:27)  Vitamin D3 25 mcg (1000 intl units) oral tablet: 1 tab(s) orally once a day (10 Apr 2023 02:)    Allergies    latex (Unknown)  No Known Drug Allergies    Intolerances      Social History:  Lives by herself. Her sons live close by. Smoked 1-2 packs a day for many years. Denies alcohol and drug use. (10 Apr 2023 02:51)    FAMILY HISTORY:  FH: heart disease  FH: COPD (chronic obstructive pulmonary disease) (Father, Mother)  FH: CHF (congestive heart failure) (Mother)  FH: lung cancer (Father)        ICU Vital Signs Last 24 Hrs  T(C): 37.1, Max: 37.1 (04-12 @ 08:42)  HR: 86 (68 - 97)  BP: 104/61 (96/48 - 108/67)  BP(mean): 71 (71 - 71)  ABP: --  ABP(mean): --  RR: 20 (19 - 20)  SpO2: 96% (93% - 96%)    Weight in k.8 (23)  Weight in k (23)      I&O's Summary Last 24 Hrs    Tele: SInus 70s-80s     Physical Exam:    General: No distress. Comfortable.  HEENT: EOM intact.  Neck: Neck supple. JVP not elevated. No masses  Chest: Clear to auscultation bilaterally  CV: Normal S1 and S2. No murmurs, rub, or gallops. Radial pulses normal.  Abdomen: Soft, non-distended, non-tender  Skin: No rashes or skin breakdown  LE:  Warm, 1-2+ LE edema   Neurology: Alert and oriented times three. Sensation intact  Psych: Affect normal    Labs:    ( 23 @ 07:47 )               12.2   6.97  )--------( 265                  44.7     ( 23 @ 07:47 )     141  |  101  |  80  ---------------------<  198  4.7  |  38  |  1.58    Ca 9.4  Phos x   Mg 2.7    ( 23 @ 07:47 )  TPro  6.7  /  Alb  3.2  /  TBili  0.4  /  DBili  x   /  AST  25  /  ALT  34  /  AlkPhos  77  ( 23 @ 08:07 )  TPro  7.1  /  Alb  3.3  /  TBili  0.4  /  DBili  x   /  AST  37  /  ALT  35  /  AlkPhos  88                      RADIOLOGY & ADDITIONAL STUDIES: HPI:  67 y/o F with HFpEF PMH HTN, HLD, COPD (on 3L O2 PRN), pHTN, LBBB, DM, who presented to  ED on  for dyspnea, LE edema, and weight gain.  She was recently admitted for similar symptoms and discharged on 3/14, she initially felt well at discharge with her weight of 271 lbs. She also reported worsening dyspnea and abdominal swelling.  on , she was much more fatigued and noticed her SPO2 in the 70s.  She called 911 and was brought to  ED.  She was found to have SPO2 of 53% in ED which improved to 90s on BIPAP.  his CTA was negative for PE with small R and trace L effusions, K was 5.5, BNP 8970 viral panel was negative.  She was given nebulizer and IV lasix with some improvement.  She has continued to receive IV Diuresis with some improvement in her breathing.  She denies syncope, lightheadedness, dizziness, chest pain, palpitations, PND, nausea, vomiting.     PAST MEDICAL & SURGICAL HISTORY:  COPD, severe  H/O pulmonary hypertension  Hypertension  History of left bundle branch block (LBBB)  DM (diabetes mellitus), type 2  Hyperlipidemia    S/P       REVIEW OF SYSTEMS:  14 point ROS negative in detail apart from as documented in HPI.    MEDICATIONS  (STANDING):  albuterol    90 MICROgram(s) HFA Inhaler 2 Puff(s) Inhalation every 4 hours  aspirin enteric coated 81 milliGRAM(s) Oral daily  atorvastatin 20 milliGRAM(s) Oral at bedtime  budesonide 160 MICROgram(s)/formoterol 4.5 MICROgram(s) Inhaler 2 Puff(s) Inhalation two times a day  cholecalciferol 1000 Unit(s) Oral daily  cyanocobalamin 1000 MICROGram(s) Oral daily  dextrose 5%. 1000 milliLiter(s) (50 mL/Hr) IV Continuous <Continuous>  dextrose 5%. 1000 milliLiter(s) (100 mL/Hr) IV Continuous <Continuous>  dextrose 50% Injectable 25 Gram(s) IV Push once  dextrose 50% Injectable 12.5 Gram(s) IV Push once  dextrose 50% Injectable 25 Gram(s) IV Push once  furosemide   Injectable 40 milliGRAM(s) IV Push three times a day  glucagon  Injectable 1 milliGRAM(s) IntraMuscular once  heparin   Injectable 5000 Unit(s) SubCutaneous every 12 hours  hydrochlorothiazide 12.5 milliGRAM(s) Oral daily  insulin glargine Injectable (LANTUS) 20 Unit(s) SubCutaneous at bedtime  insulin lispro (ADMELOG) corrective regimen sliding scale   SubCutaneous three times a day before meals  insulin lispro (ADMELOG) corrective regimen sliding scale   SubCutaneous at bedtime  insulin lispro Injectable (ADMELOG) 6 Unit(s) SubCutaneous three times a day before meals  losartan 100 milliGRAM(s) Oral daily  magnesium oxide 400 milliGRAM(s) Oral daily  methylPREDNISolone sodium succinate Injectable 40 milliGRAM(s) IV Push every 6 hours  metoprolol succinate ER 50 milliGRAM(s) Oral daily  polyethylene glycol 3350 17 Gram(s) Oral daily  senna 2 Tablet(s) Oral at bedtime  tiotropium 2.5 MICROgram(s) Inhaler 2 Puff(s) Inhalation daily    MEDICATIONS  (PRN):  acetaminophen     Tablet .. 650 milliGRAM(s) Oral every 6 hours PRN Temp greater or equal to 38C (100.4F), Mild Pain (1 - 3)  aluminum hydroxide/magnesium hydroxide/simethicone Suspension 30 milliLiter(s) Oral every 4 hours PRN Dyspepsia  dextrose Oral Gel 15 Gram(s) Oral once PRN Blood Glucose LESS THAN 70 milliGRAM(s)/deciliter  melatonin 3 milliGRAM(s) Oral at bedtime PRN Insomnia  ondansetron Injectable 4 milliGRAM(s) IV Push every 8 hours PRN Nausea and/or Vomiting    Home Medications:  Advair Diskus 250 mcg-50 mcg inhalation powder: 1 puff(s) inhaled 2 times a day (10 Apr 2023 02:27)  Albuterol (Eqv-Proventil HFA) 90 mcg/inh inhalation aerosol: 2 puff(s) inhaled every 6 hours (10 Apr 2023 02:27)  Aspirin Enteric Coated 81 mg oral delayed release tablet: 1 tab(s) orally once a day (10 Apr 2023 02:27)  atorvastatin 20 mg oral tablet: 1 tab(s) orally once a day (10 Apr 2023 02:27)  cyanocobalamin 1000 mcg oral tablet: 1 tab(s) orally once a day (10 Apr 2023 02:27)  losartan-hydroCHLOROthiazide 100 mg-12.5 mg oral tablet: 1 tab(s) orally once a day (10 Apr 2023 02:27)  magnesium oxide 500 mg oral tablet: 1 tab(s) orally once a day (10 Apr 2023 02:27)  metFORMIN 500 mg oral tablet, extended release: 4 tab(s) orally once a day (10 Apr 2023 02:27)  metoprolol succinate 50 mg oral tablet, extended release: 1 tab(s) orally once a day (10 Apr 2023 02:)  nisoldipine 8.5 mg oral tablet, extended release: 1 tab(s) orally once a day (10 Apr 2023 02:27)  Spiriva 18 mcg inhalation capsule: 1 cap(s) inhaled once a day (10 Apr 2023 02:27)  Trulicity Pen 1.5 mg/0.5 mL subcutaneous solution: 1 application subcutaneous every 7 days (10 Apr 2023 02:27)  Vitamin D3 25 mcg (1000 intl units) oral tablet: 1 tab(s) orally once a day (10 Apr 2023 02:)    Allergies    latex (Unknown)  No Known Drug Allergies    Intolerances      Social History:  Lives by herself. Her sons live close by. Smoked 1-2 packs a day for many years. Denies alcohol and drug use. (10 Apr 2023 02:51)    FAMILY HISTORY:  FH: heart disease  FH: COPD (chronic obstructive pulmonary disease) (Father, Mother)  FH: CHF (congestive heart failure) (Mother)  FH: lung cancer (Father)        ICU Vital Signs Last 24 Hrs  T(C): 37.1, Max: 37.1 (-12 @ 08:42)  HR: 86 (68 - 97)  BP: 104/61 (96/48 - 108/67)  BP(mean): 71 (71 - 71)  ABP: --  ABP(mean): --  RR: 20 (19 - 20)  SpO2: 96% (93% - 96%)    Weight in k.8 (-)  Weight in k (23)      I&O's Summary Last 24 Hrs    Tele: SInus 70s-80s     Physical Exam:    General: No distress. Comfortable.  HEENT: EOM intact.  Neck: Neck supple. JVP not elevated. No masses  Chest: Clear to auscultation bilaterally  CV: Normal S1 and S2. No murmurs, rub, or gallops. Radial pulses normal.  Abdomen: Soft, non-distended, non-tender  Skin: No rashes or skin breakdown  LE:  Warm, 1-2+ LE edema   Neurology: Alert and oriented times three. Sensation intact  Psych: Affect normal    Labs:    ( 23 @ 07:47 )               12.2   6.97  )--------( 265                  44.7     ( 23 @ 07:47 )     141  |  101  |  80  ---------------------<  198  4.7  |  38  |  1.58    Ca 9.4  Phos x   Mg 2.7    ( 23 @ 07:47 )  TPro  6.7  /  Alb  3.2  /  TBili  0.4  /  DBili  x   /  AST  25  /  ALT  34  /  AlkPhos  77  ( 23 @ 08:07 )  TPro  7.1  /  Alb  3.3  /  TBili  0.4  /  DBili  x   /  AST  37  /  ALT  35  /  AlkPhos  88                      RADIOLOGY & ADDITIONAL STUDIES:

## 2023-04-12 NOTE — PROGRESS NOTE ADULT - SUBJECTIVE AND OBJECTIVE BOX
Subjective:  could not tolerate bipap. she doesnt know if it is the mask fit or the air pressure  breathing about the same    Review of Systems:  All 10 systems reviewed in detailed and found to be negative with the exception of what has already been described above    Allergies:  latex (Unknown)  No Known Drug Allergies    Meds  MEDICATIONS  (STANDING):  albuterol    90 MICROgram(s) HFA Inhaler 2 Puff(s) Inhalation every 4 hours  aspirin enteric coated 81 milliGRAM(s) Oral daily  atorvastatin 20 milliGRAM(s) Oral at bedtime  budesonide 160 MICROgram(s)/formoterol 4.5 MICROgram(s) Inhaler 2 Puff(s) Inhalation two times a day  cholecalciferol 1000 Unit(s) Oral daily  cyanocobalamin 1000 MICROGram(s) Oral daily  dextrose 5%. 1000 milliLiter(s) (50 mL/Hr) IV Continuous <Continuous>  dextrose 5%. 1000 milliLiter(s) (100 mL/Hr) IV Continuous <Continuous>  dextrose 50% Injectable 25 Gram(s) IV Push once  dextrose 50% Injectable 12.5 Gram(s) IV Push once  dextrose 50% Injectable 25 Gram(s) IV Push once  furosemide   Injectable 40 milliGRAM(s) IV Push three times a day  glucagon  Injectable 1 milliGRAM(s) IntraMuscular once  heparin   Injectable 5000 Unit(s) SubCutaneous every 12 hours  hydrochlorothiazide 12.5 milliGRAM(s) Oral daily  insulin glargine Injectable (LANTUS) 20 Unit(s) SubCutaneous at bedtime  insulin lispro (ADMELOG) corrective regimen sliding scale   SubCutaneous three times a day before meals  insulin lispro (ADMELOG) corrective regimen sliding scale   SubCutaneous at bedtime  insulin lispro Injectable (ADMELOG) 6 Unit(s) SubCutaneous three times a day before meals  losartan 100 milliGRAM(s) Oral daily  magnesium oxide 400 milliGRAM(s) Oral daily  methylPREDNISolone sodium succinate Injectable 40 milliGRAM(s) IV Push every 6 hours  metoprolol succinate ER 50 milliGRAM(s) Oral daily  polyethylene glycol 3350 17 Gram(s) Oral daily  senna 2 Tablet(s) Oral at bedtime  tiotropium 2.5 MICROgram(s) Inhaler 2 Puff(s) Inhalation daily    MEDICATIONS  (PRN):  acetaminophen     Tablet .. 650 milliGRAM(s) Oral every 6 hours PRN Temp greater or equal to 38C (100.4F), Mild Pain (1 - 3)  aluminum hydroxide/magnesium hydroxide/simethicone Suspension 30 milliLiter(s) Oral every 4 hours PRN Dyspepsia  dextrose Oral Gel 15 Gram(s) Oral once PRN Blood Glucose LESS THAN 70 milliGRAM(s)/deciliter  melatonin 3 milliGRAM(s) Oral at bedtime PRN Insomnia  ondansetron Injectable 4 milliGRAM(s) IV Push every 8 hours PRN Nausea and/or Vomiting    Physical Exam  T(C): 37.1 (04-12-23 @ 08:42), Max: 37.1 (04-12-23 @ 08:42)  HR: 76 (04-12-23 @ 08:42) (68 - 97)  BP: 97/50 (04-12-23 @ 08:42) (96/48 - 108/67)  RR: 20 (04-12-23 @ 08:42) (19 - 20)  SpO2: 96% (04-12-23 @ 08:42) (93% - 96%)  Gen: Alert, oriented, mild distress on venti mask  HEENT: Anicteric sclera, moist mucous membranes  Cardio: Regular rhythm and rate, normal S1S2, no murmurs  Resp: decrease breath sounds poor air movement  GI: Nontender, nondistended, normoactive bowel sounds  Ext: 2+ edema bilaterally  Neuro: Nonfocal    Labs:                        12.4   6.84  )-----------( 243      ( 11 Apr 2023 08:07 )             43.8     04-12    141  |  101  |  80<H>  ----------------------------<  198<H>  4.7   |  38<H>  |  1.58<H>    Ca    9.4      12 Apr 2023 07:47  Mg     2.7     04-12    TPro  6.7  /  Alb  3.2<L>  /  TBili  0.4  /  DBili  x   /  AST  25  /  ALT  34  /  AlkPhos  77  04-12      < from: CT Angio Chest PE Protocol w/ IV Cont (04.09.23 @ 21:49) >  ACC: [id number] EXAM:  CT ANGIO CHEST PULM ART WAWIC   ORDERED BY: RON MARCELINO     PROCEDURE DATE:  04/09/2023          INTERPRETATION:  CLINICAL INFORMATION: Hypoxia and shortness of breath    COMPARISON: CT chest 2/14/2016.    CONTRAST/COMPLICATIONS:  IV Contrast: Omnipaque 350  80 cc administered   20 cc discarded  Oral Contrast: NONE  Complications: None reported at time of study completion    PROCEDURE:  CT Angiography of the Chest.  Sagittal and coronal reformats were performed as wellas 3D (MIP)   reconstructions.    FINDINGS:    LUNGS AND AIRWAYS: Patent central airways. Compressive atelectasis in the   right middle and bilateral lower lobes adjacent to pleural effusions.   Linear type scarring in the right upper lobe. PLEURA: Small right and   trace left pleural effusions.  MEDIASTINUM AND MARTINEZ: No lymphadenopathy.  VESSELS: No pulmonary embolus. Atherosclerotic calcifications of the   aorta and coronary arteries..  HEART: Mild cardiomegaly. No pericardial effusion.  CHEST WALL AND LOWER NECK: Within normal limits.  VISUALIZED UPPER ABDOMEN: Small volume upper abdominal ascites, grossly   unchanged when compared to prior study. Cholelithiasis.  BONES: Degenerative changes of the spine.    IMPRESSION:  No pulmonary embolus.    Small right and trace left pleural effusions with adjacent compressive   atelectasis.    Small volume upper abdominal ascites.    TTE Echo Complete w/ Contrast w/ Doppler (03.10.23 @ 10:02) >   Impression     Summary     Endocardium is not well visualized, however, overall left ventricular   systolic function appears normal. Technically Difficult Study.   Septal flattening is seen; this finding is consistent with right heart   pressure / volume overload.   Estimated left ventricular ejection fraction is 55-60 %.   Normal appearing left atrium.   The right atrium appears moderately dilated.   The right ventricle is severely dilated.   The right ventricular apex appears hypokinetic.   The aortic valve is trileaflet with thin pliable leaflets.   Moderate mitral annular calcification is present.   There is calcification of mitral valve leaflets. The leaflet opening is   normal.   EA reversal of the mitral inflow consistent with reduced compliance of   the   left ventricle.   Trace mitral regurgitation is present.   The tricuspid valve leaflets are thin and pliable; valve motion is   normal.   Moderate (2+) tricuspid valve regurgitation is present.   Severe pulmonary hypertension.   Pulmonic valve not well seen.   No evidence of pericardial effusion.   No evidence of pleural effusion.   IVC is dilated and not collapsing with inspiration.

## 2023-04-12 NOTE — PROGRESS NOTE ADULT - ASSESSMENT
68y old Female with PMH COPD (on 3L O2 PRN), Type 2 DM, pulmonary HTN, LBBB, HTN, HLD wit hypoxic resp failure from COPD exacerbation and acute CHF exacerbation  1. Acute on chronic diastolic congestive heart failure: continue lasix. cr rising unfortunately so may need to decrease dose  -supplemental o2  -continue hydrochlorothiazine  2. Very severe COPD / acute on chronic hypercarbic respiratory failure with hypoxia: continue symbicort, spiriva  -continue albuterol PRN  -continue methylprednisolone  3. Pulmonary HTN. Group 2 and group 3 etiology. Optimize treatment for chronic cardiac and pulmonary disease. Continue supplemental O2.   -spoke to cardiology, agree with plan for rhc  4. ? CARRINGTON / obesity hypoventilation. Consider outpatient eval for CARRINGTON if patient agrees  -she has a hard time tolerating bipap. may need supplemental o2 at night instead  5. pleural effusions: likely from volume overload. medical management. f/u cxr after diuresis

## 2023-04-12 NOTE — PROGRESS NOTE ADULT - ASSESSMENT
69 y/o F presented with low SpO2 at home      1. Acute hypoxic and hypercapneic respiratory distress likely secondary to acute CHF exacerbation, COPD exacerbation, severe pulmonary HTN, possible CARRINGTON and obesity hypoventilation syndrome   - Telemetry   - SpO2 53% -> % on Bipap -> 93% on 50% venti mask   - c/w Bipap QHS and PRN   - BNP 8970, pt is overloaded on exam, CTA: b/l effusions, abd ascites   - ECHO (3/10/23): EF 55-60%, RA and RV dilation, moderate 2+ tricuspid regurgitation, severe pulmonary HTN   - s/p 40 mg IVP lasix in the ER, continue with 40 mg IVP TID  -- changed to 40mg BID   - c/w home medications: beta blocker and ARB . STOP HCTZ/ norvasc  - Strict I+Os, daily weights   - 2L H20 restriction, 2g sodium restriction      - Keep K > 4 and Mg > 2    - Albuterol Q4H standing   - c/w Spiriva and Symbicort   - s/p 125 mg of Solumedrol, c/w Solumedrol 40 mg Q6H and taper down   - May need to consider starting Revatio or Bosentan or consideration for right heart cath, need to discuss further with cardiology and pulmonology given severe pulmonary HTN   - Cardiology consult - Dr. Parker   - Pulmonology consult - Dr. Mauricio     2. Hyperkalemia   - K+ 5.5 -> 5.1 -> 5.4 -> 4.7, s/p lokelma, give PRN  - trend K+     3. Hyperglycemia secondary to Type 2 DM   - Glucose 148, monitor   - Ordered HbA1c - 7.3  - Carb restriction on diet   - C/w Lantus 20 units QHS, Ademelog 6 units TIDAC, moderate dose ISS -> monitor sugars closely may need to increase given steroids for COPD    5. Constipation   - c/w bowel regimen     6. History of COPD (on nocturnal home O2), Type 2 DM, pulmonary HTN, LBBB, HTN, HLD   - c/w home medications; verified with pt at the bedside  - Cholithiasis -> f/u with PCP outpt   - Hold Nisoldipine given lower extremity swelling     DVT ppx: Heparin 5,000 units Q12H (hold for PLT < 50,000)     Code status: DNR/DNI (Completed MOLST form with the pt at the bedside. The patient is A+Ox3 at the time of my evaluation and has full capacity. Able to make an informed decision and understands risks associated with decisions made. Pt is DNR/DNI, no feeding tube. Agreeable to IVF, IV abx, trial of Bipap. MOLST completed and placed into chart).   Emergency contact: Randy Fiore (son [phone]) or Manish Fiore (son [phone])

## 2023-04-12 NOTE — CONSULT NOTE ADULT - PROBLEM SELECTOR RECOMMENDATION 9
- 2/2 pHTN, COPD, obesity   - unable to appreciate any JVP elevations, weight near her recent discharge weight   - change lasix to home dose 40 mg po BID   - continue losartan 100 mg po daily    - continue Toprol 50 mg po daily    - holding spironolactone in setting of recent hyperkalemia   - avoid HCTZ in setting of FRANCIS   - avoid CCB in outpatient setting with recurrent LE edema   - Spoke to her endo about SGLT2i, can consider as an outpatient when renal function improves,    - will speak to primary cardiologist Dr. Joiner about possible cardiomems, otherwise no RHC needed at this time.

## 2023-04-12 NOTE — CONSULT NOTE ADULT - NS ATTEND AMEND GEN_ALL_CORE FT
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 Of note she was discharged one month ago for a heart failure exacerbation.    She appears to be euvolemic based on JVD from telehealth camera. Her Cr is rising and she received contrast on 4/9. Given recent admissions, CardioMEMS may be helpful.     REVIEW OF STUDIES  TTE: LV nondilated, LVEF 55-60%, dilated RV with moderate dysfunction, estimated PASP 71 mmHg, plethoric IVC     PLAN  # Acute on chronic diastolic heart failure  -Suspect her HFpEF is driven by PH related RV dysfunction   -Switch IV lasix back to PO 40 mg BID, please follow strict I's and O's and daily standing weights   -Plan for SGLT2i before discharge   -Will discuss CardioMEMS with her cardiologist, if amenable will plan to perform this admission     # Pulmonary hypertension  -Likely combination of HFpEF, COPD, [place]  -Given WHO II/III, grace not benefit from vasodilators  -If proceeding with CardioMEMS will assess her PH invasively     Favor palliative care consult

## 2023-04-12 NOTE — PROGRESS NOTE ADULT - SUBJECTIVE AND OBJECTIVE BOX
CHIEF COMPLAINT: Patient is a 68y old  Female who presents with a chief complaint of Low SpO2 (10 Apr 2023 17:26)      HPI:  69 y/o F with PMH COPD (on 3L O2 PRN), Type 2 DM, pulmonary HTN, LBBB, HTN, HLD presented with low SpO2 at home. Pt states that she did not feel that her lower extremity swelling improved sinc being home. She was in bed all day florentin and did not wake up until later in the day. She took her SpO2 which was 53% on room air. Reports lower extremity swelling, abdominal swelling, facial swelling, increased thirst. Does not feel that she snores or is fatigued during the day time. Has not had a bowel movement in a few days. Denies fevers, chills, chest pain, N/V, diarrhea.    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 Labs: neutrophils 82%, K+ 5.5, glucose 148, AST 67, BNP 8970. VBG: pH 7.26, CO2 81, HCO3 36 -> ABG: pH 7.35, CO2 61, HCO3 24. COVID, influenza A/B, RSV negative. EKG: NSR with HR 86 bpm, PVCs,  ms (personally reviewed). CTA: No pulmonary embolus. Small right and trace left pleural effusions with adjacent compressive atelectasis. Small volume upper abdominal ascites. Cholithiasis.     Pt was given 250 ml of NS, lasix 40 mg IVP, Ipatropium nebulizer, magnesium sulfate, 125 mg IVP Solumedrol. She is being placed on telemetry observation for further management.  (10 Apr 2023 02:51)    Cardiology consulted to evaluate for HFpEF, severe pHTN plan for RHC. Pt; was admitted 3/9-3/13/23 for HFpEF exacerb, now p/w with decreased O2 Sat at home likely due to HFpEF, COPD and severe pHTN,   at present on Venti Mask. Pt's outpatient cardiologist is Bhupendra Joiner in [place].     4/11/23: Pt sitting up in chair.  +SOB but mildly improved.  +Bilat edema L>R  Tele: sinus rhythm PVC's, APC's, vent couplets  4/12/23: feels better, Tele: NSR 70s    MEDICATIONS  (STANDING):  albuterol    90 MICROgram(s) HFA Inhaler 2 Puff(s) Inhalation every 4 hours  aspirin enteric coated 81 milliGRAM(s) Oral daily  atorvastatin 20 milliGRAM(s) Oral at bedtime  budesonide 160 MICROgram(s)/formoterol 4.5 MICROgram(s) Inhaler 2 Puff(s) Inhalation two times a day  cholecalciferol 1000 Unit(s) Oral daily  cyanocobalamin 1000 MICROGram(s) Oral daily  dextrose 5%. 1000 milliLiter(s) (50 mL/Hr) IV Continuous <Continuous>  dextrose 5%. 1000 milliLiter(s) (100 mL/Hr) IV Continuous <Continuous>  dextrose 50% Injectable 25 Gram(s) IV Push once  dextrose 50% Injectable 12.5 Gram(s) IV Push once  dextrose 50% Injectable 25 Gram(s) IV Push once  furosemide   Injectable 40 milliGRAM(s) IV Push three times a day  glucagon  Injectable 1 milliGRAM(s) IntraMuscular once  heparin   Injectable 5000 Unit(s) SubCutaneous every 12 hours  hydrochlorothiazide 12.5 milliGRAM(s) Oral daily  insulin glargine Injectable (LANTUS) 20 Unit(s) SubCutaneous at bedtime  insulin lispro (ADMELOG) corrective regimen sliding scale   SubCutaneous three times a day before meals  insulin lispro (ADMELOG) corrective regimen sliding scale   SubCutaneous at bedtime  insulin lispro Injectable (ADMELOG) 6 Unit(s) SubCutaneous three times a day before meals  losartan 100 milliGRAM(s) Oral daily  magnesium oxide 400 milliGRAM(s) Oral daily  methylPREDNISolone sodium succinate Injectable 40 milliGRAM(s) IV Push every 6 hours  metoprolol succinate ER 50 milliGRAM(s) Oral daily  polyethylene glycol 3350 17 Gram(s) Oral daily  senna 2 Tablet(s) Oral at bedtime  tiotropium 2.5 MICROgram(s) Inhaler 2 Puff(s) Inhalation daily      MEDICATIONS  (PRN):  acetaminophen     Tablet .. 650 milliGRAM(s) Oral every 6 hours PRN Temp greater or equal to 38C (100.4F), Mild Pain (1 - 3)  aluminum hydroxide/magnesium hydroxide/simethicone Suspension 30 milliLiter(s) Oral every 4 hours PRN Dyspepsia  dextrose Oral Gel 15 Gram(s) Oral once PRN Blood Glucose LESS THAN 70 milliGRAM(s)/deciliter  melatonin 3 milliGRAM(s) Oral at bedtime PRN Insomnia  ondansetron Injectable 4 milliGRAM(s) IV Push every 8 hours PRN Nausea and/or Vomiting    Vital Signs Last 24 Hrs  T(C): 37.1 (12 Apr 2023 08:42), Max: 37.1 (12 Apr 2023 08:42)  T(F): 98.8 (12 Apr 2023 08:42), Max: 98.8 (12 Apr 2023 08:42)  HR: 86 (12 Apr 2023 10:04) (68 - 97)  BP: 104/61 (12 Apr 2023 10:04) (96/48 - 108/67)  BP(mean): 71 (12 Apr 2023 05:31) (71 - 71)  RR: 20 (12 Apr 2023 08:42) (19 - 20)  SpO2: 96% (12 Apr 2023 08:42) (93% - 96%)    Parameters below as of 12 Apr 2023 08:42  Patient On (Oxygen Delivery Method): mask, Venturi    O2 Concentration (%): 50      PHYSICAL EXAM:    General: Awake and alert, No acute distress.   Skin: Warm, dry, and pink.   Eyes: Pupils equal and reactive to light. No conjunctival injection, discharge, or scleral icterus.   HEENT: Atraumatic, normocephalic.    Cardiology: Normal S1, S2. +SHELDON. Regular rate and rhythm.   Respiratory: Decreased breath sounds with exp wheeze noted    Gastrointestinal: Positive bowel sounds. Soft, non-tender, non-distended.    Extremities: 2+ peripheral edema bilaterally L>R   Neurological: A+Ox3     LABS:                          12.4   6.84  )-----------( 243      ( 11 Apr 2023 08:07 )             43.8                           12.2   4.07  )-----------( 299      ( 10 Apr 2023 08:24 )             42.6                         13.0   4.76  )-----------( 332      ( 09 Apr 2023 17:42 )             46.2     04-11    137  |  102  |  76<H>  ----------------------------<  197<H>  5.4<H>   |  34<H>  |  1.54<H>    Ca    9.7      11 Apr 2023 08:07  Mg     2.8     04-11    TPro  7.1  /  Alb  3.3  /  TBili  0.4  /  DBili  x   /  AST  37  /  ALT  35  /  AlkPhos  88  04-11      10 Apr 2023 08:24    136    |  102    |  65     ----------------------------<  178    5.1     |  30     |  1.21   10 Apr 2023 04:00    x      |  x      |  x      ----------------------------<  180    x       |  x      |  x      09 Apr 2023 17:42    139    |  102    |  59     ----------------------------<  148    5.5     |  34     |  1.18     Ca    9.2        10 Apr 2023 08:24  Ca    9.4        09 Apr 2023 17:42  Mg     2.7       10 Apr 2023 08:24  Mg     2.5       09 Apr 2023 17:42    TPro  7.1    /  Alb  3.4    /  TBili  0.6    /  DBili  x      /  AST  51     /  ALT  40     /  AlkPhos  100    10 Apr 2023 08:24  TPro  7.6    /  Alb  3.7    /  TBili  0.9    /  DBili  x      /  AST  67     /  ALT  40     /  AlkPhos  115    09 Apr 2023 17:42    Radiology/Echocardiogram    < from: Xray Abdomen 2 Views (04.10.23 @ 03:43) >  IMPRESSION:  1. Technically limited study due to both motion and patient's body   habitus.  2. Atelectasis in the right middle and lower lobe of the right lung is   present.  3. Nonspecific mildly dilated centrally located small bowel loops and   partial aeration of the proximal left colon.  4. Chronic degenerative changes of the lumbar spine and right greater   than left hips. 5. AVN of the right hip cannot be excluded from the study.    < end of copied text >          -------------------------------------------------------------------------------------------  < from: TTE Echo Complete w/ Contrast w/ Doppler (03.10.23 @ 10:02) >     Impression     Summary     Endocardium is not well visualized, however, overall left ventricular   systolic function appears normal. Technically Difficult Study.   Septal flattening is seen; this finding is consistent with right heart   pressure / volume overload.   Estimated left ventricular ejection fraction is 55-60 %.   Normal appearing left atrium.   The right atrium appears moderately dilated.   The right ventricle is severely dilated.   The right ventricular apex appears hypokinetic.   The aortic valve is trileaflet with thin pliable leaflets.   Moderate mitral annular calcification is present.   There is calcification of mitral valve leaflets. The leaflet opening is   normal.   EA reversal of the mitral inflow consistent with reduced compliance of   the   left ventricle.   Trace mitral regurgitation is present.   The tricuspid valve leaflets are thin and pliable; valve motion is   normal.   Moderate (2+) tricuspid valve regurgitation is present.   Severe pulmonary hypertension.   Pulmonic valve not well seen.   No evidence of pericardial effusion.   No evidence of pleural effusion.   IVC is dilated and not collapsing with inspiration.     Signature     ----------------------------------------------------------------   Electronically signed by Lyssa Horta MD(Interpreting   physician) on 03/10/2023 10:44 AM   ----------------------------------------------------------------    < end of copied text >

## 2023-04-12 NOTE — CONSULT NOTE ADULT - ASSESSMENT
69 y/o F with HFpEF PMH HTN, HLD, COPD (on 3L O2 PRN), pHTN, LBBB, DM, who presented to  ED on 4/9 for dyspnea, LE edema, and 20 lb weight gain.  She was recently admitted for similar symptoms and discharged on 3/14, she initially felt well at discharge with her weight of 271 lbs. Her weight increased over the next few weeks, peaking at 294.  She also reported worsening dyspnea and abdominal swelling.  on 4/9, she was much more fatigued and noticed her SPO2 in the 70s.  She called 911 and was brought to  ED.  She was found to have SPO2 of 53% in ED which improved to 90s on BIPAP.  his CTA was negative for PE with small R and trace L effusions, K was 5.5, BNP 8970 viral panel was negative.  She was given nebulizer and IV lasix with some improvement        TTE 4/10/23:    AV Velocity:197 cm/s                MV Peak E-Wave: 89.7 cm/s   AV Peak Gradient: 15.52 mmHg        MV Peak A-Wave: 126 cm/s                                       MV E/A Ratio: 0.71 %   TR Velocity:371 cm/s                MV Peak Gradient: 3.22 mmHg   TR Gradient:55.0564 mmHg   Estimated RAP:15 mmHg               PV Peak Velocity: 124 cm/s   RVSP:70 mmHg                        PV Peak Gradient: 6.15 mmHg     Findings     Mitral Valve   Moderate mitral annular calcification is present.   There is calcification of mitral valve leaflets. The leaflet opening is   normal.   EA reversal of the mitral inflow consistent with reduced compliance of   the   left ventricle.   Trace mitral regurgitation is present.     Aortic Valve   The aortic valve is trileaflet with thin pliable leaflets.     Tricuspid Valve   The tricuspid valve leaflets are thin and pliable; valve motion is   normal.   Moderate (2+) tricuspid valve regurgitation is present.   Severe pulmonary hypertension.     Pulmonic Valve   Pulmonic valve not well seen.     Left Atrium   Normal appearing left atrium.     Left Ventricle   Endocardium is not well visualized, however, overall left ventricular   systolic function appears normal. Technically Difficult Study.   Septal flattening is seen; this finding is consistent with right heart   pressure / volume overload.   Estimated left ventricular ejection fraction is 55-60 %.     Right Atrium   The right atrium appears moderately dilated.     Right Ventricle   The right ventricle is severely dilated.   The right ventricular apex appears hypokinetic.     Pericardial Effusion   No evidence of pericardial effusion.     Pleural Effusion   No evidence of pleural effusion.     Miscellaneous   IVC is dilated and not collapsing with inspiration.     Impression     Summary     Endocardium is not well visualized, however, overall left ventricular   systolic function appears normal. Technically Difficult Study.   Septal flattening is seen; this finding is consistent with right heart   pressure / volume overload.   Estimated left ventricular ejection fraction is 55-60 %.   Normal appearing left atrium.   The right atrium appears moderately dilated.   The right ventricle is severely dilated.   The right ventricular apex appears hypokinetic.   The aortic valve is trileaflet with thin pliable leaflets.   Moderate mitral annular calcification is present.   There is calcification of mitral valve leaflets. The leaflet opening is   normal.   EA reversal of the mitral inflow consistent with reduced compliance of   the   left ventricle.   Trace mitral regurgitation is present.   The tricuspid valve leaflets are thin and pliable; valve motion is   normal.   Moderate (2+) tricuspid valve regurgitation is present.   Severe pulmonary hypertension.   Pulmonic valve not well seen.   No evidence of pericardial effusion.   No evidence of pleural effusion.   IVC is dilated and not collapsing with inspiration.   69 y/o F with HFpEF PMH HTN, HLD, COPD (on 3L O2 PRN), pHTN, LBBB, DM, who presented to  ED on 4/9 for dyspnea, LE edema, and weight gain.  She was recently admitted for similar symptoms and discharged on 3/14, she initially felt well at discharge with her weight of 271 lbs. Her weight increased over the next few weeks, peaking at 294.  She also reported worsening dyspnea and abdominal swelling.  on 4/9, she was much more fatigued and noticed her SPO2 in the 70s.  She called 911 and was brought to  ED.  She was found to have SPO2 of 53% in ED which improved to 90s on BIPAP.  his CTA was negative for PE with small R and trace L effusions, K was 5.5, BNP 8970 viral panel was negative.  She was given nebulizer and IV lasix with some improvement        TTE 4/10/23:    AV Velocity:197 cm/s                MV Peak E-Wave: 89.7 cm/s   AV Peak Gradient: 15.52 mmHg        MV Peak A-Wave: 126 cm/s                                       MV E/A Ratio: 0.71 %   TR Velocity:371 cm/s                MV Peak Gradient: 3.22 mmHg   TR Gradient:55.0564 mmHg   Estimated RAP:15 mmHg               PV Peak Velocity: 124 cm/s   RVSP:70 mmHg                        PV Peak Gradient: 6.15 mmHg     Findings     Mitral Valve   Moderate mitral annular calcification is present.   There is calcification of mitral valve leaflets. The leaflet opening is   normal.   EA reversal of the mitral inflow consistent with reduced compliance of   the   left ventricle.   Trace mitral regurgitation is present.     Aortic Valve   The aortic valve is trileaflet with thin pliable leaflets.     Tricuspid Valve   The tricuspid valve leaflets are thin and pliable; valve motion is   normal.   Moderate (2+) tricuspid valve regurgitation is present.   Severe pulmonary hypertension.     Pulmonic Valve   Pulmonic valve not well seen.     Left Atrium   Normal appearing left atrium.     Left Ventricle   Endocardium is not well visualized, however, overall left ventricular   systolic function appears normal. Technically Difficult Study.   Septal flattening is seen; this finding is consistent with right heart   pressure / volume overload.   Estimated left ventricular ejection fraction is 55-60 %.     Right Atrium   The right atrium appears moderately dilated.     Right Ventricle   The right ventricle is severely dilated.   The right ventricular apex appears hypokinetic.     Pericardial Effusion   No evidence of pericardial effusion.     Pleural Effusion   No evidence of pleural effusion.     Miscellaneous   IVC is dilated and not collapsing with inspiration.     Impression     Summary     Endocardium is not well visualized, however, overall left ventricular   systolic function appears normal. Technically Difficult Study.   Septal flattening is seen; this finding is consistent with right heart   pressure / volume overload.   Estimated left ventricular ejection fraction is 55-60 %.   Normal appearing left atrium.   The right atrium appears moderately dilated.   The right ventricle is severely dilated.   The right ventricular apex appears hypokinetic.   The aortic valve is trileaflet with thin pliable leaflets.   Moderate mitral annular calcification is present.   There is calcification of mitral valve leaflets. The leaflet opening is   normal.   EA reversal of the mitral inflow consistent with reduced compliance of   the   left ventricle.   Trace mitral regurgitation is present.   The tricuspid valve leaflets are thin and pliable; valve motion is   normal.   Moderate (2+) tricuspid valve regurgitation is present.   Severe pulmonary hypertension.   Pulmonic valve not well seen.   No evidence of pericardial effusion.   No evidence of pleural effusion.   IVC is dilated and not collapsing with inspiration.   67 y/o F with HFpEF PMH HTN, HLD, COPD (on 3L O2 PRN), pHTN, LBBB, DM, who presented to  ED on 4/9 for dyspnea, LE edema, and weight gain.  She was recently admitted for similar symptoms and discharged on 3/14, she appears near euvolemic on exam with low normal BP            TTE 4/10/23: LVEF 55-60%, septal flattening noted,  LA Normal, RV severely dilated with RV apex appearing hypokinetic, RA moderately dilated,  trace MR, moderate TR, RVSP 70, IVC dilated

## 2023-04-12 NOTE — PROGRESS NOTE ADULT - SUBJECTIVE AND OBJECTIVE BOX
Chief Complaint: Patient is a 68y old  Female who presents with a chief complaint of Low SpO2 (10 Apr 2023 11:42)      Interval events:   - VSS with O2 >95% on 50% venti mask , improving  - Reports continued SOB/LE edema slowly improving, constipation , no other current complaints   - Cr/BUN rising, BP soft, Lasix changed to PO BID, HCTZ d/c'd per cardiology    ROS:   Patient denies any current chest pain, cough, f/c/n/v/d, abd pain, myalgias, dysuria, HA, dizziness  All other review of systems is negative unless indicated above    Physical Exam:  Vital Signs Last 24 Hrs  T(C): 36.7 (11 Apr 2023 16:14), Max: 36.7 (10 Apr 2023 22:16)  T(F): 98.1 (11 Apr 2023 16:14), Max: 98.1 (11 Apr 2023 16:14)  HR: 97 (11 Apr 2023 16:14) (75 - 97)  BP: 96/48 (11 Apr 2023 16:14) (96/48 - 109/52)  BP(mean): --  RR: 19 (11 Apr 2023 16:14) (19 - 20)  SpO2: 93% (11 Apr 2023 16:14) (91% - 95%)    Parameters below as of 11 Apr 2023 16:14  Patient On (Oxygen Delivery Method): mask, Venturi    Constitutional: NAD, awake and alert, obese female   HEENT: PERRLA, EOMI, MMM  Respiratory: dec BS at bases / expiratory wheeze improving  Cardiovascular: S1 and S2, RRR, no murmurs, gallops or rubs  Gastrointestinal: +BS, soft, non-tender, non-distended, no CVA tenderness  Extremities: +LE edema b/l, +DP pulses b/l  Neurological: A&O x 3, no focal deficits  Musculoskeletal: 5/5 strength b/l upper and lower extremities  Skin: Normal, skin warm and dry    Labs:  04-12-23 @ 07:47  Glucose 198 [70 - 99]  CO2 total 38 [22 - 31]  Chloride 101 [96 - 108]  Sodium 141 [135 - 145]  Potassium 4.7 [3.5 - 5.3]  Calcium 9.4 [8.5 - 10.1]  Creatinine 1.58 [0.50 - 1.30]  BUN 80 [7 - 23]  eGFR 35  Anion gap 2 [5 - 17]  AST 25 [15 - 37]  ALT 34 [12 - 78]  Alk phos 77 [40 - 120]  Albumin 3.2 [3.3 - 5.0]    WBC 6.97 [3.80 - 10.50]  Hemoglobin 12.2 [11.5 - 15.5]  Hematocrit 44.7 [34.5 - 45.0]  Platelets 265 [150 - 400]      04-11-23 @ 08:07  Glucose 197 [70 - 99]  CO2 total 34 [22 - 31]  Chloride 102 [96 - 108]  Sodium 137 [135 - 145]  Potassium 5.4 [3.5 - 5.3]  Calcium 9.7 [8.5 - 10.1]  Creatinine 1.54 [0.50 - 1.30]  BUN 76 [7 - 23]  eGFR 36  Anion gap 1 [5 - 17]  AST 37 [15 - 37]  ALT 35 [12 - 78]  Alk phos 88 [40 - 120]  Albumin 3.3 [3.3 - 5.0]    WBC 6.84 [3.80 - 10.50]  Hemoglobin 12.4 [11.5 - 15.5]  Hematocrit 43.8 [34.5 - 45.0]  Platelets 243 [150 - 400]      04-10 @ 08:24  Glucose 178 mg/dL  HCO3 30 mmol/L  Chloride 102 mmol/L  Sodium 136 mmol/L>   Potassium 5.1 mmol/L  Creatinine 1.21 mg/dL  Calcium 9.2 mg/dL  BUN 65 mg/dL  eGFR 48 mL/min/1.73m2  Anion gap 4 mmol/L    WBC 4.07  Hemoglobin 12.2  Hemoatocrit 42.6  Platelet count 299          Cardiac testing:  Troponin I, High Sensitivity Result: 36.31 ng/L (04-09-23 @ 21:05)  Troponin I, High Sensitivity Result: 34.35 ng/L (04-09-23 @ 17:42)        12 Lead ECG:   Ventricular Rate 86 BPM    Atrial Rate 86 BPM    P-R Interval 162 ms    QRS Duration 126 ms    Q-T Interval 406 ms    QTC Calculation(Bazett) 485 ms    P Axis 65 degrees    R Axis 230 degrees    T Axis 86 degrees    Diagnosis Line Sinus rhythm withfrequent Premature ventricular complexes  Indeterminate axis  Intraventricular conduction block  Possible Anterolateral infarct (cited on or before 16-FEB-2016)  Abnormal ECG  When compared with ECG of 10-MAR-2023 07:38,  Premature ventricular complexes are now Present  QRS axis Shifted left  Confirmed by CORNELIUS MELENDEZ ([id number]) on 4/10/2023 4:54:07 PM (04-09-23 @ 17:49)      TTE Echo Complete w/ Contrast w/ Doppler:   PROCEDURE DATE:  03/10/2023      INTERPRETATION:  Transthoracic Echocardiography Report (TTE)   Impression     Summary     Endocardium is not well visualized, however, overall left ventricular   systolic function appears normal. Technically Difficult Study.   Septal flattening is seen; this finding is consistent with right heart   pressure / volume overload.   Estimated left ventricular ejection fraction is 55-60 %.   Normal appearing left atrium.   The right atriumappears moderately dilated.   The right ventricle is severely dilated.   The right ventricular apex appears hypokinetic.   The aortic valve is trileaflet with thin pliable leaflets.   Moderate mitral annular calcification is present.   There is calcification of mitral valve leaflets. The leaflet opening is   normal.   EA reversal of the mitral inflow consistent with reduced compliance of   the   left ventricle.   Trace mitral regurgitation is present.   The tricuspid valve leaflets are thin and pliable; valve motion is   normal.   Moderate (2+) tricuspid valve regurgitation is present.   Severe pulmonary hypertension.   Pulmonic valve not well seen.   No evidence of pericardial effusion.   No evidence of pleural effusion.   IVC is dilated andnot collapsing with inspiration.     Signature     ----------------------------------------------------------------   Electronically signed by Lyssa Horta MD(Interpreting   physician) on 03/10/2023 10:44 AM   ----------------------------------------------------------------      Radiology:  < from: CT Angio Chest PE Protocol w/ IV Cont (04.09.23 @ 21:49) >    IMPRESSION:  No pulmonary embolus.    Small right and trace left pleural effusions with adjacent compressive   atelectasis.    Small volume upper abdominal ascites.    < end of copied text >      Medications:  MEDICATIONS  (STANDING):  albuterol    90 MICROgram(s) HFA Inhaler 2 Puff(s) Inhalation every 4 hours  aspirin enteric coated 81 milliGRAM(s) Oral daily  atorvastatin 20 milliGRAM(s) Oral at bedtime  budesonide 160 MICROgram(s)/formoterol 4.5 MICROgram(s) Inhaler 2 Puff(s) Inhalation two times a day  cholecalciferol 1000 Unit(s) Oral daily  cyanocobalamin 1000 MICROGram(s) Oral daily  dextrose 5%. 1000 milliLiter(s) (50 mL/Hr) IV Continuous <Continuous>  dextrose 5%. 1000 milliLiter(s) (100 mL/Hr) IV Continuous <Continuous>  dextrose 50% Injectable 25 Gram(s) IV Push once  dextrose 50% Injectable 12.5 Gram(s) IV Push once  dextrose 50% Injectable 25 Gram(s) IV Push once  furosemide   Injectable 40 milliGRAM(s) IV Push two times a day  glucagon  Injectable 1 milliGRAM(s) IntraMuscular once  heparin   Injectable 5000 Unit(s) SubCutaneous every 12 hours  hydrochlorothiazide 12.5 milliGRAM(s) Oral daily  insulin glargine Injectable (LANTUS) 20 Unit(s) SubCutaneous at bedtime  insulin lispro (ADMELOG) corrective regimen sliding scale   SubCutaneous three times a day before meals  insulin lispro (ADMELOG) corrective regimen sliding scale   SubCutaneous at bedtime  insulin lispro Injectable (ADMELOG) 6 Unit(s) SubCutaneous three times a day before meals  losartan 100 milliGRAM(s) Oral daily  magnesium oxide 400 milliGRAM(s) Oral daily  methylPREDNISolone sodium succinate Injectable 40 milliGRAM(s) IV Push every 6 hours  metoprolol succinate ER 50 milliGRAM(s) Oral daily  polyethylene glycol 3350 17 Gram(s) Oral daily  senna 2 Tablet(s) Oral at bedtime  tiotropium 2.5 MICROgram(s) Inhaler 2 Puff(s) Inhalation daily    MEDICATIONS  (PRN):  acetaminophen     Tablet .. 650 milliGRAM(s) Oral every 6 hours PRN Temp greater or equal to 38C (100.4F), Mild Pain (1 - 3)  aluminum hydroxide/magnesium hydroxide/simethicone Suspension 30 milliLiter(s) Oral every 4 hours PRN Dyspepsia  dextrose Oral Gel 15 Gram(s) Oral once PRN Blood Glucose LESS THAN 70 milliGRAM(s)/deciliter  melatonin 3 milliGRAM(s) Oral at bedtime PRN Insomnia  ondansetron Injectable 4 milliGRAM(s) IV Push every 8 hours PRN Nausea and/or Vomiting

## 2023-04-12 NOTE — PROGRESS NOTE ADULT - PROBLEM SELECTOR PLAN 1
·  Problem: Acute on chronic diastolic congestive heart failure.   ·  Recommendation: pt; with known HFpEF p/w decreased O2 Sat at home, likely multifactorial  due to acute on chronic HFpEF exacerbation, severe pHTN,, COPD exacerbation and CARRINGTON/obesity; elevated BNP 8970 - similar to previous admission in March 2023.  Echo from 3/23 shows preserved LVEF 55-60%, with severely dilated RV, severe pHTN and moderate TR     Pt is fluid overloaded, continue lasix 40 mg iv TID, please record daily weights, strict I&Os, fluid restriction 1.5L/day. Recheck BNP    Once more euvolemic will plan for RHC confirm she is adequately diuresed - was admitted 1 month ago w/ similar fluid overload presentation.  Pulmonology - Dr. Jolly - recommends RHC for evaluation of pulmonary pressures & vasodilator testing may also be helpful.  Discussed with patient who would like to discuss with her out patient cardiologist prior to proceeding with RHC    HF following ·  Problem: Acute on chronic diastolic congestive heart failure.   ·  Recommendation: pt; with known HFpEF p/w decreased O2 Sat at home, likely multifactorial  due to acute on chronic HFpEF exacerbation, severe pHTN,, COPD exacerbation and CARRINGTON/obesity; elevated BNP 8970 - similar to previous admission in March 2023.  Echo from 3/23 shows preserved LVEF 55-60%, with severely dilated RV, severe pHTN and moderate TR   pt. with fluid overload and soft BP - meds adjusted: change lasix to 40 mg po bid (home dose), dc HCTZ, do not restart norvasc, GDMT with BB/ARB; HF consult noted - Cardiomems outpatient with her cardiologist - Dr. Joiner    Once more euvolemic will plan for RHC confirm she is adequately diuresed - was admitted 1 month ago w/ similar fluid overload presentation.  Pulmonology - Dr. Jolly - recommends RHC for evaluation of pulmonary pressures & vasodilator testing may also be helpful.  Discussed with patient who would like to discuss with her out patient cardiologist prior to proceeding with RHC

## 2023-04-13 LAB
ANION GAP SERPL CALC-SCNC: 1 MMOL/L — LOW (ref 5–17)
BUN SERPL-MCNC: 102 MG/DL — HIGH (ref 7–23)
CALCIUM SERPL-MCNC: 9.4 MG/DL — SIGNIFICANT CHANGE UP (ref 8.5–10.1)
CHLORIDE SERPL-SCNC: 102 MMOL/L — SIGNIFICANT CHANGE UP (ref 96–108)
CO2 SERPL-SCNC: 34 MMOL/L — HIGH (ref 22–31)
CREAT SERPL-MCNC: 1.45 MG/DL — HIGH (ref 0.5–1.3)
EGFR: 39 ML/MIN/1.73M2 — LOW
GLUCOSE BLDC GLUCOMTR-MCNC: 196 MG/DL — HIGH (ref 70–99)
GLUCOSE BLDC GLUCOMTR-MCNC: 214 MG/DL — HIGH (ref 70–99)
GLUCOSE BLDC GLUCOMTR-MCNC: 273 MG/DL — HIGH (ref 70–99)
GLUCOSE SERPL-MCNC: 228 MG/DL — HIGH (ref 70–99)
POTASSIUM SERPL-MCNC: 5 MMOL/L — SIGNIFICANT CHANGE UP (ref 3.5–5.3)
POTASSIUM SERPL-SCNC: 5 MMOL/L — SIGNIFICANT CHANGE UP (ref 3.5–5.3)
SAO2 % BLD: 47.9 % — LOW (ref 60–90)
SODIUM SERPL-SCNC: 137 MMOL/L — SIGNIFICANT CHANGE UP (ref 135–145)

## 2023-04-13 PROCEDURE — 99233 SBSQ HOSP IP/OBS HIGH 50: CPT

## 2023-04-13 PROCEDURE — 93451 RIGHT HEART CATH: CPT | Mod: 26

## 2023-04-13 RX ORDER — IPRATROPIUM/ALBUTEROL SULFATE 18-103MCG
3 AEROSOL WITH ADAPTER (GRAM) INHALATION ONCE
Refills: 0 | Status: COMPLETED | OUTPATIENT
Start: 2023-04-13 | End: 2023-04-13

## 2023-04-13 RX ADMIN — ALBUTEROL 2 PUFF(S): 90 AEROSOL, METERED ORAL at 21:18

## 2023-04-13 RX ADMIN — LOSARTAN POTASSIUM 100 MILLIGRAM(S): 100 TABLET, FILM COATED ORAL at 09:29

## 2023-04-13 RX ADMIN — Medication 50 MILLIGRAM(S): at 09:28

## 2023-04-13 RX ADMIN — Medication 1000 UNIT(S): at 09:28

## 2023-04-13 RX ADMIN — Medication 40 MILLIGRAM(S): at 17:38

## 2023-04-13 RX ADMIN — Medication 4: at 09:17

## 2023-04-13 RX ADMIN — HEPARIN SODIUM 5000 UNIT(S): 5000 INJECTION INTRAVENOUS; SUBCUTANEOUS at 22:10

## 2023-04-13 RX ADMIN — Medication 40 MILLIGRAM(S): at 22:16

## 2023-04-13 RX ADMIN — POLYETHYLENE GLYCOL 3350 17 GRAM(S): 17 POWDER, FOR SOLUTION ORAL at 09:30

## 2023-04-13 RX ADMIN — SENNA PLUS 2 TABLET(S): 8.6 TABLET ORAL at 22:11

## 2023-04-13 RX ADMIN — Medication 6 UNIT(S): at 17:37

## 2023-04-13 RX ADMIN — ALBUTEROL 2 PUFF(S): 90 AEROSOL, METERED ORAL at 16:52

## 2023-04-13 RX ADMIN — BUDESONIDE AND FORMOTEROL FUMARATE DIHYDRATE 2 PUFF(S): 160; 4.5 AEROSOL RESPIRATORY (INHALATION) at 09:54

## 2023-04-13 RX ADMIN — INSULIN GLARGINE 20 UNIT(S): 100 INJECTION, SOLUTION SUBCUTANEOUS at 22:10

## 2023-04-13 RX ADMIN — Medication 2: at 22:10

## 2023-04-13 RX ADMIN — MAGNESIUM OXIDE 400 MG ORAL TABLET 400 MILLIGRAM(S): 241.3 TABLET ORAL at 09:27

## 2023-04-13 RX ADMIN — ATORVASTATIN CALCIUM 20 MILLIGRAM(S): 80 TABLET, FILM COATED ORAL at 22:11

## 2023-04-13 RX ADMIN — ALBUTEROL 2 PUFF(S): 90 AEROSOL, METERED ORAL at 09:53

## 2023-04-13 RX ADMIN — PREGABALIN 1000 MICROGRAM(S): 225 CAPSULE ORAL at 09:28

## 2023-04-13 RX ADMIN — TIOTROPIUM BROMIDE 2 PUFF(S): 18 CAPSULE ORAL; RESPIRATORY (INHALATION) at 09:53

## 2023-04-13 RX ADMIN — Medication 2: at 17:37

## 2023-04-13 RX ADMIN — Medication 6 UNIT(S): at 09:17

## 2023-04-13 RX ADMIN — Medication 81 MILLIGRAM(S): at 09:27

## 2023-04-13 RX ADMIN — Medication 40 MILLIGRAM(S): at 13:16

## 2023-04-13 RX ADMIN — Medication 40 MILLIGRAM(S): at 05:42

## 2023-04-13 RX ADMIN — Medication 3 MILLILITER(S): at 13:16

## 2023-04-13 RX ADMIN — BUDESONIDE AND FORMOTEROL FUMARATE DIHYDRATE 2 PUFF(S): 160; 4.5 AEROSOL RESPIRATORY (INHALATION) at 21:18

## 2023-04-13 NOTE — PROGRESS NOTE ADULT - SUBJECTIVE AND OBJECTIVE BOX
Chief Complaint: Patient is a 68y old  Female who presents with a chief complaint of Low SpO2 (10 Apr 2023 11:42)      Interval events:   - Pt went for CardioMEMS today, procedure aborted 2/2 inability to tolerate procedure table 2/2 increase back and shoulder pain, patient moving on table and contaminating sterility, device not placed  - IV solumedrol decreased to q8h     ROS:   Patient denies any current chest pain, cough, f/c/n/v/d, abd pain, myalgias, dysuria, HA, dizziness  All other review of systems is negative unless indicated above    Physical Exam:  Vital Signs Last 24 Hrs  T(C): 36.9 (13 Apr 2023 10:40), Max: 37 (13 Apr 2023 09:11)  T(F): 98.5 (13 Apr 2023 10:40), Max: 98.6 (13 Apr 2023 09:11)  HR: 73 (13 Apr 2023 17:01) (64 - 78)  BP: 121/71 (13 Apr 2023 16:10) (95/62 - 148/68)  BP(mean): --  RR: 22 (13 Apr 2023 16:10) (18 - 23)  SpO2: 93% (13 Apr 2023 13:05) (85% - 96%)    Parameters below as of 13 Apr 2023 16:10  Patient On (Oxygen Delivery Method): mask, Venturi  O2 Flow (L/min): 40    Constitutional: NAD, awake and alert, obese female   HEENT: PERRLA, EOMI, MMM  Respiratory: dec BS at bases / expiratory wheeze improving  Cardiovascular: S1 and S2, RRR, no murmurs, gallops or rubs  Gastrointestinal: +BS, soft, non-tender, non-distended, no CVA tenderness  Extremities: +LE edema b/l, +DP pulses b/l  Neurological: A&O x 3, no focal deficits  Musculoskeletal: 5/5 strength b/l upper and lower extremities  Skin: Normal, skin warm and dry    Labs:  04-13-23 @ 08:19  Glucose 228 [70 - 99]  CO2 total 34 [22 - 31]  Chloride 102 [96 - 108]  Sodium 137 [135 - 145]  Potassium 5.0 [3.5 - 5.3]  Calcium 9.4 [8.5 - 10.1]  Creatinine 1.45 [0.50 - 1.30]   [7 - 23]  eGFR 39  Anion gap 1 [5 - 17]  AST --  ALT --  Alk phos --  Albumin --    04-12-23 @ 07:47  Glucose 198 [70 - 99]  CO2 total 38 [22 - 31]  Chloride 101 [96 - 108]  Sodium 141 [135 - 145]  Potassium 4.7 [3.5 - 5.3]  Calcium 9.4 [8.5 - 10.1]  Creatinine 1.58 [0.50 - 1.30]  BUN 80 [7 - 23]  eGFR 35  Anion gap 2 [5 - 17]  AST 25 [15 - 37]  ALT 34 [12 - 78]  Alk phos 77 [40 - 120]  Albumin 3.2 [3.3 - 5.0]    WBC 6.97 [3.80 - 10.50]  Hemoglobin 12.2 [11.5 - 15.5]  Hematocrit 44.7 [34.5 - 45.0]  Platelets 265 [150 - 400]      04-11-23 @ 08:07  Glucose 197 [70 - 99]  CO2 total 34 [22 - 31]  Chloride 102 [96 - 108]  Sodium 137 [135 - 145]  Potassium 5.4 [3.5 - 5.3]  Calcium 9.7 [8.5 - 10.1]  Creatinine 1.54 [0.50 - 1.30]  BUN 76 [7 - 23]  eGFR 36  Anion gap 1 [5 - 17]  AST 37 [15 - 37]  ALT 35 [12 - 78]  Alk phos 88 [40 - 120]  Albumin 3.3 [3.3 - 5.0]    WBC 6.84 [3.80 - 10.50]  Hemoglobin 12.4 [11.5 - 15.5]  Hematocrit 43.8 [34.5 - 45.0]  Platelets 243 [150 - 400]      04-10 @ 08:24  Glucose 178 mg/dL  HCO3 30 mmol/L  Chloride 102 mmol/L  Sodium 136 mmol/L>   Potassium 5.1 mmol/L  Creatinine 1.21 mg/dL  Calcium 9.2 mg/dL  BUN 65 mg/dL  eGFR 48 mL/min/1.73m2  Anion gap 4 mmol/L    WBC 4.07  Hemoglobin 12.2  Hemoatocrit 42.6  Platelet count 299          Cardiac testing:  Troponin I, High Sensitivity Result: 36.31 ng/L (04-09-23 @ 21:05)  Troponin I, High Sensitivity Result: 34.35 ng/L (04-09-23 @ 17:42)        12 Lead ECG:   Ventricular Rate 86 BPM    Atrial Rate 86 BPM    P-R Interval 162 ms    QRS Duration 126 ms    Q-T Interval 406 ms    QTC Calculation(Bazett) 485 ms    P Axis 65 degrees    R Axis 230 degrees    T Axis 86 degrees    Diagnosis Line Sinus rhythm withfrequent Premature ventricular complexes  Indeterminate axis  Intraventricular conduction block  Possible Anterolateral infarct (cited on or before 16-FEB-2016)  Abnormal ECG  When compared with ECG of 10-MAR-2023 07:38,  Premature ventricular complexes are now Present  QRS axis Shifted left  Confirmed by CORNELIUS MELENDEZ ([id number]) on 4/10/2023 4:54:07 PM (04-09-23 @ 17:49)      TTE Echo Complete w/ Contrast w/ Doppler:   PROCEDURE DATE:  03/10/2023      INTERPRETATION:  Transthoracic Echocardiography Report (TTE)   Impression     Summary     Endocardium is not well visualized, however, overall left ventricular   systolic function appears normal. Technically Difficult Study.   Septal flattening is seen; this finding is consistent with right heart   pressure / volume overload.   Estimated left ventricular ejection fraction is 55-60 %.   Normal appearing left atrium.   The right atriumappears moderately dilated.   The right ventricle is severely dilated.   The right ventricular apex appears hypokinetic.   The aortic valve is trileaflet with thin pliable leaflets.   Moderate mitral annular calcification is present.   There is calcification of mitral valve leaflets. The leaflet opening is   normal.   EA reversal of the mitral inflow consistent with reduced compliance of   the   left ventricle.   Trace mitral regurgitation is present.   The tricuspid valve leaflets are thin and pliable; valve motion is   normal.   Moderate (2+) tricuspid valve regurgitation is present.   Severe pulmonary hypertension.   Pulmonic valve not well seen.   No evidence of pericardial effusion.   No evidence of pleural effusion.   IVC is dilated andnot collapsing with inspiration.     Signature     ----------------------------------------------------------------   Electronically signed by Lyssa Horta MD(Interpreting   physician) on 03/10/2023 10:44 AM   ----------------------------------------------------------------      Radiology:  < from: CT Angio Chest PE Protocol w/ IV Cont (04.09.23 @ 21:49) >    IMPRESSION:  No pulmonary embolus.    Small right and trace left pleural effusions with adjacent compressive   atelectasis.    Small volume upper abdominal ascites.    < end of copied text >      Medications:  MEDICATIONS  (STANDING):  albuterol    90 MICROgram(s) HFA Inhaler 2 Puff(s) Inhalation every 4 hours  aspirin enteric coated 81 milliGRAM(s) Oral daily  atorvastatin 20 milliGRAM(s) Oral at bedtime  budesonide 160 MICROgram(s)/formoterol 4.5 MICROgram(s) Inhaler 2 Puff(s) Inhalation two times a day  cholecalciferol 1000 Unit(s) Oral daily  cyanocobalamin 1000 MICROGram(s) Oral daily  dextrose 5%. 1000 milliLiter(s) (50 mL/Hr) IV Continuous <Continuous>  dextrose 5%. 1000 milliLiter(s) (100 mL/Hr) IV Continuous <Continuous>  dextrose 50% Injectable 25 Gram(s) IV Push once  dextrose 50% Injectable 12.5 Gram(s) IV Push once  dextrose 50% Injectable 25 Gram(s) IV Push once  furosemide   Injectable 40 milliGRAM(s) IV Push two times a day  glucagon  Injectable 1 milliGRAM(s) IntraMuscular once  heparin   Injectable 5000 Unit(s) SubCutaneous every 12 hours  hydrochlorothiazide 12.5 milliGRAM(s) Oral daily  insulin glargine Injectable (LANTUS) 20 Unit(s) SubCutaneous at bedtime  insulin lispro (ADMELOG) corrective regimen sliding scale   SubCutaneous three times a day before meals  insulin lispro (ADMELOG) corrective regimen sliding scale   SubCutaneous at bedtime  insulin lispro Injectable (ADMELOG) 6 Unit(s) SubCutaneous three times a day before meals  losartan 100 milliGRAM(s) Oral daily  magnesium oxide 400 milliGRAM(s) Oral daily  methylPREDNISolone sodium succinate Injectable 40 milliGRAM(s) IV Push every 6 hours  metoprolol succinate ER 50 milliGRAM(s) Oral daily  polyethylene glycol 3350 17 Gram(s) Oral daily  senna 2 Tablet(s) Oral at bedtime  tiotropium 2.5 MICROgram(s) Inhaler 2 Puff(s) Inhalation daily    MEDICATIONS  (PRN):  acetaminophen     Tablet .. 650 milliGRAM(s) Oral every 6 hours PRN Temp greater or equal to 38C (100.4F), Mild Pain (1 - 3)  aluminum hydroxide/magnesium hydroxide/simethicone Suspension 30 milliLiter(s) Oral every 4 hours PRN Dyspepsia  dextrose Oral Gel 15 Gram(s) Oral once PRN Blood Glucose LESS THAN 70 milliGRAM(s)/deciliter  melatonin 3 milliGRAM(s) Oral at bedtime PRN Insomnia  ondansetron Injectable 4 milliGRAM(s) IV Push every 8 hours PRN Nausea and/or Vomiting

## 2023-04-13 NOTE — PROGRESS NOTE ADULT - PROBLEM SELECTOR PLAN 1
- 2/2 pHTN, COPD, obesity   - for cardiomems today  - unable to appreciate any JVP elevation, weight near her recent discharge weight   - change lasix to home dose 40 mg po BID   - continue losartan 100 mg po daily    - continue Toprol 50 mg po daily    - holding spironolactone in setting of recent hyperkalemia   - avoid HCTZ in setting of FRANCIS   - avoid CCB in outpatient setting with recurrent LE edema   - Spoke to her endo about SGLT2i, can consider as an outpatient when renal function improves,

## 2023-04-13 NOTE — PROGRESS NOTE ADULT - ASSESSMENT
67 y/o F presented with low SpO2 at home      1. Acute hypoxic and hypercapnic respiratory distress likely secondary to acute CHF exacerbation, COPD exacerbation, severe pulmonary HTN, CARRINGTON and obesity hypoventilation syndrome   - Telemetry/continuous pulse ox   - SpO2 53% -> % on Bipap -> 93% on 50% venti mask   - c/w Bipap QHS and PRN   - BNP 8970, pt is overloaded on exam, CTA: b/l effusions, abd ascites   - ECHO (3/10/23): EF 55-60%, RA and RV dilation, moderate 2+ tricuspid regurgitation, severe pulmonary HTN   - s/p 40 mg IVP lasix in the ER, continue with 40 mg IVP TID  -- changed to 40mg BID   - c/w home medications: beta blocker and ARB . STOP HCTZ/ norvasc  - Strict I+Os, daily weights   - 2L H20 restriction, 2g sodium restriction      - Keep K > 4 and Mg > 2    - Albuterol Q4H standing   - c/w Spiriva and Symbicort   - s/p 125 mg of Solumedrol, c/w Solumedrol 40 mg Q8H and taper down   - Cardiology consult - Dr. Parker   - Pulmonology consult - Dr. Mauricio   - Advanced heart failure team following   - Pt went for CardioMEMS today 4/13, procedure aborted 2/2 inability to tolerate procedure table 2/2 increase back and shoulder pain, patient moving on table and contaminating sterility, device not placed     2. Hyperkalemia   - K+ 5.5 -> 5.1 -> 5.4 -> 4.7 -> 5.0, s/p lokelma, give PRN  - trend K+     3. Hyperglycemia secondary to Type 2 DM   - Glucose 148, monitor   - Ordered HbA1c - 7.3  - Carb restriction on diet   - C/w Lantus 20 units QHS, Ademelog 6 units TIDAC, moderate dose ISS ->titrate accordingly     5. Constipation   - c/w bowel regimen     6. History of COPD (on nocturnal home O2), Type 2 DM, pulmonary HTN, LBBB, HTN, HLD   - c/w home medications; verified with pt at the bedside  - Cholithiasis -> f/u with PCP outpt   - Hold Nisoldipine given lower extremity swelling     DVT ppx: Heparin 5,000 units Q12H (hold for PLT < 50,000)     Code status: DNR/DNI (Completed MOLST form with the pt at the bedside. The patient is A+Ox3 at the time of my evaluation and has full capacity. Able to make an informed decision and understands risks associated with decisions made. Pt is DNR/DNI, no feeding tube. Agreeable to IVF, IV abx, trial of Bipap. MOLST completed and placed into chart).   Emergency contact: Randy Fiore (son [phone]) or Manish Fiore (son [phone])

## 2023-04-13 NOTE — PROVIDER CONTACT NOTE (CHANGE IN STATUS NOTIFICATION) - SITUATION
Dr. Shah to the bedside. Pt. continues to be Hypoxic and Tachypneic- Patient on 50% VM at this time - sating 88-92% - As per Dr. Shah, ICU Consult will be requested - Will cont to monitor patient closely- Safety maintained

## 2023-04-13 NOTE — PROGRESS NOTE ADULT - ASSESSMENT
69 y/o F with HFpEF PMH HTN, HLD, COPD (on 3L O2 PRN), pHTN, LBBB, DM, who presented to  ED on 4/9 for dyspnea, LE edema, and weight gain.  She was recently admitted for similar symptoms and discharged on 3/14, she appears near euvolemic on exam with low normal BP      4/13:  for cardiomems today, Cr improving with BUN continuing to rise         TTE 4/10/23: LVEF 55-60%, septal flattening noted,  LA Normal, RV severely dilated with RV apex appearing hypokinetic, RA moderately dilated,  trace MR, moderate TR, RVSP 70, IVC dilated

## 2023-04-13 NOTE — PROGRESS NOTE ADULT - SUBJECTIVE AND OBJECTIVE BOX
Subjective:    Reports dyspnea unchanged, denies chest pain    Medications:  acetaminophen     Tablet .. 650 milliGRAM(s) Oral every 6 hours PRN  albuterol    90 MICROgram(s) HFA Inhaler 2 Puff(s) Inhalation every 4 hours  aluminum hydroxide/magnesium hydroxide/simethicone Suspension 30 milliLiter(s) Oral every 4 hours PRN  aspirin enteric coated 81 milliGRAM(s) Oral daily  atorvastatin 20 milliGRAM(s) Oral at bedtime  bisacodyl 5 milliGRAM(s) Oral every 12 hours PRN  budesonide 160 MICROgram(s)/formoterol 4.5 MICROgram(s) Inhaler 2 Puff(s) Inhalation two times a day  cholecalciferol 1000 Unit(s) Oral daily  cyanocobalamin 1000 MICROGram(s) Oral daily  dextrose 5%. 1000 milliLiter(s) IV Continuous <Continuous>  dextrose 5%. 1000 milliLiter(s) IV Continuous <Continuous>  dextrose 50% Injectable 25 Gram(s) IV Push once  dextrose 50% Injectable 12.5 Gram(s) IV Push once  dextrose 50% Injectable 25 Gram(s) IV Push once  dextrose Oral Gel 15 Gram(s) Oral once PRN  furosemide    Tablet 40 milliGRAM(s) Oral two times a day  glucagon  Injectable 1 milliGRAM(s) IntraMuscular once  heparin   Injectable 5000 Unit(s) SubCutaneous every 12 hours  insulin glargine Injectable (LANTUS) 20 Unit(s) SubCutaneous at bedtime  insulin lispro (ADMELOG) corrective regimen sliding scale   SubCutaneous three times a day before meals  insulin lispro (ADMELOG) corrective regimen sliding scale   SubCutaneous at bedtime  insulin lispro Injectable (ADMELOG) 6 Unit(s) SubCutaneous three times a day before meals  losartan 100 milliGRAM(s) Oral daily  magnesium oxide 400 milliGRAM(s) Oral daily  melatonin 3 milliGRAM(s) Oral at bedtime PRN  methylPREDNISolone sodium succinate Injectable 40 milliGRAM(s) IV Push every 8 hours  metoprolol succinate ER 50 milliGRAM(s) Oral daily  ondansetron Injectable 4 milliGRAM(s) IV Push every 8 hours PRN  polyethylene glycol 3350 17 Gram(s) Oral daily  senna 2 Tablet(s) Oral at bedtime  tiotropium 2.5 MICROgram(s) Inhaler 2 Puff(s) Inhalation daily      Physical Exam:    Vitals:  Vital Signs Last 24 Hours  T(C): 36.9 (04-13-23 @ 10:40), Max: 37 (23 @ 09:11)  HR: 70 (23 @ 10:40) (64 - 83)  BP: 114/45 (23 @ 10:40) (95/62 - 114/45)  RR: 23 (23 @ 10:40) (18 - 23)  SpO2: 96% (23 @ 10:40) (92% - 96%)    Weight in k.8 ( @ 06:45)    I&O's Summary    2023 07:01  -  2023 07:00  --------------------------------------------------------  IN: 0 mL / OUT: 700 mL / NET: -700 mL        Tele: Sinus 60s-80s    General: No distress. Comfortable.  HEENT: EOM intact.  Neck: Neck supple. JVP not elevated. No masses  Chest: Clear to auscultation bilaterally  CV: Normal S1 and S2. No murmurs, rub, or gallops. Radial pulses normal.  Abdomen: Soft, non-distended, non-tender  Skin: No rashes or skin breakdown  Extremities:  Warm, 1-2+ LE edema noted   Neurology: Alert and oriented times three. Sensation intact  Psych: Affect normal    Labs:                        12.2   6.97  )-----------( 265      ( 2023 07:47 )             44.7         137  |  102  |  102<H>  ----------------------------<  228<H>  5.0   |  34<H>  |  1.45<H>    Ca    9.4      2023 08:19  Mg     2.7         TPro  6.7  /  Alb  3.2<L>  /  TBili  0.4  /  DBili  x   /  AST  25  /  ALT  34  /  AlkPhos  77

## 2023-04-13 NOTE — PACU DISCHARGE NOTE - COMMENTS
Pt. sent back to [address] [place] [address] - Report given to Cindy TRIMBLE- Pt. continues on 50% VM- Safety maintained

## 2023-04-13 NOTE — PROGRESS NOTE ADULT - SUBJECTIVE AND OBJECTIVE BOX
CHIEF COMPLAINT: Patient is a 68y old  Female who presents with a chief complaint of Low SpO2 (10 Apr 2023 17:26)      HPI:  69 y/o F with PMH COPD (on 3L O2 PRN), Type 2 DM, pulmonary HTN, LBBB, HTN, HLD presented with low SpO2 at home. Pt states that she did not feel that her lower extremity swelling improved sinc being home. She was in bed all day florentin and did not wake up until later in the day. She took her SpO2 which was 53% on room air. Reports lower extremity swelling, abdominal swelling, facial swelling, increased thirst. Does not feel that she snores or is fatigued during the day time. Has not had a bowel movement in a few days. Denies fevers, chills, chest pain, N/V, diarrhea.    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 Labs: neutrophils 82%, K+ 5.5, glucose 148, AST 67, BNP 8970. VBG: pH 7.26, CO2 81, HCO3 36 -> ABG: pH 7.35, CO2 61, HCO3 24. COVID, influenza A/B, RSV negative. EKG: NSR with HR 86 bpm, PVCs,  ms (personally reviewed). CTA: No pulmonary embolus. Small right and trace left pleural effusions with adjacent compressive atelectasis. Small volume upper abdominal ascites. Cholithiasis.     Pt was given 250 ml of NS, lasix 40 mg IVP, Ipatropium nebulizer, magnesium sulfate, 125 mg IVP Solumedrol. She is being placed on telemetry observation for further management.  (10 Apr 2023 02:51)    Cardiology consulted to evaluate for HFpEF, severe pHTN plan for RHC. Pt; was admitted 3/9-3/13/23 for HFpEF exacerb, now p/w with decreased O2 Sat at home likely due to HFpEF, COPD and severe pHTN,   at present on Venti Mask. Pt's outpatient cardiologist is Bhupendra Joiner in [place].     4/11/23: Pt sitting up in chair.  +SOB but mildly improved.  +Bilat edema L>R  Tele: sinus rhythm PVC's, APC's, vent couplets  4/12/23: feels better, Tele: NSR 70s  4/13/23: less SOB; Tele: NSR 80 no events - can dc tele, CardioMEMS today     MEDICATIONS  (STANDING):  albuterol    90 MICROgram(s) HFA Inhaler 2 Puff(s) Inhalation every 4 hours  aspirin enteric coated 81 milliGRAM(s) Oral daily  atorvastatin 20 milliGRAM(s) Oral at bedtime  budesonide 160 MICROgram(s)/formoterol 4.5 MICROgram(s) Inhaler 2 Puff(s) Inhalation two times a day  cholecalciferol 1000 Unit(s) Oral daily  cyanocobalamin 1000 MICROGram(s) Oral daily  dextrose 5%. 1000 milliLiter(s) (50 mL/Hr) IV Continuous <Continuous>  dextrose 5%. 1000 milliLiter(s) (100 mL/Hr) IV Continuous <Continuous>  dextrose 50% Injectable 25 Gram(s) IV Push once  dextrose 50% Injectable 12.5 Gram(s) IV Push once  dextrose 50% Injectable 25 Gram(s) IV Push once  furosemide    Tablet 40 milliGRAM(s) Oral two times a day  glucagon  Injectable 1 milliGRAM(s) IntraMuscular once  heparin   Injectable 5000 Unit(s) SubCutaneous every 12 hours  insulin glargine Injectable (LANTUS) 20 Unit(s) SubCutaneous at bedtime  insulin lispro (ADMELOG) corrective regimen sliding scale   SubCutaneous three times a day before meals  insulin lispro (ADMELOG) corrective regimen sliding scale   SubCutaneous at bedtime  insulin lispro Injectable (ADMELOG) 6 Unit(s) SubCutaneous three times a day before meals  losartan 100 milliGRAM(s) Oral daily  magnesium oxide 400 milliGRAM(s) Oral daily  methylPREDNISolone sodium succinate Injectable 40 milliGRAM(s) IV Push every 8 hours  metoprolol succinate ER 50 milliGRAM(s) Oral daily  polyethylene glycol 3350 17 Gram(s) Oral daily  senna 2 Tablet(s) Oral at bedtime  tiotropium 2.5 MICROgram(s) Inhaler 2 Puff(s) Inhalation daily    MEDICATIONS  (PRN):  acetaminophen     Tablet .. 650 milliGRAM(s) Oral every 6 hours PRN Temp greater or equal to 38C (100.4F), Mild Pain (1 - 3)  aluminum hydroxide/magnesium hydroxide/simethicone Suspension 30 milliLiter(s) Oral every 4 hours PRN Dyspepsia  dextrose Oral Gel 15 Gram(s) Oral once PRN Blood Glucose LESS THAN 70 milliGRAM(s)/deciliter  melatonin 3 milliGRAM(s) Oral at bedtime PRN Insomnia  ondansetron Injectable 4 milliGRAM(s) IV Push every 8 hours PRN Nausea and/or Vomiting    Vital Signs Last 24 Hrs  T(C): 37 (13 Apr 2023 09:11), Max: 37 (13 Apr 2023 09:11)  T(F): 98.6 (13 Apr 2023 09:11), Max: 98.6 (13 Apr 2023 09:11)  HR: 73 (13 Apr 2023 09:11) (64 - 83)  BP: 113/63 (13 Apr 2023 09:11) (95/62 - 113/63)  BP(mean): --  RR: 18 (13 Apr 2023 09:11) (18 - 18)  SpO2: 96% (13 Apr 2023 09:11) (92% - 96%)    Parameters below as of 13 Apr 2023 09:11  Patient On (Oxygen Delivery Method): mask, Venturi    PHYSICAL EXAM:    General: Awake and alert, No acute distress.   Skin: Warm, dry, and pink.   Eyes: Pupils equal and reactive to light. No conjunctival injection, discharge, or scleral icterus.   HEENT: Atraumatic, normocephalic.    Cardiology: Normal S1, S2. +SHELDON. Regular rate and rhythm.   Respiratory: Decreased breath sounds with exp wheeze noted    Gastrointestinal: Positive bowel sounds. Soft, non-tender, non-distended.    Extremities: 2+ peripheral edema bilaterally L>R   Neurological: A+Ox3     LABS:                          12.2   6.97  )-----------( 265      ( 12 Apr 2023 07:47 )             44.7     04-13    137  |  102  |  102<H>  ----------------------------<  228<H>  5.0   |  34<H>  |  1.45<H>    Ca    9.4      13 Apr 2023 08:19  Mg     2.7     04-12    TPro  6.7  /  Alb  3.2<L>  /  TBili  0.4  /  DBili  x   /  AST  25  /  ALT  34  /  AlkPhos  77  04-12    - TroponinI hsT: <-36.31, <-34.35                        12.4   6.84  )-----------( 243      ( 11 Apr 2023 08:07 )             43.8                           12.2   4.07  )-----------( 299      ( 10 Apr 2023 08:24 )             42.6                         13.0   4.76  )-----------( 332      ( 09 Apr 2023 17:42 )             46.2     04-11    137  |  102  |  76<H>  ----------------------------<  197<H>  5.4<H>   |  34<H>  |  1.54<H>    Ca    9.7      11 Apr 2023 08:07  Mg     2.8     04-11    TPro  7.1  /  Alb  3.3  /  TBili  0.4  /  DBili  x   /  AST  37  /  ALT  35  /  AlkPhos  88  04-11      10 Apr 2023 08:24    136    |  102    |  65     ----------------------------<  178    5.1     |  30     |  1.21   10 Apr 2023 04:00    x      |  x      |  x      ----------------------------<  180    x       |  x      |  x      09 Apr 2023 17:42    139    |  102    |  59     ----------------------------<  148    5.5     |  34     |  1.18     Ca    9.2        10 Apr 2023 08:24  Ca    9.4        09 Apr 2023 17:42  Mg     2.7       10 Apr 2023 08:24  Mg     2.5       09 Apr 2023 17:42    TPro  7.1    /  Alb  3.4    /  TBili  0.6    /  DBili  x      /  AST  51     /  ALT  40     /  AlkPhos  100    10 Apr 2023 08:24  TPro  7.6    /  Alb  3.7    /  TBili  0.9    /  DBili  x      /  AST  67     /  ALT  40     /  AlkPhos  115    09 Apr 2023 17:42    Radiology/Echocardiogram    < from: Xray Abdomen 2 Views (04.10.23 @ 03:43) >  IMPRESSION:  1. Technically limited study due to both motion and patient's body   habitus.  2. Atelectasis in the right middle and lower lobe of the right lung is   present.  3. Nonspecific mildly dilated centrally located small bowel loops and   partial aeration of the proximal left colon.  4. Chronic degenerative changes of the lumbar spine and right greater   than left hips. 5. AVN of the right hip cannot be excluded from the study.    < end of copied text >          -------------------------------------------------------------------------------------------  < from: TTE Echo Complete w/ Contrast w/ Doppler (03.10.23 @ 10:02) >     Impression     Summary     Endocardium is not well visualized, however, overall left ventricular   systolic function appears normal. Technically Difficult Study.   Septal flattening is seen; this finding is consistent with right heart   pressure / volume overload.   Estimated left ventricular ejection fraction is 55-60 %.   Normal appearing left atrium.   The right atrium appears moderately dilated.   The right ventricle is severely dilated.   The right ventricular apex appears hypokinetic.   The aortic valve is trileaflet with thin pliable leaflets.   Moderate mitral annular calcification is present.   There is calcification of mitral valve leaflets. The leaflet opening is   normal.   EA reversal of the mitral inflow consistent with reduced compliance of   the   left ventricle.   Trace mitral regurgitation is present.   The tricuspid valve leaflets are thin and pliable; valve motion is   normal.   Moderate (2+) tricuspid valve regurgitation is present.   Severe pulmonary hypertension.   Pulmonic valve not well seen.   No evidence of pericardial effusion.   No evidence of pleural effusion.   IVC is dilated and not collapsing with inspiration.     Signature     ----------------------------------------------------------------   Electronically signed by Lyssa BATESInterpreting   physician) on 03/10/2023 10:44 AM   ----------------------------------------------------------------    < end of copied text >             CHIEF COMPLAINT: CHF    HPI:  67 y/o F with PMH COPD (on 3L O2 PRN), Type 2 DM, pulmonary HTN, LBBB, HTN, HLD presented with low SpO2 at home. Pt states that she did not feel that her lower extremity swelling improved since being home. She was in bed all day florentin and did not wake up until later in the day. She took her SpO2 which was 53% on room air. Reports lower extremity swelling, abdominal swelling, facial swelling, increased thirst. Does not feel that she snores or is fatigued during the day time. Has not had a bowel movement in a few days. Denies fevers, chills, chest pain, N/V, diarrhea.    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 Labs: neutrophils 82%, K+ 5.5, glucose 148, AST 67, BNP 8970. VBG: pH 7.26, CO2 81, HCO3 36 -> ABG: pH 7.35, CO2 61, HCO3 24. COVID, influenza A/B, RSV negative. EKG: NSR with HR 86 bpm, PVCs,  ms (personally reviewed). CTA: No pulmonary embolus. Small right and trace left pleural effusions with adjacent compressive atelectasis. Small volume upper abdominal ascites. Cholithiasis.     Pt was given 250 ml of NS, lasix 40 mg IVP, Ipatropium nebulizer, magnesium sulfate, 125 mg IVP Solumedrol. She is being placed on telemetry observation for further management.  (10 Apr 2023 02:51)    Cardiology consulted to evaluate for HFpEF, severe pHTN plan for RHC. Pt; was admitted 3/9-3/13/23 for HFpEF exacerb, now p/w with decreased O2 Sat at home likely due to HFpEF, COPD and severe pHTN,   at present on Venti Mask. Pt's outpatient cardiologist is Bhupendra Joiner in [place].     4/11/23: Pt sitting up in chair.  +SOB but mildly improved.  +Bilat edema L>R  Tele: sinus rhythm PVC's, APC's, vent couplets  4/12/23: feels better, Tele: NSR 70s  4/13/23: less SOB; Tele: NSR 80 no events - can dc tele, CardioMEMS today     MEDICATIONS  (STANDING):  albuterol    90 MICROgram(s) HFA Inhaler 2 Puff(s) Inhalation every 4 hours  aspirin enteric coated 81 milliGRAM(s) Oral daily  atorvastatin 20 milliGRAM(s) Oral at bedtime  budesonide 160 MICROgram(s)/formoterol 4.5 MICROgram(s) Inhaler 2 Puff(s) Inhalation two times a day  cholecalciferol 1000 Unit(s) Oral daily  cyanocobalamin 1000 MICROGram(s) Oral daily  dextrose 5%. 1000 milliLiter(s) (50 mL/Hr) IV Continuous <Continuous>  dextrose 5%. 1000 milliLiter(s) (100 mL/Hr) IV Continuous <Continuous>  dextrose 50% Injectable 25 Gram(s) IV Push once  dextrose 50% Injectable 12.5 Gram(s) IV Push once  dextrose 50% Injectable 25 Gram(s) IV Push once  furosemide    Tablet 40 milliGRAM(s) Oral two times a day  glucagon  Injectable 1 milliGRAM(s) IntraMuscular once  heparin   Injectable 5000 Unit(s) SubCutaneous every 12 hours  insulin glargine Injectable (LANTUS) 20 Unit(s) SubCutaneous at bedtime  insulin lispro (ADMELOG) corrective regimen sliding scale   SubCutaneous three times a day before meals  insulin lispro (ADMELOG) corrective regimen sliding scale   SubCutaneous at bedtime  insulin lispro Injectable (ADMELOG) 6 Unit(s) SubCutaneous three times a day before meals  losartan 100 milliGRAM(s) Oral daily  magnesium oxide 400 milliGRAM(s) Oral daily  methylPREDNISolone sodium succinate Injectable 40 milliGRAM(s) IV Push every 8 hours  metoprolol succinate ER 50 milliGRAM(s) Oral daily  polyethylene glycol 3350 17 Gram(s) Oral daily  senna 2 Tablet(s) Oral at bedtime  tiotropium 2.5 MICROgram(s) Inhaler 2 Puff(s) Inhalation daily    MEDICATIONS  (PRN):  acetaminophen     Tablet .. 650 milliGRAM(s) Oral every 6 hours PRN Temp greater or equal to 38C (100.4F), Mild Pain (1 - 3)  aluminum hydroxide/magnesium hydroxide/simethicone Suspension 30 milliLiter(s) Oral every 4 hours PRN Dyspepsia  dextrose Oral Gel 15 Gram(s) Oral once PRN Blood Glucose LESS THAN 70 milliGRAM(s)/deciliter  melatonin 3 milliGRAM(s) Oral at bedtime PRN Insomnia  ondansetron Injectable 4 milliGRAM(s) IV Push every 8 hours PRN Nausea and/or Vomiting    Vital Signs Last 24 Hrs  T(C): 37 (13 Apr 2023 09:11), Max: 37 (13 Apr 2023 09:11)  T(F): 98.6 (13 Apr 2023 09:11), Max: 98.6 (13 Apr 2023 09:11)  HR: 73 (13 Apr 2023 09:11) (64 - 83)  BP: 113/63 (13 Apr 2023 09:11) (95/62 - 113/63)  BP(mean): --  RR: 18 (13 Apr 2023 09:11) (18 - 18)  SpO2: 96% (13 Apr 2023 09:11) (92% - 96%)    Parameters below as of 13 Apr 2023 09:11  Patient On (Oxygen Delivery Method): mask, Venturi    PHYSICAL EXAM:    General: Awake and alert, No acute distress.   Skin: Warm, dry, and pink.   Eyes: Pupils equal and reactive to light. No conjunctival injection, discharge, or scleral icterus.   HEENT: Atraumatic, normocephalic.    Cardiology: Normal S1, S2. +SHELDON. Regular rate and rhythm.   Respiratory: Decreased breath sounds with exp wheeze noted    Gastrointestinal: Positive bowel sounds. Soft, non-tender, non-distended.    Extremities: 2+ peripheral edema bilaterally L>R   Neurological: A+Ox3     LABS:                          12.2   6.97  )-----------( 265      ( 12 Apr 2023 07:47 )             44.7     04-13    137  |  102  |  102<H>  ----------------------------<  228<H>  5.0   |  34<H>  |  1.45<H>    Ca    9.4      13 Apr 2023 08:19  Mg     2.7     04-12    TPro  6.7  /  Alb  3.2<L>  /  TBili  0.4  /  DBili  x   /  AST  25  /  ALT  34  /  AlkPhos  77  04-12    - TroponinI hsT: <-36.31, <-34.35                        12.4   6.84  )-----------( 243      ( 11 Apr 2023 08:07 )             43.8                           12.2   4.07  )-----------( 299      ( 10 Apr 2023 08:24 )             42.6                         13.0   4.76  )-----------( 332      ( 09 Apr 2023 17:42 )             46.2     04-11    137  |  102  |  76<H>  ----------------------------<  197<H>  5.4<H>   |  34<H>  |  1.54<H>    Ca    9.7      11 Apr 2023 08:07  Mg     2.8     04-11    TPro  7.1  /  Alb  3.3  /  TBili  0.4  /  DBili  x   /  AST  37  /  ALT  35  /  AlkPhos  88  04-11      10 Apr 2023 08:24    136    |  102    |  65     ----------------------------<  178    5.1     |  30     |  1.21   10 Apr 2023 04:00    x      |  x      |  x      ----------------------------<  180    x       |  x      |  x      09 Apr 2023 17:42    139    |  102    |  59     ----------------------------<  148    5.5     |  34     |  1.18     Ca    9.2        10 Apr 2023 08:24  Ca    9.4        09 Apr 2023 17:42  Mg     2.7       10 Apr 2023 08:24  Mg     2.5       09 Apr 2023 17:42    TPro  7.1    /  Alb  3.4    /  TBili  0.6    /  DBili  x      /  AST  51     /  ALT  40     /  AlkPhos  100    10 Apr 2023 08:24  TPro  7.6    /  Alb  3.7    /  TBili  0.9    /  DBili  x      /  AST  67     /  ALT  40     /  AlkPhos  115    09 Apr 2023 17:42    Radiology/Echocardiogram    < from: Xray Abdomen 2 Views (04.10.23 @ 03:43) >  IMPRESSION:  1. Technically limited study due to both motion and patient's body   habitus.  2. Atelectasis in the right middle and lower lobe of the right lung is   present.  3. Nonspecific mildly dilated centrally located small bowel loops and   partial aeration of the proximal left colon.  4. Chronic degenerative changes of the lumbar spine and right greater   than left hips. 5. AVN of the right hip cannot be excluded from the study.    < end of copied text >          -------------------------------------------------------------------------------------------  < from: TTE Echo Complete w/ Contrast w/ Doppler (03.10.23 @ 10:02) >     Impression     Summary     Endocardium is not well visualized, however, overall left ventricular   systolic function appears normal. Technically Difficult Study.   Septal flattening is seen; this finding is consistent with right heart   pressure / volume overload.   Estimated left ventricular ejection fraction is 55-60 %.   Normal appearing left atrium.   The right atrium appears moderately dilated.   The right ventricle is severely dilated.   The right ventricular apex appears hypokinetic.   The aortic valve is trileaflet with thin pliable leaflets.   Moderate mitral annular calcification is present.   There is calcification of mitral valve leaflets. The leaflet opening is   normal.   EA reversal of the mitral inflow consistent with reduced compliance of   the   left ventricle.   Trace mitral regurgitation is present.   The tricuspid valve leaflets are thin and pliable; valve motion is   normal.   Moderate (2+) tricuspid valve regurgitation is present.   Severe pulmonary hypertension.   Pulmonic valve not well seen.   No evidence of pericardial effusion.   No evidence of pleural effusion.   IVC is dilated and not collapsing with inspiration.     Signature     ----------------------------------------------------------------   Electronically signed by Lyssa Horta MD(Interpreting   physician) on 03/10/2023 10:44 AM   ----------------------------------------------------------------    < end of copied text >

## 2023-04-13 NOTE — CHART NOTE - NSCHARTNOTEFT_GEN_A_CORE
Cardiac Cath Lab Nurse Practitioner :  HPI:    69 y/o F with HFpEF PMH HTN, HLD, COPD (on 3L O2 PRN), pHTN, LBBB, DM, who presented to  ED on 4/9 for dyspnea, LE edema, and weight gain.  She was recently admitted for similar symptoms and discharged on 3/14, she appears near euvolemic on exam with low normal BP      TTE 4/10/23: LVEF 55-60%, septal flattening noted,  LA Normal, RV severely dilated with RV apex appearing hypokinetic, RA moderately dilated,  trace MR, moderate TR, RVSP 70, IVC dilated     patient arrived on 50% venti mask, titrated to 5L NC maintaining o2 sat 90-93%    Patient seen and examined. No c/o symptoms other than identified in ROS    MEDICATIONS  (STANDING):  albuterol    90 MICROgram(s) HFA Inhaler 2 Puff(s) Inhalation every 4 hours  aspirin enteric coated 81 milliGRAM(s) Oral daily  atorvastatin 20 milliGRAM(s) Oral at bedtime  budesonide 160 MICROgram(s)/formoterol 4.5 MICROgram(s) Inhaler 2 Puff(s) Inhalation two times a day  cholecalciferol 1000 Unit(s) Oral daily  cyanocobalamin 1000 MICROGram(s) Oral daily  dextrose 5%. 1000 milliLiter(s) (50 mL/Hr) IV Continuous <Continuous>  dextrose 5%. 1000 milliLiter(s) (100 mL/Hr) IV Continuous <Continuous>  dextrose 50% Injectable 25 Gram(s) IV Push once  dextrose 50% Injectable 12.5 Gram(s) IV Push once  dextrose 50% Injectable 25 Gram(s) IV Push once  furosemide    Tablet 40 milliGRAM(s) Oral two times a day  glucagon  Injectable 1 milliGRAM(s) IntraMuscular once  heparin   Injectable 5000 Unit(s) SubCutaneous every 12 hours  insulin glargine Injectable (LANTUS) 20 Unit(s) SubCutaneous at bedtime  insulin lispro (ADMELOG) corrective regimen sliding scale   SubCutaneous three times a day before meals  insulin lispro (ADMELOG) corrective regimen sliding scale   SubCutaneous at bedtime  insulin lispro Injectable (ADMELOG) 6 Unit(s) SubCutaneous three times a day before meals  losartan 100 milliGRAM(s) Oral daily  magnesium oxide 400 milliGRAM(s) Oral daily  methylPREDNISolone sodium succinate Injectable 40 milliGRAM(s) IV Push every 8 hours  metoprolol succinate ER 50 milliGRAM(s) Oral daily  polyethylene glycol 3350 17 Gram(s) Oral daily  senna 2 Tablet(s) Oral at bedtime  tiotropium 2.5 MICROgram(s) Inhaler 2 Puff(s) Inhalation daily      Vital Signs Last 24 Hrs  T(C): 36.9 (13 Apr 2023 10:40), Max: 37 (13 Apr 2023 09:11)  T(F): 98.5 (13 Apr 2023 10:40), Max: 98.6 (13 Apr 2023 09:11)  HR: 70 (13 Apr 2023 10:40) (64 - 83)  BP: 114/45 (13 Apr 2023 10:40) (95/62 - 114/45)  BP(mean): --  RR: 23 (13 Apr 2023 10:40) (18 - 23)  SpO2: 96% (13 Apr 2023 10:40) (92% - 96%)    Parameters below as of 13 Apr 2023 10:40    O2 Flow (L/min): 4  O2 Concentration (%): 91    LABS:                        12.2   6.97  )-----------( 265      ( 12 Apr 2023 07:47 )             44.7     04-13    137  |  102  |  102<H>  ----------------------------<  228<H>  5.0   |  34<H>  |  1.45<H>    Ca    9.4      13 Apr 2023 08:19  Mg     2.7     04-12    TPro  6.7  /  Alb  3.2<L>  /  TBili  0.4  /  DBili  x   /  AST  25  /  ALT  34  /  AlkPhos  77  04-12            PHYSICAL EXAM  GENERAL: NAD, AAOx3  CHEST/LUNG: Clear to auscultation bilaterally; No wheeze  HEART: s1 s2 Regular rate and rhythm; No murmurs, rubs, or gallops  ABDOMEN: Soft, Nontender, Nondistended; Bowel sounds present X 4 quadrants   EXTREMITIES:  2+ Peripheral Pulses, No clubbing, cyanosis, or edema  PYSCH: normal affect and behavior, calm and cooperative     PLAN:  -Consent obtained for cardiomem placement, with possible sedation and analgia. Pt is competent, has capacity, and understands risks and benefits of procedure. Risks and benefits discussed. Risk discussed included, but not limited to MI, stroke, mortality, major bleeding, arrythmia, or infection. All questions answered Cardiac Cath Lab Nurse Practitioner :  HPI:    69 y/o F with HFpEF PMH HTN, HLD, COPD (on 3L O2 PRN), pHTN, LBBB, DM, who presented to  ED on 4/9 for dyspnea, LE edema, and weight gain.  She was recently admitted for similar symptoms and discharged on 3/14, she appears near euvolemic on exam with low normal BP      TTE 4/10/23: LVEF 55-60%, septal flattening noted,  LA Normal, RV severely dilated with RV apex appearing hypokinetic, RA moderately dilated,  trace MR, moderate TR, RVSP 70, IVC dilated     patient arrived on 50% venti mask, titrated to 5L NC maintaining o2 sat 90-93%    Patient seen and examined. No c/o symptoms other than identified in ROS    MEDICATIONS  (STANDING):  albuterol    90 MICROgram(s) HFA Inhaler 2 Puff(s) Inhalation every 4 hours  aspirin enteric coated 81 milliGRAM(s) Oral daily  atorvastatin 20 milliGRAM(s) Oral at bedtime  budesonide 160 MICROgram(s)/formoterol 4.5 MICROgram(s) Inhaler 2 Puff(s) Inhalation two times a day  cholecalciferol 1000 Unit(s) Oral daily  cyanocobalamin 1000 MICROGram(s) Oral daily  dextrose 5%. 1000 milliLiter(s) (50 mL/Hr) IV Continuous <Continuous>  dextrose 5%. 1000 milliLiter(s) (100 mL/Hr) IV Continuous <Continuous>  dextrose 50% Injectable 25 Gram(s) IV Push once  dextrose 50% Injectable 12.5 Gram(s) IV Push once  dextrose 50% Injectable 25 Gram(s) IV Push once  furosemide    Tablet 40 milliGRAM(s) Oral two times a day  glucagon  Injectable 1 milliGRAM(s) IntraMuscular once  heparin   Injectable 5000 Unit(s) SubCutaneous every 12 hours  insulin glargine Injectable (LANTUS) 20 Unit(s) SubCutaneous at bedtime  insulin lispro (ADMELOG) corrective regimen sliding scale   SubCutaneous three times a day before meals  insulin lispro (ADMELOG) corrective regimen sliding scale   SubCutaneous at bedtime  insulin lispro Injectable (ADMELOG) 6 Unit(s) SubCutaneous three times a day before meals  losartan 100 milliGRAM(s) Oral daily  magnesium oxide 400 milliGRAM(s) Oral daily  methylPREDNISolone sodium succinate Injectable 40 milliGRAM(s) IV Push every 8 hours  metoprolol succinate ER 50 milliGRAM(s) Oral daily  polyethylene glycol 3350 17 Gram(s) Oral daily  senna 2 Tablet(s) Oral at bedtime  tiotropium 2.5 MICROgram(s) Inhaler 2 Puff(s) Inhalation daily      Vital Signs Last 24 Hrs  T(C): 36.9 (13 Apr 2023 10:40), Max: 37 (13 Apr 2023 09:11)  T(F): 98.5 (13 Apr 2023 10:40), Max: 98.6 (13 Apr 2023 09:11)  HR: 70 (13 Apr 2023 10:40) (64 - 83)  BP: 114/45 (13 Apr 2023 10:40) (95/62 - 114/45)  BP(mean): --  RR: 23 (13 Apr 2023 10:40) (18 - 23)  SpO2: 96% (13 Apr 2023 10:40) (92% - 96%)    Parameters below as of 13 Apr 2023 10:40    O2 Flow (L/min): 4  O2 Concentration (%): 91    LABS:                        12.2   6.97  )-----------( 265      ( 12 Apr 2023 07:47 )             44.7     04-13    137  |  102  |  102<H>  ----------------------------<  228<H>  5.0   |  34<H>  |  1.45<H>    Ca    9.4      13 Apr 2023 08:19  Mg     2.7     04-12    TPro  6.7  /  Alb  3.2<L>  /  TBili  0.4  /  DBili  x   /  AST  25  /  ALT  34  /  AlkPhos  77  04-12            PHYSICAL EXAM  GENERAL: NAD, AAOx3  CHEST/LUNG: Clear to auscultation bilaterally; No wheeze  HEART: s1 s2 Regular rate and rhythm; No murmurs, rubs, or gallops  ABDOMEN: Soft, Nontender, Nondistended; Bowel sounds present X 4 quadrants   EXTREMITIES:  2+ Peripheral Pulses, No clubbing, cyanosis, or edema  PYSCH: normal affect and behavior, calm and cooperative     PLAN:  -Consent obtained for cardiomem placement, with possible sedation and analgia. Pt is competent, has capacity, and understands risks and benefits of procedure. Risks and benefits discussed. Risk discussed included, but not limited to MI, stroke, mortality, major bleeding, arrythmia, or infection. All questions answered  - consent to rescind DNR obtained for procedural needs

## 2023-04-13 NOTE — CHART NOTE - NSCHARTNOTEFT_GEN_A_CORE
patient was away for rhc, cardiomems during my rounds  Pulmonary will evaluate in AM  Spoke to son in patient room, he had questions about overall prognosis and we discussed that this is difficult to determine however the best that we can optimize all her comorbidities, the better for the patient

## 2023-04-13 NOTE — PROGRESS NOTE ADULT - PROBLEM SELECTOR PLAN 1
·  Problem: Acute on chronic diastolic congestive heart failure.   ·  Recommendation: pt; with known HFpEF p/w decreased O2 Sat at home, likely multifactorial  due to acute on chronic HFpEF exacerbation, severe pHTN,, COPD exacerbation and CARRINGTON/obesity; elevated BNP 8970 - similar to previous admission in March 2023.  Echo from 3/23 shows preserved LVEF 55-60%, with severely dilated RV, severe pHTN and moderate TR   pt. with fluid overload and soft BP - meds adjusted: change lasix to 40 mg po bid (home dose), dc HCTZ, do not restart norvasc, GDMT with BB/ARB; HF consult noted - Cardiomems today 4/13    Once more euvolemic will plan for RHC confirm she is adequately diuresed - was admitted 1 month ago w/ similar fluid overload presentation.  Pulmonology - Dr. Jolly - recommends RHC for evaluation of pulmonary pressures & vasodilator testing may also be helpful.  Discussed with patient who would like to discuss with her out patient cardiologist prior to proceeding with RHC

## 2023-04-14 LAB
ANION GAP SERPL CALC-SCNC: 3 MMOL/L — LOW (ref 5–17)
BUN SERPL-MCNC: 78 MG/DL — HIGH (ref 7–23)
BUN SERPL-MCNC: 88 MG/DL — HIGH (ref 7–23)
CALCIUM SERPL-MCNC: 9.5 MG/DL — SIGNIFICANT CHANGE UP (ref 8.5–10.1)
CALCIUM SERPL-MCNC: 9.6 MG/DL — SIGNIFICANT CHANGE UP (ref 8.5–10.1)
CHLORIDE SERPL-SCNC: 101 MMOL/L — SIGNIFICANT CHANGE UP (ref 96–108)
CHLORIDE SERPL-SCNC: 103 MMOL/L — SIGNIFICANT CHANGE UP (ref 96–108)
CO2 SERPL-SCNC: 38 MMOL/L — HIGH (ref 22–31)
CO2 SERPL-SCNC: 39 MMOL/L — HIGH (ref 22–31)
CREAT SERPL-MCNC: 1.11 MG/DL — SIGNIFICANT CHANGE UP (ref 0.5–1.3)
CREAT SERPL-MCNC: 1.19 MG/DL — SIGNIFICANT CHANGE UP (ref 0.5–1.3)
EGFR: 49 ML/MIN/1.73M2 — LOW
EGFR: 53 ML/MIN/1.73M2 — LOW
GLUCOSE BLDC GLUCOMTR-MCNC: 172 MG/DL — HIGH (ref 70–99)
GLUCOSE BLDC GLUCOMTR-MCNC: 221 MG/DL — HIGH (ref 70–99)
GLUCOSE BLDC GLUCOMTR-MCNC: 283 MG/DL — HIGH (ref 70–99)
GLUCOSE BLDC GLUCOMTR-MCNC: 437 MG/DL — HIGH (ref 70–99)
GLUCOSE SERPL-MCNC: 127 MG/DL — HIGH (ref 70–99)
GLUCOSE SERPL-MCNC: 222 MG/DL — HIGH (ref 70–99)
HCT VFR BLD CALC: 43.1 % — SIGNIFICANT CHANGE UP (ref 34.5–45)
HGB BLD-MCNC: 11.9 G/DL — SIGNIFICANT CHANGE UP (ref 11.5–15.5)
MCHC RBC-ENTMCNC: 24.8 PG — LOW (ref 27–34)
MCHC RBC-ENTMCNC: 27.6 GM/DL — LOW (ref 32–36)
MCV RBC AUTO: 89.8 FL — SIGNIFICANT CHANGE UP (ref 80–100)
PLATELET # BLD AUTO: 213 K/UL — SIGNIFICANT CHANGE UP (ref 150–400)
POTASSIUM SERPL-MCNC: 4.1 MMOL/L — SIGNIFICANT CHANGE UP (ref 3.5–5.3)
POTASSIUM SERPL-MCNC: 4.8 MMOL/L — SIGNIFICANT CHANGE UP (ref 3.5–5.3)
POTASSIUM SERPL-SCNC: 4.1 MMOL/L — SIGNIFICANT CHANGE UP (ref 3.5–5.3)
POTASSIUM SERPL-SCNC: 4.8 MMOL/L — SIGNIFICANT CHANGE UP (ref 3.5–5.3)
RBC # BLD: 4.8 M/UL — SIGNIFICANT CHANGE UP (ref 3.8–5.2)
RBC # FLD: 17.5 % — HIGH (ref 10.3–14.5)
SODIUM SERPL-SCNC: 141 MMOL/L — SIGNIFICANT CHANGE UP (ref 135–145)
SODIUM SERPL-SCNC: 143 MMOL/L — SIGNIFICANT CHANGE UP (ref 135–145)
WBC # BLD: 6.57 K/UL — SIGNIFICANT CHANGE UP (ref 3.8–10.5)
WBC # FLD AUTO: 6.57 K/UL — SIGNIFICANT CHANGE UP (ref 3.8–10.5)

## 2023-04-14 PROCEDURE — 99233 SBSQ HOSP IP/OBS HIGH 50: CPT

## 2023-04-14 PROCEDURE — 99497 ADVNCD CARE PLAN 30 MIN: CPT | Mod: 25

## 2023-04-14 PROCEDURE — 99222 1ST HOSP IP/OBS MODERATE 55: CPT

## 2023-04-14 RX ORDER — FUROSEMIDE 40 MG
80 TABLET ORAL ONCE
Refills: 0 | Status: COMPLETED | OUTPATIENT
Start: 2023-04-14 | End: 2023-04-14

## 2023-04-14 RX ORDER — NYSTATIN CREAM 100000 [USP'U]/G
1 CREAM TOPICAL
Refills: 0 | Status: DISCONTINUED | OUTPATIENT
Start: 2023-04-14 | End: 2023-04-18

## 2023-04-14 RX ORDER — FUROSEMIDE 40 MG
10 TABLET ORAL
Qty: 500 | Refills: 0 | Status: DISCONTINUED | OUTPATIENT
Start: 2023-04-14 | End: 2023-04-15

## 2023-04-14 RX ADMIN — Medication 6 UNIT(S): at 12:59

## 2023-04-14 RX ADMIN — Medication 40 MILLIGRAM(S): at 21:22

## 2023-04-14 RX ADMIN — Medication 5 MG/HR: at 17:28

## 2023-04-14 RX ADMIN — MAGNESIUM OXIDE 400 MG ORAL TABLET 400 MILLIGRAM(S): 241.3 TABLET ORAL at 11:31

## 2023-04-14 RX ADMIN — NYSTATIN CREAM 1 APPLICATION(S): 100000 CREAM TOPICAL at 21:21

## 2023-04-14 RX ADMIN — Medication 80 MILLIGRAM(S): at 12:59

## 2023-04-14 RX ADMIN — TIOTROPIUM BROMIDE 2 PUFF(S): 18 CAPSULE ORAL; RESPIRATORY (INHALATION) at 09:44

## 2023-04-14 RX ADMIN — BUDESONIDE AND FORMOTEROL FUMARATE DIHYDRATE 2 PUFF(S): 160; 4.5 AEROSOL RESPIRATORY (INHALATION) at 21:40

## 2023-04-14 RX ADMIN — HEPARIN SODIUM 5000 UNIT(S): 5000 INJECTION INTRAVENOUS; SUBCUTANEOUS at 21:22

## 2023-04-14 RX ADMIN — INSULIN GLARGINE 20 UNIT(S): 100 INJECTION, SOLUTION SUBCUTANEOUS at 21:22

## 2023-04-14 RX ADMIN — Medication 3 MILLIGRAM(S): at 21:21

## 2023-04-14 RX ADMIN — Medication 4: at 09:02

## 2023-04-14 RX ADMIN — Medication 6 UNIT(S): at 09:01

## 2023-04-14 RX ADMIN — Medication 81 MILLIGRAM(S): at 11:33

## 2023-04-14 RX ADMIN — POLYETHYLENE GLYCOL 3350 17 GRAM(S): 17 POWDER, FOR SOLUTION ORAL at 11:31

## 2023-04-14 RX ADMIN — Medication 12: at 12:59

## 2023-04-14 RX ADMIN — SENNA PLUS 2 TABLET(S): 8.6 TABLET ORAL at 21:21

## 2023-04-14 RX ADMIN — ALBUTEROL 2 PUFF(S): 90 AEROSOL, METERED ORAL at 09:45

## 2023-04-14 RX ADMIN — ALBUTEROL 2 PUFF(S): 90 AEROSOL, METERED ORAL at 12:00

## 2023-04-14 RX ADMIN — BUDESONIDE AND FORMOTEROL FUMARATE DIHYDRATE 2 PUFF(S): 160; 4.5 AEROSOL RESPIRATORY (INHALATION) at 09:45

## 2023-04-14 RX ADMIN — HEPARIN SODIUM 5000 UNIT(S): 5000 INJECTION INTRAVENOUS; SUBCUTANEOUS at 11:33

## 2023-04-14 RX ADMIN — Medication 6 UNIT(S): at 17:27

## 2023-04-14 RX ADMIN — ATORVASTATIN CALCIUM 20 MILLIGRAM(S): 80 TABLET, FILM COATED ORAL at 21:21

## 2023-04-14 RX ADMIN — PREGABALIN 1000 MICROGRAM(S): 225 CAPSULE ORAL at 11:32

## 2023-04-14 RX ADMIN — ALBUTEROL 2 PUFF(S): 90 AEROSOL, METERED ORAL at 21:40

## 2023-04-14 RX ADMIN — Medication 40 MILLIGRAM(S): at 06:31

## 2023-04-14 RX ADMIN — LOSARTAN POTASSIUM 100 MILLIGRAM(S): 100 TABLET, FILM COATED ORAL at 11:32

## 2023-04-14 RX ADMIN — Medication 6: at 17:27

## 2023-04-14 RX ADMIN — Medication 40 MILLIGRAM(S): at 15:05

## 2023-04-14 RX ADMIN — Medication 1000 UNIT(S): at 11:33

## 2023-04-14 RX ADMIN — Medication 50 MILLIGRAM(S): at 11:31

## 2023-04-14 NOTE — CONSULT NOTE ADULT - ASSESSMENT
Assessment:         Process of Care  --Reviewed dx/treatment problems and alignment with Goals of Care    Physical Aspects of Care  --Pain - Patient denies at this time. c/w current management  --Bowel Regimen - constipation. saenna + miralax.  bisacodyl PRN  --Dyspnea - No SOB at this time. Comfortable and in NAD  wean O2 per primary  --Nausea Vomiting - denies  --Debility/Weakness - PT as tolerated, OOB to chair, fall precautions  --CHF - as per cardiology team.      Psychological and Psychiatric Aspects of Care:   --Grief/Bereavement: emotional support provided  --Hx of psychiatric dx: none  -Pastoral Care Available PRN     Social Aspects of Care  -Palliative SW are available as needed    Cultural Aspects  -Primary Language: English    Goals of Care:  see above  Ethical and Legal Aspects: NA  Capacity- pt maintains capacity     HCP/Surrogate: two sons are proxies    Code Status- DNR/DNI  MOLST- DNR/DNI, compelted this admission  Dispo Plan- [place]    Discussed With: TRIPP Perez

## 2023-04-14 NOTE — PROGRESS NOTE ADULT - SUBJECTIVE AND OBJECTIVE BOX
CHIEF COMPLAINT: CHF    HPI:  69 y/o F with PMH COPD (on 3L O2 PRN), Type 2 DM, pulmonary HTN, LBBB, HTN, HLD presented with low SpO2 at home. Pt states that she did not feel that her lower extremity swelling improved since being home. She was in bed all day florentin and did not wake up until later in the day. She took her SpO2 which was 53% on room air. Reports lower extremity swelling, abdominal swelling, facial swelling, increased thirst. Does not feel that she snores or is fatigued during the day time. Has not had a bowel movement in a few days. Denies fevers, chills, chest pain, N/V, diarrhea.    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 Labs: neutrophils 82%, K+ 5.5, glucose 148, AST 67, BNP 8970. VBG: pH 7.26, CO2 81, HCO3 36 -> ABG: pH 7.35, CO2 61, HCO3 24. COVID, influenza A/B, RSV negative. EKG: NSR with HR 86 bpm, PVCs,  ms (personally reviewed). CTA: No pulmonary embolus. Small right and trace left pleural effusions with adjacent compressive atelectasis. Small volume upper abdominal ascites. Cholithiasis.     Pt was given 250 ml of NS, lasix 40 mg IVP, Ipatropium nebulizer, magnesium sulfate, 125 mg IVP Solumedrol. She is being placed on telemetry observation for further management.  (10 Apr 2023 02:51)    Cardiology consulted to evaluate for HFpEF, severe pHTN plan for RHC. Pt; was admitted 3/9-3/13/23 for HFpEF exacerb, now p/w with decreased O2 Sat at home likely due to HFpEF, COPD and severe pHTN,   at present on Venti Mask. Pt's outpatient cardiologist is Bhupendra Joiner in [place].     4/11/23: Pt sitting up in chair.  +SOB but mildly improved.  +Bilat edema L>R  Tele: sinus rhythm PVC's, APC's, vent couplets  4/12/23: feels better, Tele: NSR 70s  4/13/23: less SOB; Tele: NSR 80 no events - can dc tele, CardioMEMS today   4/14/23: on venti mask, dyspnea not much changed, Tele: NSR 70s, s/p RHC yesterday, no CardioMEMS inserted     MEDICATIONS  (STANDING):  albuterol    90 MICROgram(s) HFA Inhaler 2 Puff(s) Inhalation every 4 hours  aspirin enteric coated 81 milliGRAM(s) Oral daily  atorvastatin 20 milliGRAM(s) Oral at bedtime  budesonide 160 MICROgram(s)/formoterol 4.5 MICROgram(s) Inhaler 2 Puff(s) Inhalation two times a day  cholecalciferol 1000 Unit(s) Oral daily  cyanocobalamin 1000 MICROGram(s) Oral daily  dextrose 5%. 1000 milliLiter(s) (100 mL/Hr) IV Continuous <Continuous>  dextrose 5%. 1000 milliLiter(s) (50 mL/Hr) IV Continuous <Continuous>  dextrose 50% Injectable 25 Gram(s) IV Push once  dextrose 50% Injectable 12.5 Gram(s) IV Push once  dextrose 50% Injectable 25 Gram(s) IV Push once  furosemide    Tablet 40 milliGRAM(s) Oral two times a day  glucagon  Injectable 1 milliGRAM(s) IntraMuscular once  heparin   Injectable 5000 Unit(s) SubCutaneous every 12 hours  insulin glargine Injectable (LANTUS) 20 Unit(s) SubCutaneous at bedtime  insulin lispro (ADMELOG) corrective regimen sliding scale   SubCutaneous three times a day before meals  insulin lispro (ADMELOG) corrective regimen sliding scale   SubCutaneous at bedtime  insulin lispro Injectable (ADMELOG) 6 Unit(s) SubCutaneous three times a day before meals  losartan 100 milliGRAM(s) Oral daily  magnesium oxide 400 milliGRAM(s) Oral daily  methylPREDNISolone sodium succinate Injectable 40 milliGRAM(s) IV Push every 8 hours  metoprolol succinate ER 50 milliGRAM(s) Oral daily  polyethylene glycol 3350 17 Gram(s) Oral daily  senna 2 Tablet(s) Oral at bedtime  tiotropium 2.5 MICROgram(s) Inhaler 2 Puff(s) Inhalation daily      MEDICATIONS  (PRN):  acetaminophen     Tablet .. 650 milliGRAM(s) Oral every 6 hours PRN Temp greater or equal to 38C (100.4F), Mild Pain (1 - 3)  aluminum hydroxide/magnesium hydroxide/simethicone Suspension 30 milliLiter(s) Oral every 4 hours PRN Dyspepsia  dextrose Oral Gel 15 Gram(s) Oral once PRN Blood Glucose LESS THAN 70 milliGRAM(s)/deciliter  melatonin 3 milliGRAM(s) Oral at bedtime PRN Insomnia  ondansetron Injectable 4 milliGRAM(s) IV Push every 8 hours PRN Nausea and/or Vomiting    Vital Signs Last 24 Hrs  T(C): 36.5 (14 Apr 2023 08:52), Max: 36.9 (13 Apr 2023 10:40)  T(F): 97.7 (14 Apr 2023 08:52), Max: 98.5 (13 Apr 2023 10:40)  HR: 59 (14 Apr 2023 08:52) (59 - 78)  BP: 137/99 (14 Apr 2023 08:52) (106/50 - 148/68)  BP(mean): --  RR: 18 (14 Apr 2023 08:52) (18 - 23)  SpO2: 96% (14 Apr 2023 08:52) (85% - 96%)    Parameters below as of 14 Apr 2023 08:52  Patient On (Oxygen Delivery Method): mask, nonrebreather      PHYSICAL EXAM:    General: Awake and alert, No acute distress.   Skin: Warm, dry, and pink.   Eyes: Pupils equal and reactive to light. No conjunctival injection, discharge, or scleral icterus.   HEENT: Atraumatic, normocephalic.    Cardiology: Normal S1, S2. +SHELDON. Regular rate and rhythm.   Respiratory: Decreased breath sounds with exp wheeze noted    Gastrointestinal: Positive bowel sounds. Soft, non-tender, non-distended.    Extremities: 2+ peripheral edema bilaterally L>R   Neurological: A+Ox3     LABS:                        11.9   6.57  )-----------( 213      ( 14 Apr 2023 08:35 )             43.1     04-14    141  |  103  |  88<H>  ----------------------------<  222<H>  4.8   |  38<H>  |  1.11    Ca    9.5      14 Apr 2023 08:35      - TroponinI hsT: <-36.31, <-34.35                          12.2   6.97  )-----------( 265      ( 12 Apr 2023 07:47 )             44.7     04-13    137  |  102  |  102<H>  ----------------------------<  228<H>  5.0   |  34<H>  |  1.45<H>    Ca    9.4      13 Apr 2023 08:19  Mg     2.7     04-12    TPro  6.7  /  Alb  3.2<L>  /  TBili  0.4  /  DBili  x   /  AST  25  /  ALT  34  /  AlkPhos  77  04-12    - TroponinI hsT: <-36.31, <-34.35                        12.4   6.84  )-----------( 243      ( 11 Apr 2023 08:07 )             43.8                           12.2   4.07  )-----------( 299      ( 10 Apr 2023 08:24 )             42.6                         13.0   4.76  )-----------( 332      ( 09 Apr 2023 17:42 )             46.2     04-11    137  |  102  |  76<H>  ----------------------------<  197<H>  5.4<H>   |  34<H>  |  1.54<H>    Ca    9.7      11 Apr 2023 08:07  Mg     2.8     04-11    TPro  7.1  /  Alb  3.3  /  TBili  0.4  /  DBili  x   /  AST  37  /  ALT  35  /  AlkPhos  88  04-11      10 Apr 2023 08:24    136    |  102    |  65     ----------------------------<  178    5.1     |  30     |  1.21   10 Apr 2023 04:00    x      |  x      |  x      ----------------------------<  180    x       |  x      |  x      09 Apr 2023 17:42    139    |  102    |  59     ----------------------------<  148    5.5     |  34     |  1.18     Ca    9.2        10 Apr 2023 08:24  Ca    9.4        09 Apr 2023 17:42  Mg     2.7       10 Apr 2023 08:24  Mg     2.5       09 Apr 2023 17:42    TPro  7.1    /  Alb  3.4    /  TBili  0.6    /  DBili  x      /  AST  51     /  ALT  40     /  AlkPhos  100    10 Apr 2023 08:24  TPro  7.6    /  Alb  3.7    /  TBili  0.9    /  DBili  x      /  AST  67     /  ALT  40     /  AlkPhos  115    09 Apr 2023 17:42    Radiology/Echocardiogram    < from: Xray Abdomen 2 Views (04.10.23 @ 03:43) >  IMPRESSION:  1. Technically limited study due to both motion and patient's body   habitus.  2. Atelectasis in the right middle and lower lobe of the right lung is   present.  3. Nonspecific mildly dilated centrally located small bowel loops and   partial aeration of the proximal left colon.  4. Chronic degenerative changes of the lumbar spine and right greater   than left hips. 5. AVN of the right hip cannot be excluded from the study.    < end of copied text >          -------------------------------------------------------------------------------------------  < from: TTE Echo Complete w/ Contrast w/ Doppler (03.10.23 @ 10:02) >     Impression     Summary     Endocardium is not well visualized, however, overall left ventricular   systolic function appears normal. Technically Difficult Study.   Septal flattening is seen; this finding is consistent with right heart   pressure / volume overload.   Estimated left ventricular ejection fraction is 55-60 %.   Normal appearing left atrium.   The right atrium appears moderately dilated.   The right ventricle is severely dilated.   The right ventricular apex appears hypokinetic.   The aortic valve is trileaflet with thin pliable leaflets.   Moderate mitral annular calcification is present.   There is calcification of mitral valve leaflets. The leaflet opening is   normal.   EA reversal of the mitral inflow consistent with reduced compliance of   the   left ventricle.   Trace mitral regurgitation is present.   The tricuspid valve leaflets are thin and pliable; valve motion is   normal.   Moderate (2+) tricuspid valve regurgitation is present.   Severe pulmonary hypertension.   Pulmonic valve not well seen.   No evidence of pericardial effusion.   No evidence of pleural effusion.   IVC is dilated and not collapsing with inspiration.     Signature     ----------------------------------------------------------------   Electronically signed by Lyssa BATESInterpreting   physician) on 03/10/2023 10:44 AM   ----------------------------------------------------------------    < end of copied text >

## 2023-04-14 NOTE — PROGRESS NOTE ADULT - ASSESSMENT
67 y/o F presented with low SpO2 at home      1. Acute hypoxic and hypercapnic respiratory distress likely secondary to acute CHF exacerbation, COPD exacerbation, severe pulmonary HTN, CARRINGTON and obesity hypoventilation syndrome   - Telemetry/continuous pulse ox   - SpO2 53% -> % on Bipap -> 93% on 50% venti mask   - c/w Bipap QHS and PRN   - BNP 8970, pt is overloaded on exam, CTA: b/l effusions, abd ascites   - ECHO (3/10/23): EF 55-60%, RA and RV dilation, moderate 2+ tricuspid regurgitation, severe pulmonary HTN   - s/p 40 mg IVP lasix in the ER, continue with 40 mg IVP TID, on lasix gtt as below   - c/w home medications: beta blocker and ARB . STOP HCTZ/ norvasc  - Strict I+Os, daily weights   - 2L H20 restriction, 2g sodium restriction      - Keep K > 4 and Mg > 2    - Albuterol Q4H standing   - c/w Spiriva and Symbicort   - s/p 125 mg of Solumedrol, c/w Solumedrol 40 mg Q8H and taper down   - Cardiology consult - Dr. Parker   - Pulmonology consult - Dr. Mauricio   - Advanced heart failure team following   - Pt went for CardioMEMS today 4/13, procedure aborted 2/2 inability to tolerate procedure table 2/2 increase back and shoulder pain, patient moving on table and contaminating sterility, device not placed   - S/p IV lasix 80mg x1 and started on Lasix gtt @10ml/hr per advanced heart failure team. Monitor BMP closely     2. Hyperkalemia   - K+ 5.5 -> 5.1 -> 5.4 -> 4.7 -> 5.0, s/p lokelma, give PRN  - trend K+     3. Hyperglycemia secondary to Type 2 DM   - Glucose 148, monitor   - Ordered HbA1c - 7.3  - Carb restriction on diet   - C/w Lantus 20 units QHS, Ademelog 6 units TIDAC, moderate dose ISS ->titrate accordingly     5. Constipation   - c/w bowel regimen     6. History of COPD (on nocturnal home O2), Type 2 DM, pulmonary HTN, LBBB, HTN, HLD   - c/w home medications; verified with pt at the bedside  - Cholithiasis -> f/u with PCP outpt   - Hold Nisoldipine given lower extremity swelling     DVT ppx: Heparin 5,000 units Q12H (hold for PLT < 50,000)     Code status: DNR/DNI (Completed MOLST form with the pt at the bedside. The patient is A+Ox3 at the time of my evaluation and has full capacity. Able to make an informed decision and understands risks associated with decisions made. Pt is DNR/DNI, no feeding tube. Agreeable to IVF, IV abx, trial of Bipap. MOLST completed and placed into chart).   Emergency contact: Randy Fiore (son [phone]) or Manish Fiore (son [phone])

## 2023-04-14 NOTE — CONSULT NOTE ADULT - SUBJECTIVE AND OBJECTIVE BOX
HPI: Per Notes: 67 y/o F with HFpEF PMH HTN, HLD, COPD (on 3L O2 PRN), pHTN, LBBB, DM, who presented to  ED on  for dyspnea, LE edema, and weight gain.  She was recently admitted for similar symptoms and discharged on 3/14, she appears near euvolemic on exam with low normal BP    pt was scheduled for cardiomems procedure yesterday, but could not tolerate the procedure.     seen at bedside this afternoon one of her sons are present.  pt gave permission to speak while her son is present.   pt is sitting upright in chair on venti mask.  she appears comfortable and is speaking in complete sentences.  she has no complaints of pain or SOB (at time of my exam).  she is aware of the role of palliative and verbalizes to me that while her condition is OK righ tnow, she has chronic progressive diseases that could cause a serious detriment to her quality of life in the future.  she tells me that her quality of life is acceptable at [place] time, but if it were to suffer, she would need to reevaluate what how she pursues treatments.  Pt completed MOLST for DNR/DNI on this admission. no HCP, pt reports it would be her two sons.  encouraged them to bring a copy if possible.      PAIN: ( )Yes   (x)No  DYSPNEA: ( ) Yes  (x) No  Level:    PAST MEDICAL & SURGICAL HISTORY:  COPD, severe  H/O pulmonary hypertension  Hypertension  History of left bundle branch block (LBBB)  DM (diabetes mellitus), type 2  Hyperlipidemia  S/P       SOCIAL HX:    Hx opiate tolerance ( )YES  (x)NO    Baseline ADLs  (Prior to Admission)  ( ) Independent   ( )Dependent     (X) With Assistance    FAMILY HISTORY:  FH: heart disease    FH: COPD (chronic obstructive pulmonary disease) (Father, Mother)    FH: CHF (congestive heart failure) (Mother)    FH: lung cancer (Father)        Review of Systems:    - CONSTITUTIONAL: Denies appetite change, weight loss, fever and chills. Denies weakness and fatigue   - HEENT: Denies changes in vision and hearing.   - RESPIRATORY: Denies SOB, cough and/or respiratory secretions   - CV: Denies palpitations and CP. Denies edema   - GI: Denies abdominal pain,  nausea, vomiting and diarrhea. + Constipation  - : Denies dysuria and urinary frequency.   - MSK: Denies back pain, myalgia and joint pain. ambulates with walker at baseline.   - SKIN: Denies rash and pruritus.   - NEUROLOGICAL: Denies headache and syncope.   - PSYCHIATRIC: Denies confusion, recent changes in mood. Denies anxiety and depression. Denies suicidal ideations    All other systems reviewed and negative      PHYSICAL EXAM:    Vital Signs Last 24 Hrs  T(C): 36.5 (2023 08:52), Max: 36.7 (2023 20:54)  T(F): 97.7 (2023 08:52), Max: 98 (2023 20:54)  HR: 83 (2023 12:01) (59 - 83)  BP: 137/99 (2023 08:52) (112/53 - 137/99)  BP(mean): --  RR: 18 (2023 08:52) (18 - 22)  SpO2: 91% (2023 12:01) (91% - 96%)    Parameters below as of 2023 12:01  Patient On (Oxygen Delivery Method): mask, Venturi,.40      Daily     Daily Weight in k.7 (2023 06:02)    PPSV2:  70 %  FAST: n/a    - GENERAL: Alert and oriented x 3. No acute distress. obese  - EYES: EOMI. Anicteric.   - HENT: Moist mucous membranes.   - LUNGS: expiratory wheezing to auscultation bilaterally. No accessory muscle use. Speaking in complete sentences. on venit mask  - CARDIOVASCULAR: Regular rate and rhythm. No murmur. No JVD. + LE edema.   - ABDOMEN: Soft, non-tender and non-distended. No palpable masses. Normal bowel sounds   - EXTREMITIES: = LE edema. Non-tender.  FROM x4  - SKIN: Warm, no rashes or lesions easily visible.   - NEUROLOGIC: No focal neurological deficits.    - PSYCHIATRIC: Cooperative. Appropriate mood and affect.      LABS:                        11.9   6.57  )-----------( 213      ( 2023 08:35 )             43.1     04-14    141  |  103  |  88<H>  ----------------------------<  222<H>  4.8   |  38<H>  |  1.11    Ca    9.5      2023 08:35        Albumin: Albumin, Serum: 3.2 g/dL (12 @ 07:47)      Allergies    latex (Unknown)  No Known Drug Allergies    Intolerances      MEDICATIONS  (STANDING):  albuterol    90 MICROgram(s) HFA Inhaler 2 Puff(s) Inhalation every 4 hours  aspirin enteric coated 81 milliGRAM(s) Oral daily  atorvastatin 20 milliGRAM(s) Oral at bedtime  budesonide 160 MICROgram(s)/formoterol 4.5 MICROgram(s) Inhaler 2 Puff(s) Inhalation two times a day  cholecalciferol 1000 Unit(s) Oral daily  cyanocobalamin 1000 MICROGram(s) Oral daily  dextrose 5%. 1000 milliLiter(s) (50 mL/Hr) IV Continuous <Continuous>  dextrose 5%. 1000 milliLiter(s) (100 mL/Hr) IV Continuous <Continuous>  dextrose 50% Injectable 25 Gram(s) IV Push once  dextrose 50% Injectable 12.5 Gram(s) IV Push once  dextrose 50% Injectable 25 Gram(s) IV Push once  furosemide Infusion 10 mG/Hr (5 mL/Hr) IV Continuous <Continuous>  glucagon  Injectable 1 milliGRAM(s) IntraMuscular once  heparin   Injectable 5000 Unit(s) SubCutaneous every 12 hours  insulin glargine Injectable (LANTUS) 20 Unit(s) SubCutaneous at bedtime  insulin lispro (ADMELOG) corrective regimen sliding scale   SubCutaneous three times a day before meals  insulin lispro (ADMELOG) corrective regimen sliding scale   SubCutaneous at bedtime  insulin lispro Injectable (ADMELOG) 6 Unit(s) SubCutaneous three times a day before meals  losartan 100 milliGRAM(s) Oral daily  magnesium oxide 400 milliGRAM(s) Oral daily  methylPREDNISolone sodium succinate Injectable 40 milliGRAM(s) IV Push every 8 hours  metoprolol succinate ER 50 milliGRAM(s) Oral daily  polyethylene glycol 3350 17 Gram(s) Oral daily  senna 2 Tablet(s) Oral at bedtime  tiotropium 2.5 MICROgram(s) Inhaler 2 Puff(s) Inhalation daily    MEDICATIONS  (PRN):  acetaminophen     Tablet .. 650 milliGRAM(s) Oral every 6 hours PRN Temp greater or equal to 38C (100.4F), Mild Pain (1 - 3)  aluminum hydroxide/magnesium hydroxide/simethicone Suspension 30 milliLiter(s) Oral every 4 hours PRN Dyspepsia  bisacodyl 5 milliGRAM(s) Oral every 12 hours PRN Constipation  dextrose Oral Gel 15 Gram(s) Oral once PRN Blood Glucose LESS THAN 70 milliGRAM(s)/deciliter  melatonin 3 milliGRAM(s) Oral at bedtime PRN Insomnia  ondansetron Injectable 4 milliGRAM(s) IV Push every 8 hours PRN Nausea and/or Vomiting      RADIOLOGY/ADDITIONAL STUDIES:

## 2023-04-14 NOTE — PROGRESS NOTE ADULT - SUBJECTIVE AND OBJECTIVE BOX
JOHANA MCDERMOTT  MRN: [id number]    S: 2023: Events noted. Patient unable to tolerate positioning for cardiomems yesterday. Patient sleeping; on BiPAP. Arousable. Feels "better". Breathing comfortably on BiPAP.    PAST MEDICAL & SURGICAL HISTORY:  COPD, severe      H/O pulmonary hypertension      Hypertension      History of left bundle branch block (LBBB)      DM (diabetes mellitus), type 2      Hyperlipidemia      S/P           O: T(C): 36.7 (23 @ 20:54), Max: 37 (23 @ 09:11)  HR: 77 (23 @ 21:18) (65 - 78)  BP: 112/53 (23 @ 20:54) (106/50 - 148/68)  RR: 18 (23 @ 20:54) (18 - 23)  SpO2: 95% (23 @ 21:18) (85% - 96%)  Wt(kg): --    PHYSICAL EXAM:      GENERAL: no distress    NEURO: sleeping; arousable. Responds appropriately.     NECK: no JVD    CHEST: no wheezing    CARDIAC: RR    EXT: edema      LABS:          137  |  102  |  102<H>  ----------------------------<  228<H>  5.0   |  34<H>  |  1.45<H>    Ca    9.4      2023 08:19        Complete Blood Count in AM (23 @ 07:47)   WBC Count: 6.97 K/uL  RBC Count: 4.90 M/uL  Hemoglobin: 12.2 g/dL  Hematocrit: 44.7 %  Mean Cell Volume: 91.2 fl  Mean Cell Hemoglobin: 24.9 pg  Mean Cell Hemoglobin Conc: 27.3 gm/dL  Red Cell Distrib Width: 17.8 %  Platelet Count - Automated: 265 K/uL      RADIOLOGY:      EXAM:  CT ANGIO CHEST PULM ART WAWI   ORDERED BY: RON MARCELINO     PROCEDURE DATE:  2023          INTERPRETATION:  CLINICAL INFORMATION: Hypoxia and shortness of breath    COMPARISON: CT chest 2016.    CONTRAST/COMPLICATIONS:  IV Contrast: Omnipaque 350  80 cc administered   20 cc discarded  Oral Contrast: NONE  Complications: None reported at time of study completion    PROCEDURE:  CT Angiography of the Chest.  Sagittal and coronal reformats were performed as wellas 3D (MIP)   reconstructions.    FINDINGS:    LUNGS AND AIRWAYS: Patent central airways. Compressive atelectasis in the   right middle and bilateral lower lobes adjacent to pleural effusions.   Linear type scarring in the right upper lobe. PLEURA: Small right and   trace left pleural effusions.  MEDIASTINUM AND MARTINEZ: No lymphadenopathy.  VESSELS: No pulmonary embolus. Atherosclerotic calcifications of the   aorta and coronary arteries..  HEART: Mild cardiomegaly. No pericardial effusion.  CHEST WALL AND LOWER NECK: Within normal limits.  VISUALIZED UPPER ABDOMEN: Small volume upper abdominal ascites, grossly   unchanged when compared to prior study. Cholelithiasis.  BONES: Degenerative changes of the spine.    IMPRESSION:  No pulmonary embolus.    Small right and trace left pleural effusions with adjacent compressive   atelectasis.    Small volume upper abdominal ascites.        MEDICATIONS  (STANDING):  albuterol    90 MICROgram(s) HFA Inhaler 2 Puff(s) Inhalation every 4 hours  aspirin enteric coated 81 milliGRAM(s) Oral daily  atorvastatin 20 milliGRAM(s) Oral at bedtime  budesonide 160 MICROgram(s)/formoterol 4.5 MICROgram(s) Inhaler 2 Puff(s) Inhalation two times a day  cholecalciferol 1000 Unit(s) Oral daily  cyanocobalamin 1000 MICROGram(s) Oral daily  dextrose 5%. 1000 milliLiter(s) (50 mL/Hr) IV Continuous <Continuous>  dextrose 5%. 1000 milliLiter(s) (100 mL/Hr) IV Continuous <Continuous>  dextrose 50% Injectable 25 Gram(s) IV Push once  dextrose 50% Injectable 12.5 Gram(s) IV Push once  dextrose 50% Injectable 25 Gram(s) IV Push once  furosemide    Tablet 40 milliGRAM(s) Oral two times a day  glucagon  Injectable 1 milliGRAM(s) IntraMuscular once  heparin   Injectable 5000 Unit(s) SubCutaneous every 12 hours  insulin glargine Injectable (LANTUS) 20 Unit(s) SubCutaneous at bedtime  insulin lispro (ADMELOG) corrective regimen sliding scale   SubCutaneous three times a day before meals  insulin lispro (ADMELOG) corrective regimen sliding scale   SubCutaneous at bedtime  insulin lispro Injectable (ADMELOG) 6 Unit(s) SubCutaneous three times a day before meals  losartan 100 milliGRAM(s) Oral daily  magnesium oxide 400 milliGRAM(s) Oral daily  methylPREDNISolone sodium succinate Injectable 40 milliGRAM(s) IV Push every 8 hours  metoprolol succinate ER 50 milliGRAM(s) Oral daily  polyethylene glycol 3350 17 Gram(s) Oral daily  senna 2 Tablet(s) Oral at bedtime  tiotropium 2.5 MICROgram(s) Inhaler 2 Puff(s) Inhalation daily    MEDICATIONS  (PRN):  acetaminophen     Tablet .. 650 milliGRAM(s) Oral every 6 hours PRN Temp greater or equal to 38C (100.4F), Mild Pain (1 - 3)  aluminum hydroxide/magnesium hydroxide/simethicone Suspension 30 milliLiter(s) Oral every 4 hours PRN Dyspepsia  bisacodyl 5 milliGRAM(s) Oral every 12 hours PRN Constipation  dextrose Oral Gel 15 Gram(s) Oral once PRN Blood Glucose LESS THAN 70 milliGRAM(s)/deciliter  melatonin 3 milliGRAM(s) Oral at bedtime PRN Insomnia  ondansetron Injectable 4 milliGRAM(s) IV Push every 8 hours PRN Nausea and/or Vomiting        A/P: 1. Hypoxic / hypercapneic respiratory failure.    2. COPD.     3. CHF.     4. Pulmonary HTN - group 2 and 3    5. CARRINGTON.     6. Obesity hypoventilation.     PLAN: Continue NIPPV; O2 supplement. Optimize tx for CHF per heart-failure team. Continue inhaled LABA/LAMA/ICS. IV steroids.     Will follow.

## 2023-04-14 NOTE — PROGRESS NOTE ADULT - ASSESSMENT
67 y/o F with HFpEF PMH HTN, HLD, COPD (on 3L O2 PRN), pHTN, LBBB, DM, who presented to  ED on 4/9 for dyspnea, LE edema, and weight gain.  She was recently admitted for similar symptoms and discharged on 3/14, she appears near euvolemic on exam with low normal BP      4/13:  for cardiomems today, Cr improving with BUN continuing to rise   4/14: Unable to tolerate cardiomems yesterday.  Filling pressures significantly elevated.         TTE 4/10/23: LVEF 55-60%, septal flattening noted,  LA Normal, RV severely dilated with RV apex appearing hypokinetic, RA moderately dilated,  trace MR, moderate TR, RVSP 70, IVC dilated   RHC 4/13/23: RA 30 RV 96/21 PA 91/42 (62) PA Sat 47.9% procedure stopped tomi to patient discomfort.  67 y/o F with HFpEF PMH HTN, HLD, COPD (on 3L O2 PRN), pHTN, LBBB, DM, who presented to  ED on 4/9 for dyspnea, LE edema, and weight gain.  She was recently admitted for similar symptoms and discharged on 3/14, she appears near euvolemic on exam with low normal BP      4/13:  for cardiomems today, Cr improving with BUN continuing to rise   4/14: Unable to tolerate cardiomems yesterday.  Filling pressures significantly elevated.         TTE 4/10/23: LVEF 55-60%, septal flattening noted,  LA Normal, RV severely dilated with RV apex appearing hypokinetic, RA moderately dilated,  trace MR, moderate TR, RVSP 70, IVC dilated   RHC 4/13/23: RA 30 RV 96/21 PA 91/42 (62) PA Sat 47.9% CO 5.27 CI 2.35 procedure stopped tomi to patient discomfort.

## 2023-04-14 NOTE — CONSULT NOTE ADULT - CONVERSATION DETAILS
Pt is aware of the role of palliative and verbalizes to me that while her condition is OK righ tnow, she has chronic progressive diseases that could cause a serious detriment to her quality of life in the future.  she tells me that her quality of life is acceptable at that time, but if it were to suffer, she would need to reevaluate what how she pursues treatments.  Pt completed MOLST for DNR/DNI on this admission. no HCP, pt reports it would be her two sons.  encouraged them to bring a copy if possible.

## 2023-04-14 NOTE — PROGRESS NOTE ADULT - PROBLEM SELECTOR PLAN 1
·  Problem: Acute on chronic diastolic congestive heart failure.   ·  Recommendation: pt; with known HFpEF p/w decreased O2 Sat at home, likely multifactorial  due to acute on chronic HFpEF exacerbation, severe pHTN,, COPD exacerbation and CARRINGTON/obesity; elevated BNP 8970 - similar to previous admission in March 2023.  Echo from 3/23 shows preserved LVEF 55-60%, with severely dilated RV, severe pHTN and moderate TR   pt. and RHC on 4/13 that showed elevated pressures indicative for severe pHTN, CardioMMES not implanted as pt. did not tolerate. cont. lasix 40 mg po bid (home dose), cont. losartan 100 mg po daily,  dced HCTZ, do not restart norvasc, GDMT with BB/ARB

## 2023-04-14 NOTE — PROGRESS NOTE ADULT - SUBJECTIVE AND OBJECTIVE BOX
Subjective:    Dyspnea unchanged.  Denies any chest pain    Medications:  acetaminophen     Tablet .. 650 milliGRAM(s) Oral every 6 hours PRN  albuterol    90 MICROgram(s) HFA Inhaler 2 Puff(s) Inhalation every 4 hours  aluminum hydroxide/magnesium hydroxide/simethicone Suspension 30 milliLiter(s) Oral every 4 hours PRN  aspirin enteric coated 81 milliGRAM(s) Oral daily  atorvastatin 20 milliGRAM(s) Oral at bedtime  bisacodyl 5 milliGRAM(s) Oral every 12 hours PRN  budesonide 160 MICROgram(s)/formoterol 4.5 MICROgram(s) Inhaler 2 Puff(s) Inhalation two times a day  cholecalciferol 1000 Unit(s) Oral daily  cyanocobalamin 1000 MICROGram(s) Oral daily  dextrose 5%. 1000 milliLiter(s) IV Continuous <Continuous>  dextrose 5%. 1000 milliLiter(s) IV Continuous <Continuous>  dextrose 50% Injectable 25 Gram(s) IV Push once  dextrose 50% Injectable 12.5 Gram(s) IV Push once  dextrose 50% Injectable 25 Gram(s) IV Push once  dextrose Oral Gel 15 Gram(s) Oral once PRN  furosemide   Injectable 80 milliGRAM(s) IV Push once  furosemide Infusion 10 mG/Hr IV Continuous <Continuous>  glucagon  Injectable 1 milliGRAM(s) IntraMuscular once  heparin   Injectable 5000 Unit(s) SubCutaneous every 12 hours  insulin glargine Injectable (LANTUS) 20 Unit(s) SubCutaneous at bedtime  insulin lispro (ADMELOG) corrective regimen sliding scale   SubCutaneous three times a day before meals  insulin lispro (ADMELOG) corrective regimen sliding scale   SubCutaneous at bedtime  insulin lispro Injectable (ADMELOG) 6 Unit(s) SubCutaneous three times a day before meals  losartan 100 milliGRAM(s) Oral daily  magnesium oxide 400 milliGRAM(s) Oral daily  melatonin 3 milliGRAM(s) Oral at bedtime PRN  methylPREDNISolone sodium succinate Injectable 40 milliGRAM(s) IV Push every 8 hours  metoprolol succinate ER 50 milliGRAM(s) Oral daily  ondansetron Injectable 4 milliGRAM(s) IV Push every 8 hours PRN  polyethylene glycol 3350 17 Gram(s) Oral daily  senna 2 Tablet(s) Oral at bedtime  tiotropium 2.5 MICROgram(s) Inhaler 2 Puff(s) Inhalation daily      Physical Exam:    Vitals:  Vital Signs Last 24 Hours  T(C): 36.5 (23 @ 08:52), Max: 36.7 (23 @ 20:54)  HR: 83 (23 @ 12:01) (59 - 83)  BP: 137/99 (23 @ 08:52) (106/50 - 148/68)  RR: 18 (23 @ 08:52) (18 - 23)  SpO2: 91% (23 @ 12:01) (91% - 96%)    Weight in k.7 ( @ 06:02)    I&O's Summary    2023 07:01  -  2023 07:00  --------------------------------------------------------  IN: 0 mL / OUT: 1100 mL / NET: -1100 mL        Tele: Sinus 60s-80s    General: No distress. Comfortable.  HEENT: EOM intact.  Neck: Neck supple. JVP difficult to assess. No masses  Chest: Clear to auscultation bilaterally  CV: Normal S1 and S2. No murmurs, rub, or gallops. Radial pulses normal.  Abdomen: Soft, non-distended, non-tender  Skin: No rashes or skin breakdown  Extremities:  Warm 1-2+ LE Edema   Neurology: Alert and oriented times three. Sensation intact  Psych: Affect normal    Labs:                        11.9   6.57  )-----------( 213      ( 2023 08:35 )             43.1         141  |  103  |  88<H>  ----------------------------<  222<H>  4.8   |  38<H>  |  1.11    Ca    9.5      2023 08:35

## 2023-04-14 NOTE — PROGRESS NOTE ADULT - SUBJECTIVE AND OBJECTIVE BOX
Chief Complaint: Patient is a 68y old  Female who presents with a chief complaint of Low SpO2 (10 Apr 2023 11:42)      Interval events:   - Reports dyspnea unchanged, remains on venti mask 40% FiO2   - S/p IV lasix 80mg x1 and started on Lasix gtt @10ml/hr per advanced heart failure team     ROS:   Patient denies any current chest pain, cough, f/c/n/v/d, abd pain, myalgias, dysuria, HA, dizziness  All other review of systems is negative unless indicated above    Physical Exam:  Vital Signs Last 24 Hrs  T(C): 36.5 (14 Apr 2023 08:52), Max: 36.7 (13 Apr 2023 20:54)  T(F): 97.7 (14 Apr 2023 08:52), Max: 98 (13 Apr 2023 20:54)  HR: 83 (14 Apr 2023 12:01) (59 - 83)  BP: 137/99 (14 Apr 2023 08:52) (112/53 - 137/99)  BP(mean): --  RR: 18 (14 Apr 2023 08:52) (18 - 18)  SpO2: 91% (14 Apr 2023 12:01) (91% - 96%)    Parameters below as of 14 Apr 2023 12:01  Patient On (Oxygen Delivery Method): mask, Venturi,.40    Constitutional: NAD, awake and alert, obese female   HEENT: PERRLA, EOMI, MMM  Respiratory: dec BS at bases / expiratory wheeze improving  Cardiovascular: S1 and S2, RRR, no murmurs, gallops or rubs  Gastrointestinal: +BS, soft, non-tender, non-distended, no CVA tenderness  Extremities: +LE edema b/l, +DP pulses b/l  Neurological: A&O x 3, no focal deficits  Musculoskeletal: 5/5 strength b/l upper and lower extremities  Skin: Normal, skin warm and dry    Labs:  04-13-23 @ 08:19  Glucose 228 [70 - 99]  CO2 total 34 [22 - 31]  Chloride 102 [96 - 108]  Sodium 137 [135 - 145]  Potassium 5.0 [3.5 - 5.3]  Calcium 9.4 [8.5 - 10.1]  Creatinine 1.45 [0.50 - 1.30]   [7 - 23]  eGFR 39  Anion gap 1 [5 - 17]  AST --  ALT --  Alk phos --  Albumin --    04-12-23 @ 07:47  Glucose 198 [70 - 99]  CO2 total 38 [22 - 31]  Chloride 101 [96 - 108]  Sodium 141 [135 - 145]  Potassium 4.7 [3.5 - 5.3]  Calcium 9.4 [8.5 - 10.1]  Creatinine 1.58 [0.50 - 1.30]  BUN 80 [7 - 23]  eGFR 35  Anion gap 2 [5 - 17]  AST 25 [15 - 37]  ALT 34 [12 - 78]  Alk phos 77 [40 - 120]  Albumin 3.2 [3.3 - 5.0]    WBC 6.97 [3.80 - 10.50]  Hemoglobin 12.2 [11.5 - 15.5]  Hematocrit 44.7 [34.5 - 45.0]  Platelets 265 [150 - 400]      04-11-23 @ 08:07  Glucose 197 [70 - 99]  CO2 total 34 [22 - 31]  Chloride 102 [96 - 108]  Sodium 137 [135 - 145]  Potassium 5.4 [3.5 - 5.3]  Calcium 9.7 [8.5 - 10.1]  Creatinine 1.54 [0.50 - 1.30]  BUN 76 [7 - 23]  eGFR 36  Anion gap 1 [5 - 17]  AST 37 [15 - 37]  ALT 35 [12 - 78]  Alk phos 88 [40 - 120]  Albumin 3.3 [3.3 - 5.0]    WBC 6.84 [3.80 - 10.50]  Hemoglobin 12.4 [11.5 - 15.5]  Hematocrit 43.8 [34.5 - 45.0]  Platelets 243 [150 - 400]      04-10 @ 08:24  Glucose 178 mg/dL  HCO3 30 mmol/L  Chloride 102 mmol/L  Sodium 136 mmol/L>   Potassium 5.1 mmol/L  Creatinine 1.21 mg/dL  Calcium 9.2 mg/dL  BUN 65 mg/dL  eGFR 48 mL/min/1.73m2  Anion gap 4 mmol/L    WBC 4.07  Hemoglobin 12.2  Hemoatocrit 42.6  Platelet count 299          Cardiac testing:  Troponin I, High Sensitivity Result: 36.31 ng/L (04-09-23 @ 21:05)  Troponin I, High Sensitivity Result: 34.35 ng/L (04-09-23 @ 17:42)        12 Lead ECG:   Ventricular Rate 86 BPM    Atrial Rate 86 BPM    P-R Interval 162 ms    QRS Duration 126 ms    Q-T Interval 406 ms    QTC Calculation(Bazett) 485 ms    P Axis 65 degrees    R Axis 230 degrees    T Axis 86 degrees    Diagnosis Line Sinus rhythm withfrequent Premature ventricular complexes  Indeterminate axis  Intraventricular conduction block  Possible Anterolateral infarct (cited on or before 16-FEB-2016)  Abnormal ECG  When compared with ECG of 10-MAR-2023 07:38,  Premature ventricular complexes are now Present  QRS axis Shifted left  Confirmed by CORNELIUS MELENDEZ ([id number]) on 4/10/2023 4:54:07 PM (04-09-23 @ 17:49)      TTE Echo Complete w/ Contrast w/ Doppler:   PROCEDURE DATE:  03/10/2023      INTERPRETATION:  Transthoracic Echocardiography Report (TTE)   Impression     Summary     Endocardium is not well visualized, however, overall left ventricular   systolic function appears normal. Technically Difficult Study.   Septal flattening is seen; this finding is consistent with right heart   pressure / volume overload.   Estimated left ventricular ejection fraction is 55-60 %.   Normal appearing left atrium.   The right atriumappears moderately dilated.   The right ventricle is severely dilated.   The right ventricular apex appears hypokinetic.   The aortic valve is trileaflet with thin pliable leaflets.   Moderate mitral annular calcification is present.   There is calcification of mitral valve leaflets. The leaflet opening is   normal.   EA reversal of the mitral inflow consistent with reduced compliance of   the   left ventricle.   Trace mitral regurgitation is present.   The tricuspid valve leaflets are thin and pliable; valve motion is   normal.   Moderate (2+) tricuspid valve regurgitation is present.   Severe pulmonary hypertension.   Pulmonic valve not well seen.   No evidence of pericardial effusion.   No evidence of pleural effusion.   IVC is dilated andnot collapsing with inspiration.     Signature     ----------------------------------------------------------------   Electronically signed by Lyssa Horta MD(Interpreting   physician) on 03/10/2023 10:44 AM   ----------------------------------------------------------------      Radiology:  < from: CT Angio Chest PE Protocol w/ IV Cont (04.09.23 @ 21:49) >    IMPRESSION:  No pulmonary embolus.    Small right and trace left pleural effusions with adjacent compressive   atelectasis.    Small volume upper abdominal ascites.    < end of copied text >      Medications:  MEDICATIONS  (STANDING):  albuterol    90 MICROgram(s) HFA Inhaler 2 Puff(s) Inhalation every 4 hours  aspirin enteric coated 81 milliGRAM(s) Oral daily  atorvastatin 20 milliGRAM(s) Oral at bedtime  budesonide 160 MICROgram(s)/formoterol 4.5 MICROgram(s) Inhaler 2 Puff(s) Inhalation two times a day  cholecalciferol 1000 Unit(s) Oral daily  cyanocobalamin 1000 MICROGram(s) Oral daily  dextrose 5%. 1000 milliLiter(s) (50 mL/Hr) IV Continuous <Continuous>  dextrose 5%. 1000 milliLiter(s) (100 mL/Hr) IV Continuous <Continuous>  dextrose 50% Injectable 25 Gram(s) IV Push once  dextrose 50% Injectable 12.5 Gram(s) IV Push once  dextrose 50% Injectable 25 Gram(s) IV Push once  furosemide   Injectable 40 milliGRAM(s) IV Push two times a day  glucagon  Injectable 1 milliGRAM(s) IntraMuscular once  heparin   Injectable 5000 Unit(s) SubCutaneous every 12 hours  hydrochlorothiazide 12.5 milliGRAM(s) Oral daily  insulin glargine Injectable (LANTUS) 20 Unit(s) SubCutaneous at bedtime  insulin lispro (ADMELOG) corrective regimen sliding scale   SubCutaneous three times a day before meals  insulin lispro (ADMELOG) corrective regimen sliding scale   SubCutaneous at bedtime  insulin lispro Injectable (ADMELOG) 6 Unit(s) SubCutaneous three times a day before meals  losartan 100 milliGRAM(s) Oral daily  magnesium oxide 400 milliGRAM(s) Oral daily  methylPREDNISolone sodium succinate Injectable 40 milliGRAM(s) IV Push every 6 hours  metoprolol succinate ER 50 milliGRAM(s) Oral daily  polyethylene glycol 3350 17 Gram(s) Oral daily  senna 2 Tablet(s) Oral at bedtime  tiotropium 2.5 MICROgram(s) Inhaler 2 Puff(s) Inhalation daily    MEDICATIONS  (PRN):  acetaminophen     Tablet .. 650 milliGRAM(s) Oral every 6 hours PRN Temp greater or equal to 38C (100.4F), Mild Pain (1 - 3)  aluminum hydroxide/magnesium hydroxide/simethicone Suspension 30 milliLiter(s) Oral every 4 hours PRN Dyspepsia  dextrose Oral Gel 15 Gram(s) Oral once PRN Blood Glucose LESS THAN 70 milliGRAM(s)/deciliter  melatonin 3 milliGRAM(s) Oral at bedtime PRN Insomnia  ondansetron Injectable 4 milliGRAM(s) IV Push every 8 hours PRN Nausea and/or Vomiting

## 2023-04-14 NOTE — PROGRESS NOTE ADULT - PROBLEM SELECTOR PLAN 1
- 2/2 pHTN, COPD, obesity   - significantly elevated filling pressures  - Give lasix 80 mg IV push now  - Start lasix gtt 10 mg/hr  - continue losartan 100 mg po daily    - continue Toprol 50 mg po daily    - closely monitor potassium and can consider resuming spironolactone if K downtrends   - avoid HCTZ in setting of recent FRANCIS   - avoid CCB in outpatient setting with recurrent LE edema   - unable to tolerate cardimems placement   - Spoke to her endo about SGLT2i, can consider as an outpatient when renal function improves,  - Strict I & Os to monitor volume status  - Daily weights

## 2023-04-15 LAB
BUN SERPL-MCNC: 79 MG/DL — HIGH (ref 7–23)
CALCIUM SERPL-MCNC: 9.6 MG/DL — SIGNIFICANT CHANGE UP (ref 8.5–10.1)
CHLORIDE SERPL-SCNC: 99 MMOL/L — SIGNIFICANT CHANGE UP (ref 96–108)
CO2 SERPL-SCNC: 41 MMOL/L — HIGH (ref 22–31)
CREAT SERPL-MCNC: 1.02 MG/DL — SIGNIFICANT CHANGE UP (ref 0.5–1.3)
EGFR: 59 ML/MIN/1.73M2 — LOW
GLUCOSE BLDC GLUCOMTR-MCNC: 162 MG/DL — HIGH (ref 70–99)
GLUCOSE BLDC GLUCOMTR-MCNC: 215 MG/DL — HIGH (ref 70–99)
GLUCOSE BLDC GLUCOMTR-MCNC: 260 MG/DL — HIGH (ref 70–99)
GLUCOSE BLDC GLUCOMTR-MCNC: 356 MG/DL — HIGH (ref 70–99)
GLUCOSE SERPL-MCNC: 82 MG/DL — SIGNIFICANT CHANGE UP (ref 70–99)
HCT VFR BLD CALC: 43.2 % — SIGNIFICANT CHANGE UP (ref 34.5–45)
HGB BLD-MCNC: 11.8 G/DL — SIGNIFICANT CHANGE UP (ref 11.5–15.5)
MAGNESIUM SERPL-MCNC: 2.1 MG/DL — SIGNIFICANT CHANGE UP (ref 1.6–2.6)
MCHC RBC-ENTMCNC: 24.2 PG — LOW (ref 27–34)
MCHC RBC-ENTMCNC: 27.3 GM/DL — LOW (ref 32–36)
MCV RBC AUTO: 88.5 FL — SIGNIFICANT CHANGE UP (ref 80–100)
PLATELET # BLD AUTO: 213 K/UL — SIGNIFICANT CHANGE UP (ref 150–400)
POTASSIUM SERPL-MCNC: 4.2 MMOL/L — SIGNIFICANT CHANGE UP (ref 3.5–5.3)
POTASSIUM SERPL-SCNC: 4.2 MMOL/L — SIGNIFICANT CHANGE UP (ref 3.5–5.3)
RBC # BLD: 4.88 M/UL — SIGNIFICANT CHANGE UP (ref 3.8–5.2)
RBC # FLD: 17.6 % — HIGH (ref 10.3–14.5)
SODIUM SERPL-SCNC: 140 MMOL/L — SIGNIFICANT CHANGE UP (ref 135–145)
WBC # BLD: 7.84 K/UL — SIGNIFICANT CHANGE UP (ref 3.8–10.5)
WBC # FLD AUTO: 7.84 K/UL — SIGNIFICANT CHANGE UP (ref 3.8–10.5)

## 2023-04-15 PROCEDURE — 99233 SBSQ HOSP IP/OBS HIGH 50: CPT

## 2023-04-15 RX ORDER — FUROSEMIDE 40 MG
40 TABLET ORAL
Refills: 0 | Status: DISCONTINUED | OUTPATIENT
Start: 2023-04-15 | End: 2023-04-17

## 2023-04-15 RX ORDER — SODIUM CHLORIDE 9 MG/ML
250 INJECTION INTRAMUSCULAR; INTRAVENOUS; SUBCUTANEOUS ONCE
Refills: 0 | Status: COMPLETED | OUTPATIENT
Start: 2023-04-15 | End: 2023-04-15

## 2023-04-15 RX ADMIN — INSULIN GLARGINE 20 UNIT(S): 100 INJECTION, SOLUTION SUBCUTANEOUS at 21:40

## 2023-04-15 RX ADMIN — Medication 60 MILLIGRAM(S): at 16:24

## 2023-04-15 RX ADMIN — NYSTATIN CREAM 1 APPLICATION(S): 100000 CREAM TOPICAL at 18:32

## 2023-04-15 RX ADMIN — Medication 3 MILLIGRAM(S): at 21:39

## 2023-04-15 RX ADMIN — Medication 1000 UNIT(S): at 09:44

## 2023-04-15 RX ADMIN — Medication 6: at 21:40

## 2023-04-15 RX ADMIN — HEPARIN SODIUM 5000 UNIT(S): 5000 INJECTION INTRAVENOUS; SUBCUTANEOUS at 21:40

## 2023-04-15 RX ADMIN — ALBUTEROL 2 PUFF(S): 90 AEROSOL, METERED ORAL at 20:41

## 2023-04-15 RX ADMIN — ATORVASTATIN CALCIUM 20 MILLIGRAM(S): 80 TABLET, FILM COATED ORAL at 21:39

## 2023-04-15 RX ADMIN — Medication 6 UNIT(S): at 18:30

## 2023-04-15 RX ADMIN — HEPARIN SODIUM 5000 UNIT(S): 5000 INJECTION INTRAVENOUS; SUBCUTANEOUS at 09:44

## 2023-04-15 RX ADMIN — ALBUTEROL 2 PUFF(S): 90 AEROSOL, METERED ORAL at 23:20

## 2023-04-15 RX ADMIN — Medication 2: at 09:37

## 2023-04-15 RX ADMIN — NYSTATIN CREAM 1 APPLICATION(S): 100000 CREAM TOPICAL at 05:14

## 2023-04-15 RX ADMIN — Medication 6 UNIT(S): at 13:43

## 2023-04-15 RX ADMIN — SENNA PLUS 2 TABLET(S): 8.6 TABLET ORAL at 21:39

## 2023-04-15 RX ADMIN — ALBUTEROL 2 PUFF(S): 90 AEROSOL, METERED ORAL at 08:56

## 2023-04-15 RX ADMIN — Medication 40 MILLIGRAM(S): at 16:24

## 2023-04-15 RX ADMIN — Medication 50 MILLIGRAM(S): at 09:44

## 2023-04-15 RX ADMIN — PREGABALIN 1000 MICROGRAM(S): 225 CAPSULE ORAL at 09:44

## 2023-04-15 RX ADMIN — ALBUTEROL 2 PUFF(S): 90 AEROSOL, METERED ORAL at 13:26

## 2023-04-15 RX ADMIN — Medication 6: at 18:31

## 2023-04-15 RX ADMIN — MAGNESIUM OXIDE 400 MG ORAL TABLET 400 MILLIGRAM(S): 241.3 TABLET ORAL at 16:25

## 2023-04-15 RX ADMIN — Medication 4: at 13:44

## 2023-04-15 RX ADMIN — LOSARTAN POTASSIUM 100 MILLIGRAM(S): 100 TABLET, FILM COATED ORAL at 09:44

## 2023-04-15 RX ADMIN — TIOTROPIUM BROMIDE 2 PUFF(S): 18 CAPSULE ORAL; RESPIRATORY (INHALATION) at 08:56

## 2023-04-15 RX ADMIN — Medication 81 MILLIGRAM(S): at 09:44

## 2023-04-15 RX ADMIN — SODIUM CHLORIDE 500 MILLILITER(S): 9 INJECTION INTRAMUSCULAR; INTRAVENOUS; SUBCUTANEOUS at 03:06

## 2023-04-15 RX ADMIN — BUDESONIDE AND FORMOTEROL FUMARATE DIHYDRATE 2 PUFF(S): 160; 4.5 AEROSOL RESPIRATORY (INHALATION) at 20:42

## 2023-04-15 RX ADMIN — Medication 40 MILLIGRAM(S): at 05:14

## 2023-04-15 RX ADMIN — Medication 6 UNIT(S): at 09:37

## 2023-04-15 RX ADMIN — Medication 60 MILLIGRAM(S): at 21:39

## 2023-04-15 RX ADMIN — BUDESONIDE AND FORMOTEROL FUMARATE DIHYDRATE 2 PUFF(S): 160; 4.5 AEROSOL RESPIRATORY (INHALATION) at 08:56

## 2023-04-15 RX ADMIN — ALBUTEROL 2 PUFF(S): 90 AEROSOL, METERED ORAL at 00:30

## 2023-04-15 NOTE — PROGRESS NOTE ADULT - PROBLEM SELECTOR PLAN 1
pt; with known HFpEF p/w decreased O2 Sat at home, likely multifactorial  due to acute on chronic HFpEF exacerbation, severe pHTN,, COPD exacerbation and CARRINGTON/obesity; elevated BNP 8970 - similar to previous admission in March 2023.  Echo from 3/23 shows preserved LVEF 55-60%, with severely dilated RV, severe pHTN and moderate TR   pt. and RHC on 4/13 that showed elevated pressures indicative for severe pHTN, CardioMMES not implanted as pt. did not tolerate. cont. lasix 40 mg po bid (home dose), cont. losartan 100 mg po daily,  dced HCTZ, do not restart norvasc, GDMT with BB/ARB pt; with known HFpEF p/w decreased O2 Sat at home, likely multifactorial  due to acute on chronic HFpEF exacerbation, severe pHTN,, COPD exacerbation and CARRINGTON/obesity; elevated BNP 8970 - similar to previous admission in March 2023.  Echo from 3/23 shows preserved LVEF 55-60%, with severely dilated RV, severe pHTN and moderate TR   pt. and RHC on 4/13 that showed elevated pressures indicative for severe pHTN, CardioMMES not implanted as pt. did not tolerate. cont.  Patient became hypotensive on IV lasix drip which was discontinued  lasix 40 mg po bid (home dose), as patient had marked prerenal azotemia , low Normal BP cont. losartan 100 mg po daily,  dced HCTZ, do not restart norvasc, GDMT with BB/ARB

## 2023-04-15 NOTE — PROGRESS NOTE ADULT - SUBJECTIVE AND OBJECTIVE BOX
JOHANA MCDERMOTT  MRN: [id number]    S: 2023: Events noted. Patient unable to tolerate positioning for cardiomems yesterday. Patient sleeping; on BiPAP. Arousable. Feels "better". Breathing comfortably on BiPAP.    4/15/2023: Awake/alert. Sitting up in chair. Denies shortness of breath. Still requiring Ventimask to maintain adequate O2 sat.     PAST MEDICAL & SURGICAL HISTORY:  COPD, severe      H/O pulmonary hypertension      Hypertension      History of left bundle branch block (LBBB)      DM (diabetes mellitus), type 2      Hyperlipidemia      S/P           O: T(C): 36.7 (04-15-23 @ 09:10), Max: 36.8 (23 @ 17:21)  HR: 104 (04-15-23 @ 09:10) (62 - 104)  BP: 106/57 (04-15-23 @ 09:10) (83/47 - 131/60)  RR: 18 (04-15-23 @ 09:10) (18 - 18)  SpO2: 92% (04-15-23 @ 09:33) (90% - 98%)  Wt(kg): --    PHYSICAL EXAM:      GENERAL: comfortable at rest    NEURO: awake / alert    NECK: no JVD    CHEST: clear     CARDIAC: RR    EXT: edema      LABS:                          11.8   7.84  )-----------( 213      ( 15 Apr 2023 07:41 )             43.2       04-15    140  |  99  |  79<H>  ----------------------------<  82  4.2   |  41<H>  |  1.02    Ca    9.6      15 Apr 2023 07:41  Mg     2.1     04-15    RADIOLOGY:      EXAM:  CT ANGIO CHEST PULM ART [facility]   ORDERED BY: RON MARCELINO     PROCEDURE DATE:  2023          INTERPRETATION:  CLINICAL INFORMATION: Hypoxia and shortness of breath    COMPARISON: CT chest 2016.    CONTRAST/COMPLICATIONS:  IV Contrast: Omnipaque 350  80 cc administered   20 cc discarded  Oral Contrast: NONE  Complications: None reported at time of study completion    PROCEDURE:  CT Angiography of the Chest.  Sagittal and coronal reformats were performed as wellas 3D (MIP)   reconstructions.    FINDINGS:    LUNGS AND AIRWAYS: Patent central airways. Compressive atelectasis in the   right middle and bilateral lower lobes adjacent to pleural effusions.   Linear type scarring in the right upper lobe. PLEURA: Small right and   trace left pleural effusions.  MEDIASTINUM AND MARTINEZ: No lymphadenopathy.  VESSELS: No pulmonary embolus. Atherosclerotic calcifications of the   aorta and coronary arteries..  HEART: Mild cardiomegaly. No pericardial effusion.  CHEST WALL AND LOWER NECK: Within normal limits.  VISUALIZED UPPER ABDOMEN: Small volume upper abdominal ascites, grossly   unchanged when compared to prior study. Cholelithiasis.  BONES: Degenerative changes of the spine.    IMPRESSION:  No pulmonary embolus.    Small right and trace left pleural effusions with adjacent compressive   atelectasis.    Small volume upper abdominal ascites.      MEDICATIONS  (STANDING):  albuterol    90 MICROgram(s) HFA Inhaler 2 Puff(s) Inhalation every 4 hours  aspirin enteric coated 81 milliGRAM(s) Oral daily  atorvastatin 20 milliGRAM(s) Oral at bedtime  budesonide 160 MICROgram(s)/formoterol 4.5 MICROgram(s) Inhaler 2 Puff(s) Inhalation two times a day  cholecalciferol 1000 Unit(s) Oral daily  cyanocobalamin 1000 MICROGram(s) Oral daily  dextrose 5%. 1000 milliLiter(s) (50 mL/Hr) IV Continuous <Continuous>  dextrose 5%. 1000 milliLiter(s) (100 mL/Hr) IV Continuous <Continuous>  dextrose 50% Injectable 25 Gram(s) IV Push once  dextrose 50% Injectable 12.5 Gram(s) IV Push once  dextrose 50% Injectable 25 Gram(s) IV Push once  furosemide Infusion 10 mG/Hr (5 mL/Hr) IV Continuous <Continuous>  glucagon  Injectable 1 milliGRAM(s) IntraMuscular once  heparin   Injectable 5000 Unit(s) SubCutaneous every 12 hours  insulin glargine Injectable (LANTUS) 20 Unit(s) SubCutaneous at bedtime  insulin lispro (ADMELOG) corrective regimen sliding scale   SubCutaneous three times a day before meals  insulin lispro (ADMELOG) corrective regimen sliding scale   SubCutaneous at bedtime  insulin lispro Injectable (ADMELOG) 6 Unit(s) SubCutaneous three times a day before meals  losartan 100 milliGRAM(s) Oral daily  magnesium oxide 400 milliGRAM(s) Oral daily  methylPREDNISolone sodium succinate Injectable 40 milliGRAM(s) IV Push every 8 hours  metoprolol succinate ER 50 milliGRAM(s) Oral daily  nystatin Powder 1 Application(s) Topical two times a day  polyethylene glycol 3350 17 Gram(s) Oral daily  senna 2 Tablet(s) Oral at bedtime  tiotropium 2.5 MICROgram(s) Inhaler 2 Puff(s) Inhalation daily    MEDICATIONS  (PRN):  acetaminophen     Tablet .. 650 milliGRAM(s) Oral every 6 hours PRN Temp greater or equal to 38C (100.4F), Mild Pain (1 - 3)  aluminum hydroxide/magnesium hydroxide/simethicone Suspension 30 milliLiter(s) Oral every 4 hours PRN Dyspepsia  bisacodyl 5 milliGRAM(s) Oral every 12 hours PRN Constipation  dextrose Oral Gel 15 Gram(s) Oral once PRN Blood Glucose LESS THAN 70 milliGRAM(s)/deciliter  melatonin 3 milliGRAM(s) Oral at bedtime PRN Insomnia  ondansetron Injectable 4 milliGRAM(s) IV Push every 8 hours PRN Nausea and/or Vomiting        A/P: 1. Hypoxic / hypercapneic respiratory failure.    2. COPD.     3. CHF.     4. Pulmonary HTN - group 2 and 3    5. CARRINGTON.     6. Obesity hypoventilation.     PLAN: Continue NIPPV qHS; O2 supplement to maintain O2 sat > 89%. Optimize tx for CHF per heart-failure team. Continue inhaled LABA/LAMA/ICS. Consider decrease IV steroids. DVT prophylaxis. Increase activity as tolerated. Will benefit from PT.

## 2023-04-15 NOTE — PROGRESS NOTE ADULT - SUBJECTIVE AND OBJECTIVE BOX
CHIEF COMPLAINT: CHF  HPI:  67 y/o F with PMH COPD (on 3L O2 PRN), Type 2 DM, pulmonary HTN, LBBB, HTN, HLD presented with low SpO2 at home. Pt states that she did not feel that her lower extremity swelling improved since being home. She was in bed all day florentin and did not wake up until later in the day. She took her SpO2 which was 53% on room air. Reports lower extremity swelling, abdominal swelling, facial swelling, increased thirst. Does not feel that she snores or is fatigued during the day time. Has not had a bowel movement in a few days. Denies fevers, chills, chest pain, N/V, diarrhea.    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 Labs: neutrophils 82%, K+ 5.5, glucose 148, AST 67, BNP 8970. VBG: pH 7.26, CO2 81, HCO3 36 -> ABG: pH 7.35, CO2 61, HCO3 24. COVID, influenza A/B, RSV negative. EKG: NSR with HR 86 bpm, PVCs,  ms (personally reviewed). CTA: No pulmonary embolus. Small right and trace left pleural effusions with adjacent compressive atelectasis. Small volume upper abdominal ascites. Cholithiasis.     Pt was given 250 ml of NS, lasix 40 mg IVP, Ipatropium nebulizer, magnesium sulfate, 125 mg IVP Solumedrol. She is being placed on telemetry observation for further management.  (10 Apr 2023 02:51)    Cardiology consulted to evaluate for HFpEF, severe pHTN plan for RHC. Pt; was admitted 3/9-3/13/23 for HFpEF exacerb, now p/w with decreased O2 Sat at home likely due to HFpEF, COPD and severe pHTN,   at present on Venti Mask. Pt's outpatient cardiologist is Bhupendra Joiner in [place].     4/11/23: Pt sitting up in chair.  +SOB but mildly improved.  +Bilat edema L>R  Tele: sinus rhythm PVC's, APC's, vent couplets  4/12/23: feels better, Tele: NSR 70s  4/13/23: less SOB; Tele: NSR 80 no events - can dc tele, CardioMEMS today   4/14/23: on venti mask, dyspnea not much changed, Tele: NSR 70s, s/p RHC yesterday, no CardioMEMS inserted   4/15/23: On nasal cannula oxygen sat 94% OOB to chair, pt feels her symptoms are improving.Pt is motivated to improve health in subacute if necessary. Off lasik gtts 2/2 hypotension. Tele overnight 3-4 second of AIVR / current SR 60-80 with occasional PVC     MEDICATIONS  (STANDING):  albuterol    90 MICROgram(s) HFA Inhaler 2 Puff(s) Inhalation every 4 hours  aspirin enteric coated 81 milliGRAM(s) Oral daily  atorvastatin 20 milliGRAM(s) Oral at bedtime  budesonide 160 MICROgram(s)/formoterol 4.5 MICROgram(s) Inhaler 2 Puff(s) Inhalation two times a day  cholecalciferol 1000 Unit(s) Oral daily  cyanocobalamin 1000 MICROGram(s) Oral daily  dextrose 5%. 1000 milliLiter(s) (50 mL/Hr) IV Continuous <Continuous>  dextrose 5%. 1000 milliLiter(s) (100 mL/Hr) IV Continuous <Continuous>  dextrose 50% Injectable 25 Gram(s) IV Push once  dextrose 50% Injectable 12.5 Gram(s) IV Push once  dextrose 50% Injectable 25 Gram(s) IV Push once  furosemide Infusion 10 mG/Hr (5 mL/Hr) IV Continuous <Continuous>  glucagon  Injectable 1 milliGRAM(s) IntraMuscular once  heparin   Injectable 5000 Unit(s) SubCutaneous every 12 hours  insulin glargine Injectable (LANTUS) 20 Unit(s) SubCutaneous at bedtime  insulin lispro (ADMELOG) corrective regimen sliding scale   SubCutaneous three times a day before meals  insulin lispro (ADMELOG) corrective regimen sliding scale   SubCutaneous at bedtime  insulin lispro Injectable (ADMELOG) 6 Unit(s) SubCutaneous three times a day before meals  losartan 100 milliGRAM(s) Oral daily  magnesium oxide 400 milliGRAM(s) Oral daily  methylPREDNISolone sodium succinate Injectable 40 milliGRAM(s) IV Push every 8 hours  metoprolol succinate ER 50 milliGRAM(s) Oral daily  nystatin Powder 1 Application(s) Topical two times a day  polyethylene glycol 3350 17 Gram(s) Oral daily  senna 2 Tablet(s) Oral at bedtime  tiotropium 2.5 MICROgram(s) Inhaler 2 Puff(s) Inhalation daily    MEDICATIONS  (PRN):  acetaminophen     Tablet .. 650 milliGRAM(s) Oral every 6 hours PRN Temp greater or equal to 38C (100.4F), Mild Pain (1 - 3)  aluminum hydroxide/magnesium hydroxide/simethicone Suspension 30 milliLiter(s) Oral every 4 hours PRN Dyspepsia  bisacodyl 5 milliGRAM(s) Oral every 12 hours PRN Constipation  dextrose Oral Gel 15 Gram(s) Oral once PRN Blood Glucose LESS THAN 70 milliGRAM(s)/deciliter  melatonin 3 milliGRAM(s) Oral at bedtime PRN Insomnia  ondansetron Injectable 4 milliGRAM(s) IV Push every 8 hours PRN Nausea and/or Vomiting    Vital Signs Last 24 Hrs  T(C): 36.7 (15 Apr 2023 09:10), Max: 36.8 (14 Apr 2023 17:21)  T(F): 98 (15 Apr 2023 09:10), Max: 98.2 (14 Apr 2023 17:21)  HR: 104 (15 Apr 2023 09:10) (62 - 104)  BP: 106/57 (15 Apr 2023 09:10) (83/47 - 131/60)  BP(mean): 62 (15 Apr 2023 03:49) (56 - 62)  RR: 18 (15 Apr 2023 09:10) (18 - 18)  SpO2: 92% (15 Apr 2023 09:33) (90% - 98%)    Parameters below as of 15 Apr 2023 09:33  Patient On (Oxygen Delivery Method): nasal cannula  O2 Flow (L/min): 4.5      PHYSICAL EXAM:    General: Awake and alert, No acute distress.   Skin: Warm, dry, and pink.   Eyes: Pupils equal and reactive to light. No conjunctival injection, discharge, or scleral icterus.   HEENT: Atraumatic, normocephalic.    Cardiology: Normal S1, S2. +SHELDON. Regular rate and rhythm.   Respiratory: Decreased breath sounds with exp wheeze noted    Gastrointestinal: Positive bowel sounds. Soft, non-tender, non-distended.    Extremities: 2+ peripheral edema bilaterally L>R   Neurological: A+Ox3     LABS:                        11.8   7.84  )-----------( 213      ( 15 Apr 2023 07:41 )             43.2   04-15    140  |  99  |  79<H>  ----------------------------<  82  4.2   |  41<H>  |  1.02    Ca    9.6      15 Apr 2023 07:41  Mg     2.1     04-15                          11.9   6.57  )-----------( 213      ( 14 Apr 2023 08:35 )             43.1     04-14    141  |  103  |  88<H>  ----------------------------<  222<H>  4.8   |  38<H>  |  1.11    Ca    9.5      14 Apr 2023 08:35      - TroponinI hsT: <-36.31, <-34.35                          12.2   6.97  )-----------( 265      ( 12 Apr 2023 07:47 )             44.7     04-13    137  |  102  |  102<H>  ----------------------------<  228<H>  5.0   |  34<H>  |  1.45<H>    Ca    9.4      13 Apr 2023 08:19  Mg     2.7     04-12    TPro  6.7  /  Alb  3.2<L>  /  TBili  0.4  /  DBili  x   /  AST  25  /  ALT  34  /  AlkPhos  77  04-12    - TroponinI hsT: <-36.31, <-34.35                        12.4   6.84  )-----------( 243      ( 11 Apr 2023 08:07 )             43.8                           12.2   4.07  )-----------( 299      ( 10 Apr 2023 08:24 )             42.6                         13.0   4.76  )-----------( 332      ( 09 Apr 2023 17:42 )             46.2     04-11    137  |  102  |  76<H>  ----------------------------<  197<H>  5.4<H>   |  34<H>  |  1.54<H>    Ca    9.7      11 Apr 2023 08:07  Mg     2.8     04-11    TPro  7.1  /  Alb  3.3  /  TBili  0.4  /  DBili  x   /  AST  37  /  ALT  35  /  AlkPhos  88  04-11      10 Apr 2023 08:24    136    |  102    |  65     ----------------------------<  178    5.1     |  30     |  1.21   10 Apr 2023 04:00    x      |  x      |  x      ----------------------------<  180    x       |  x      |  x      09 Apr 2023 17:42    139    |  102    |  59     ----------------------------<  148    5.5     |  34     |  1.18     Ca    9.2        10 Apr 2023 08:24  Ca    9.4        09 Apr 2023 17:42  Mg     2.7       10 Apr 2023 08:24  Mg     2.5       09 Apr 2023 17:42    TPro  7.1    /  Alb  3.4    /  TBili  0.6    /  DBili  x      /  AST  51     /  ALT  40     /  AlkPhos  100    10 Apr 2023 08:24  TPro  7.6    /  Alb  3.7    /  TBili  0.9    /  DBili  x      /  AST  67     /  ALT  40     /  AlkPhos  115    09 Apr 2023 17:42    Radiology/Echocardiogram    < from: Xray Abdomen 2 Views (04.10.23 @ 03:43) >  IMPRESSION:  1. Technically limited study due to both motion and patient's body   habitus.  2. Atelectasis in the right middle and lower lobe of the right lung is   present.  3. Nonspecific mildly dilated centrally located small bowel loops and   partial aeration of the proximal left colon.  4. Chronic degenerative changes of the lumbar spine and right greater   than left hips. 5. AVN of the right hip cannot be excluded from the study.    < end of copied text >          -------------------------------------------------------------------------------------------  < from: TTE Echo Complete w/ Contrast w/ Doppler (03.10.23 @ 10:02) >     Impression     Summary     Endocardium is not well visualized, however, overall left ventricular   systolic function appears normal. Technically Difficult Study.   Septal flattening is seen; this finding is consistent with right heart   pressure / volume overload.   Estimated left ventricular ejection fraction is 55-60 %.   Normal appearing left atrium.   The right atrium appears moderately dilated.   The right ventricle is severely dilated.   The right ventricular apex appears hypokinetic.   The aortic valve is trileaflet with thin pliable leaflets.   Moderate mitral annular calcification is present.   There is calcification of mitral valve leaflets. The leaflet opening is   normal.   EA reversal of the mitral inflow consistent with reduced compliance of   the   left ventricle.   Trace mitral regurgitation is present.   The tricuspid valve leaflets are thin and pliable; valve motion is   normal.   Moderate (2+) tricuspid valve regurgitation is present.   Severe pulmonary hypertension.   Pulmonic valve not well seen.   No evidence of pericardial effusion.   No evidence of pleural effusion.   IVC is dilated and not collapsing with inspiration.     Signature     ----------------------------------------------------------------   Electronically signed by Lyssa Horta MD(Interpreting   physician) on 03/10/2023 10:44 AM   ----------------------------------------------------------------    < end of copied text >           CHIEF COMPLAINT: CHF  HPI:  67 y/o F with PMH COPD (on 3L O2 PRN), Type 2 DM, pulmonary HTN, LBBB, HTN, HLD presented with low SpO2 at home. Pt states that she did not feel that her lower extremity swelling improved since being home. She was in bed all day florentin and did not wake up until later in the day. She took her SpO2 which was 53% on room air. Reports lower extremity swelling, abdominal swelling, facial swelling, increased thirst. Does not feel that she snores or is fatigued during the day time. Has not had a bowel movement in a few days. Denies fevers, chills, chest pain, N/V, diarrhea.    Prior admission:   - 3/14/23: PAINETR -> hypoxia to 60's -> Bipap -> Acute CHF and COPD exacerbation      ER course: SpO2 53% -> % on Bipap -> 93% on 3L O2. Labs: neutrophils 82%, K+ 5.5, glucose 148, AST 67, BNP 8970. VBG: pH 7.26, CO2 81, HCO3 36 -> ABG: pH 7.35, CO2 61, HCO3 24. COVID, influenza A/B, RSV negative. EKG: NSR with HR 86 bpm, PVCs,  ms (personally reviewed). CTA: No pulmonary embolus. Small right and trace left pleural effusions with adjacent compressive atelectasis. Small volume upper abdominal ascites. Cholithiasis.     Pt was given 250 ml of NS, lasix 40 mg IVP, Ipatropium nebulizer, magnesium sulfate, 125 mg IVP Solumedrol. She is being placed on telemetry observation for further management.  (10 Apr 2023 02:51)    Cardiology consulted to evaluate for HFpEF, severe pHTN plan for RHC. Pt; was admitted 3/9-3/13/23 for HFpEF exacerb, now p/w with decreased O2 Sat at home likely due to HFpEF, COPD and severe pHTN,   at present on Venti Mask. Pt's outpatient cardiologist is Bhupendra Joiner in [place].     4/11/23: Pt sitting up in chair.  +SOB but mildly improved.  +Bilat edema L>R  Tele: sinus rhythm PVC's, APC's, vent couplets  4/12/23: feels better, Tele: NSR 70s  4/13/23: less SOB; Tele: NSR 80 no events - can dc tele, CardioMEMS today   4/14/23: on venti mask, dyspnea not much changed, Tele: NSR 70s, s/p RHC yesterday, no CardioMEMS inserted   4/15/23: On nasal cannula oxygen sat 94% OOB to chair, pt feels her symptoms are improving.Pt is motivated to improve health in subacute if necessary. Off lasix gtts 2/2 hypotension. Tele overnight 3-4 second of AIVR / current SR 60-80 with occasional PVC     MEDICATIONS  (STANDING):  albuterol    90 MICROgram(s) HFA Inhaler 2 Puff(s) Inhalation every 4 hours  aspirin enteric coated 81 milliGRAM(s) Oral daily  atorvastatin 20 milliGRAM(s) Oral at bedtime  budesonide 160 MICROgram(s)/formoterol 4.5 MICROgram(s) Inhaler 2 Puff(s) Inhalation two times a day  cholecalciferol 1000 Unit(s) Oral daily  cyanocobalamin 1000 MICROGram(s) Oral daily  dextrose 5%. 1000 milliLiter(s) (50 mL/Hr) IV Continuous <Continuous>  dextrose 5%. 1000 milliLiter(s) (100 mL/Hr) IV Continuous <Continuous>  dextrose 50% Injectable 25 Gram(s) IV Push once  dextrose 50% Injectable 12.5 Gram(s) IV Push once  dextrose 50% Injectable 25 Gram(s) IV Push once  furosemide Infusion 10 mG/Hr (5 mL/Hr) IV Continuous <Continuous>  glucagon  Injectable 1 milliGRAM(s) IntraMuscular once  heparin   Injectable 5000 Unit(s) SubCutaneous every 12 hours  insulin glargine Injectable (LANTUS) 20 Unit(s) SubCutaneous at bedtime  insulin lispro (ADMELOG) corrective regimen sliding scale   SubCutaneous three times a day before meals  insulin lispro (ADMELOG) corrective regimen sliding scale   SubCutaneous at bedtime  insulin lispro Injectable (ADMELOG) 6 Unit(s) SubCutaneous three times a day before meals  losartan 100 milliGRAM(s) Oral daily  magnesium oxide 400 milliGRAM(s) Oral daily  methylPREDNISolone sodium succinate Injectable 40 milliGRAM(s) IV Push every 8 hours  metoprolol succinate ER 50 milliGRAM(s) Oral daily  nystatin Powder 1 Application(s) Topical two times a day  polyethylene glycol 3350 17 Gram(s) Oral daily  senna 2 Tablet(s) Oral at bedtime  tiotropium 2.5 MICROgram(s) Inhaler 2 Puff(s) Inhalation daily    MEDICATIONS  (PRN):  acetaminophen     Tablet .. 650 milliGRAM(s) Oral every 6 hours PRN Temp greater or equal to 38C (100.4F), Mild Pain (1 - 3)  aluminum hydroxide/magnesium hydroxide/simethicone Suspension 30 milliLiter(s) Oral every 4 hours PRN Dyspepsia  bisacodyl 5 milliGRAM(s) Oral every 12 hours PRN Constipation  dextrose Oral Gel 15 Gram(s) Oral once PRN Blood Glucose LESS THAN 70 milliGRAM(s)/deciliter  melatonin 3 milliGRAM(s) Oral at bedtime PRN Insomnia  ondansetron Injectable 4 milliGRAM(s) IV Push every 8 hours PRN Nausea and/or Vomiting    Vital Signs Last 24 Hrs  T(C): 36.7 (15 Apr 2023 09:10), Max: 36.8 (14 Apr 2023 17:21)  T(F): 98 (15 Apr 2023 09:10), Max: 98.2 (14 Apr 2023 17:21)  HR: 104 (15 Apr 2023 09:10) (62 - 104)  BP: 106/57 (15 Apr 2023 09:10) (83/47 - 131/60)  BP(mean): 62 (15 Apr 2023 03:49) (56 - 62)  RR: 18 (15 Apr 2023 09:10) (18 - 18)  SpO2: 92% (15 Apr 2023 09:33) (90% - 98%)    Parameters below as of 15 Apr 2023 09:33  Patient On (Oxygen Delivery Method): nasal cannula  O2 Flow (L/min): 4.5      PHYSICAL EXAM:    General: Awake and alert, No acute distress.   Skin: Warm, dry, and pink.   Eyes: Pupils equal and reactive to light. No conjunctival injection, discharge, or scleral icterus.   HEENT: Atraumatic, normocephalic.    Cardiology: Normal S1, S2. +SHELDON. Regular rate and rhythm.   Respiratory: Decreased breath sounds with exp wheeze noted    Gastrointestinal: Positive bowel sounds. Soft, non-tender, non-distended.    Extremities: 2+ peripheral edema bilaterally L>R   Neurological: A+Ox3     LABS:                        11.8   7.84  )-----------( 213      ( 15 Apr 2023 07:41 )             43.2   04-15    140  |  99  |  79<H>  ----------------------------<  82  4.2   |  41<H>  |  1.02    Ca    9.6      15 Apr 2023 07:41  Mg     2.1     04-15                          11.9   6.57  )-----------( 213      ( 14 Apr 2023 08:35 )             43.1     04-14    141  |  103  |  88<H>  ----------------------------<  222<H>  4.8   |  38<H>  |  1.11    Ca    9.5      14 Apr 2023 08:35      - TroponinI hsT: <-36.31, <-34.35                          12.2   6.97  )-----------( 265      ( 12 Apr 2023 07:47 )             44.7     04-13    137  |  102  |  102<H>  ----------------------------<  228<H>  5.0   |  34<H>  |  1.45<H>    Ca    9.4      13 Apr 2023 08:19  Mg     2.7     04-12    TPro  6.7  /  Alb  3.2<L>  /  TBili  0.4  /  DBili  x   /  AST  25  /  ALT  34  /  AlkPhos  77  04-12    - TroponinI hsT: <-36.31, <-34.35                        12.4   6.84  )-----------( 243      ( 11 Apr 2023 08:07 )             43.8                           12.2   4.07  )-----------( 299      ( 10 Apr 2023 08:24 )             42.6                         13.0   4.76  )-----------( 332      ( 09 Apr 2023 17:42 )             46.2     04-11    137  |  102  |  76<H>  ----------------------------<  197<H>  5.4<H>   |  34<H>  |  1.54<H>    Ca    9.7      11 Apr 2023 08:07  Mg     2.8     04-11    TPro  7.1  /  Alb  3.3  /  TBili  0.4  /  DBili  x   /  AST  37  /  ALT  35  /  AlkPhos  88  04-11      10 Apr 2023 08:24    136    |  102    |  65     ----------------------------<  178    5.1     |  30     |  1.21   10 Apr 2023 04:00    x      |  x      |  x      ----------------------------<  180    x       |  x      |  x      09 Apr 2023 17:42    139    |  102    |  59     ----------------------------<  148    5.5     |  34     |  1.18     Ca    9.2        10 Apr 2023 08:24  Ca    9.4        09 Apr 2023 17:42  Mg     2.7       10 Apr 2023 08:24  Mg     2.5       09 Apr 2023 17:42    TPro  7.1    /  Alb  3.4    /  TBili  0.6    /  DBili  x      /  AST  51     /  ALT  40     /  AlkPhos  100    10 Apr 2023 08:24  TPro  7.6    /  Alb  3.7    /  TBili  0.9    /  DBili  x      /  AST  67     /  ALT  40     /  AlkPhos  115    09 Apr 2023 17:42    Radiology/Echocardiogram    < from: Xray Abdomen 2 Views (04.10.23 @ 03:43) >  IMPRESSION:  1. Technically limited study due to both motion and patient's body   habitus.  2. Atelectasis in the right middle and lower lobe of the right lung is   present.  3. Nonspecific mildly dilated centrally located small bowel loops and   partial aeration of the proximal left colon.  4. Chronic degenerative changes of the lumbar spine and right greater   than left hips. 5. AVN of the right hip cannot be excluded from the study.    < end of copied text >          -------------------------------------------------------------------------------------------  < from: TTE Echo Complete w/ Contrast w/ Doppler (03.10.23 @ 10:02) >     Impression     Summary     Endocardium is not well visualized, however, overall left ventricular   systolic function appears normal. Technically Difficult Study.   Septal flattening is seen; this finding is consistent with right heart   pressure / volume overload.   Estimated left ventricular ejection fraction is 55-60 %.   Normal appearing left atrium.   The right atrium appears moderately dilated.   The right ventricle is severely dilated.   The right ventricular apex appears hypokinetic.   The aortic valve is trileaflet with thin pliable leaflets.   Moderate mitral annular calcification is present.   There is calcification of mitral valve leaflets. The leaflet opening is   normal.   EA reversal of the mitral inflow consistent with reduced compliance of   the   left ventricle.   Trace mitral regurgitation is present.   The tricuspid valve leaflets are thin and pliable; valve motion is   normal.   Moderate (2+) tricuspid valve regurgitation is present.   Severe pulmonary hypertension.   Pulmonic valve not well seen.   No evidence of pericardial effusion.   No evidence of pleural effusion.   IVC is dilated and not collapsing with inspiration.     Signature     ----------------------------------------------------------------   Electronically signed by Lyssa Horta MD(Interpreting   physician) on 03/10/2023 10:44 AM   ----------------------------------------------------------------    < end of copied text >

## 2023-04-15 NOTE — PROGRESS NOTE ADULT - ASSESSMENT
MEDICATIONS  (STANDING):  albuterol    90 MICROgram(s) HFA Inhaler 2 Puff(s) Inhalation every 4 hours  aspirin enteric coated 81 milliGRAM(s) Oral daily  atorvastatin 20 milliGRAM(s) Oral at bedtime  budesonide 160 MICROgram(s)/formoterol 4.5 MICROgram(s) Inhaler 2 Puff(s) Inhalation two times a day  cholecalciferol 1000 Unit(s) Oral daily  cyanocobalamin 1000 MICROGram(s) Oral daily  dextrose 5%. 1000 milliLiter(s) (50 mL/Hr) IV Continuous <Continuous>  dextrose 5%. 1000 milliLiter(s) (100 mL/Hr) IV Continuous <Continuous>  dextrose 50% Injectable 25 Gram(s) IV Push once  dextrose 50% Injectable 12.5 Gram(s) IV Push once  dextrose 50% Injectable 25 Gram(s) IV Push once  furosemide    Tablet 40 milliGRAM(s) Oral two times a day  glucagon  Injectable 1 milliGRAM(s) IntraMuscular once  heparin   Injectable 5000 Unit(s) SubCutaneous every 12 hours  insulin glargine Injectable (LANTUS) 20 Unit(s) SubCutaneous at bedtime  insulin lispro (ADMELOG) corrective regimen sliding scale   SubCutaneous three times a day before meals  insulin lispro (ADMELOG) corrective regimen sliding scale   SubCutaneous at bedtime  insulin lispro Injectable (ADMELOG) 6 Unit(s) SubCutaneous three times a day before meals  losartan 100 milliGRAM(s) Oral daily  magnesium oxide 400 milliGRAM(s) Oral daily  metoprolol succinate ER 50 milliGRAM(s) Oral daily  nystatin Powder 1 Application(s) Topical two times a day  polyethylene glycol 3350 17 Gram(s) Oral daily  predniSONE   Tablet 60 milliGRAM(s) Oral two times a day  senna 2 Tablet(s) Oral at bedtime  tiotropium 2.5 MICROgram(s) Inhaler 2 Puff(s) Inhalation daily    MEDICATIONS  (PRN):  acetaminophen     Tablet .. 650 milliGRAM(s) Oral every 6 hours PRN Temp greater or equal to 38C (100.4F), Mild Pain (1 - 3)  aluminum hydroxide/magnesium hydroxide/simethicone Suspension 30 milliLiter(s) Oral every 4 hours PRN Dyspepsia  bisacodyl 5 milliGRAM(s) Oral every 12 hours PRN Constipation  dextrose Oral Gel 15 Gram(s) Oral once PRN Blood Glucose LESS THAN 70 milliGRAM(s)/deciliter  melatonin 3 milliGRAM(s) Oral at bedtime PRN Insomnia  ondansetron Injectable 4 milliGRAM(s) IV Push every 8 hours PRN Nausea and/or Vomiting      69 y/o F presented with low SpO2 at home      1. Acute hypoxic and hypercapnic respiratory distress likely secondary to acute CHF exacerbation, COPD exacerbation, severe pulmonary HTN, CARRINGTON and obesity hypoventilation syndrome   - Telemetry/continuous pulse ox   - SpO2 53% -> % on Bipap -> 93% on 50% venti mask ->titrate down to nasal canula as tolerated.   - c/w Bipap QHS and PRN   -  pt is overloaded on exam, CTA: b/l effusions, abd ascites   - ECHO (3/10/23): EF 55-60%, RA and RV dilation, moderate 2+ tricuspid regurgitation, severe pulmonary HTN   - s/p 40 mg IVP lasix in the ER, continue with 40 mg IVP TID, on lasix gtt as below -> Lost IV access (4/15). Cardiology evaluated->switched to PO lasix(4/15)  - c/w home medications: beta blocker and ARB . STOP HCTZ/ norvasc  - Strict I+Os, daily weights   - 2L H20 restriction, 2g sodium restriction      - Albuterol Q4H standing   - c/w Spiriva and Symbicort   - s/p 125 mg of Solumedrol, c/w Solumedrol 40 mg Q8H and taper down ->switched to PO prednisone (4/15)  - Cardiology consult/recs  - Pulmonology consult/recs   - Advanced heart failure team following   - Pt went for CardioMEMS 4/13, procedure aborted 2/2 inability to tolerate procedure table 2/2 increase back and shoulder pain, patient moving on table and contaminating sterility, device not placed     #Hyperkalemia .Stable  - K+ 5.5 -> 5.1 -> 5.4 -> 4.7 -> 5.0, s/p lokelma, give PRN  - trend K+       DMII with Hyperglycemia  - HbA1c - 7.3  - Carb restriction on diet   - C/w Lantus Ademelog TIDAC, moderate dose ISS ->titrate accordingly     #Constipation   - c/w bowel regimen     # History of COPD (on nocturnal home O2), Type 2 DM, pulmonary HTN, LBBB, HTN, HLD   - c/w home medications; verified with pt at the bedside  - Cholithiasis -> f/u with PCP outpt   - Hold Nisoldipine given lower extremity swelling     DVT ppx: Heparin 5,000 units Q12H (hold for PLT < 50,000)       Prior discussion/GOC in documenation:  "Code status: DNR/DNI (Completed MOLST form with the pt at the bedside. The patient is A+Ox3 at the time of my evaluation and has full capacity. Able to make an informed decision and understands risks associated with decisions made. Pt is DNR/DNI, no feeding tube. Agreeable to IVF, IV abx, trial of Bipap. MOLST completed and placed into chart).   Emergency contact: Randy Fiore (son [phone]) or Manish Fiore (son [phone]) "

## 2023-04-16 LAB
ANION GAP SERPL CALC-SCNC: <2 MMOL/L — LOW (ref 5–17)
BUN SERPL-MCNC: 70 MG/DL — HIGH (ref 7–23)
CALCIUM SERPL-MCNC: 9.3 MG/DL — SIGNIFICANT CHANGE UP (ref 8.5–10.1)
CHLORIDE SERPL-SCNC: 96 MMOL/L — SIGNIFICANT CHANGE UP (ref 96–108)
CO2 SERPL-SCNC: >45 MMOL/L — CRITICAL HIGH (ref 22–31)
CREAT SERPL-MCNC: 0.93 MG/DL — SIGNIFICANT CHANGE UP (ref 0.5–1.3)
EGFR: 66 ML/MIN/1.73M2 — SIGNIFICANT CHANGE UP
GLUCOSE BLDC GLUCOMTR-MCNC: 249 MG/DL — HIGH (ref 70–99)
GLUCOSE BLDC GLUCOMTR-MCNC: 327 MG/DL — HIGH (ref 70–99)
GLUCOSE BLDC GLUCOMTR-MCNC: 341 MG/DL — HIGH (ref 70–99)
GLUCOSE BLDC GLUCOMTR-MCNC: 352 MG/DL — HIGH (ref 70–99)
GLUCOSE SERPL-MCNC: 264 MG/DL — HIGH (ref 70–99)
MAGNESIUM SERPL-MCNC: 1.9 MG/DL — SIGNIFICANT CHANGE UP (ref 1.6–2.6)
NT-PROBNP SERPL-SCNC: 6470 PG/ML — HIGH (ref 0–125)
PHOSPHATE SERPL-MCNC: 3.6 MG/DL — SIGNIFICANT CHANGE UP (ref 2.5–4.5)
POTASSIUM SERPL-MCNC: 3.9 MMOL/L — SIGNIFICANT CHANGE UP (ref 3.5–5.3)
POTASSIUM SERPL-SCNC: 3.9 MMOL/L — SIGNIFICANT CHANGE UP (ref 3.5–5.3)
SODIUM SERPL-SCNC: 143 MMOL/L — SIGNIFICANT CHANGE UP (ref 135–145)

## 2023-04-16 PROCEDURE — 99233 SBSQ HOSP IP/OBS HIGH 50: CPT

## 2023-04-16 PROCEDURE — 99232 SBSQ HOSP IP/OBS MODERATE 35: CPT

## 2023-04-16 RX ORDER — INSULIN GLARGINE 100 [IU]/ML
23 INJECTION, SOLUTION SUBCUTANEOUS AT BEDTIME
Refills: 0 | Status: DISCONTINUED | OUTPATIENT
Start: 2023-04-16 | End: 2023-04-18

## 2023-04-16 RX ORDER — INSULIN LISPRO 100/ML
7 VIAL (ML) SUBCUTANEOUS
Refills: 0 | Status: DISCONTINUED | OUTPATIENT
Start: 2023-04-16 | End: 2023-04-18

## 2023-04-16 RX ADMIN — Medication 3 MILLIGRAM(S): at 22:37

## 2023-04-16 RX ADMIN — ALBUTEROL 2 PUFF(S): 90 AEROSOL, METERED ORAL at 20:11

## 2023-04-16 RX ADMIN — LOSARTAN POTASSIUM 100 MILLIGRAM(S): 100 TABLET, FILM COATED ORAL at 09:45

## 2023-04-16 RX ADMIN — NYSTATIN CREAM 1 APPLICATION(S): 100000 CREAM TOPICAL at 22:34

## 2023-04-16 RX ADMIN — SENNA PLUS 2 TABLET(S): 8.6 TABLET ORAL at 22:37

## 2023-04-16 RX ADMIN — Medication 60 MILLIGRAM(S): at 09:44

## 2023-04-16 RX ADMIN — Medication 40 MILLIGRAM(S): at 06:05

## 2023-04-16 RX ADMIN — HEPARIN SODIUM 5000 UNIT(S): 5000 INJECTION INTRAVENOUS; SUBCUTANEOUS at 09:46

## 2023-04-16 RX ADMIN — Medication 40 MILLIGRAM(S): at 15:57

## 2023-04-16 RX ADMIN — Medication 4: at 09:08

## 2023-04-16 RX ADMIN — Medication 10: at 17:58

## 2023-04-16 RX ADMIN — HEPARIN SODIUM 5000 UNIT(S): 5000 INJECTION INTRAVENOUS; SUBCUTANEOUS at 22:37

## 2023-04-16 RX ADMIN — Medication 60 MILLIGRAM(S): at 22:37

## 2023-04-16 RX ADMIN — Medication 6 UNIT(S): at 17:57

## 2023-04-16 RX ADMIN — INSULIN GLARGINE 23 UNIT(S): 100 INJECTION, SOLUTION SUBCUTANEOUS at 22:37

## 2023-04-16 RX ADMIN — Medication 4: at 22:38

## 2023-04-16 RX ADMIN — ALBUTEROL 2 PUFF(S): 90 AEROSOL, METERED ORAL at 16:20

## 2023-04-16 RX ADMIN — ATORVASTATIN CALCIUM 20 MILLIGRAM(S): 80 TABLET, FILM COATED ORAL at 22:37

## 2023-04-16 RX ADMIN — NYSTATIN CREAM 1 APPLICATION(S): 100000 CREAM TOPICAL at 06:05

## 2023-04-16 RX ADMIN — PREGABALIN 1000 MICROGRAM(S): 225 CAPSULE ORAL at 09:46

## 2023-04-16 RX ADMIN — Medication 6 UNIT(S): at 09:08

## 2023-04-16 RX ADMIN — MAGNESIUM OXIDE 400 MG ORAL TABLET 400 MILLIGRAM(S): 241.3 TABLET ORAL at 09:52

## 2023-04-16 RX ADMIN — TIOTROPIUM BROMIDE 2 PUFF(S): 18 CAPSULE ORAL; RESPIRATORY (INHALATION) at 09:29

## 2023-04-16 RX ADMIN — BUDESONIDE AND FORMOTEROL FUMARATE DIHYDRATE 2 PUFF(S): 160; 4.5 AEROSOL RESPIRATORY (INHALATION) at 20:12

## 2023-04-16 RX ADMIN — BUDESONIDE AND FORMOTEROL FUMARATE DIHYDRATE 2 PUFF(S): 160; 4.5 AEROSOL RESPIRATORY (INHALATION) at 09:29

## 2023-04-16 RX ADMIN — Medication 8: at 12:54

## 2023-04-16 RX ADMIN — ALBUTEROL 2 PUFF(S): 90 AEROSOL, METERED ORAL at 12:30

## 2023-04-16 RX ADMIN — Medication 6 UNIT(S): at 12:53

## 2023-04-16 RX ADMIN — Medication 1000 UNIT(S): at 09:44

## 2023-04-16 RX ADMIN — Medication 50 MILLIGRAM(S): at 09:45

## 2023-04-16 RX ADMIN — Medication 81 MILLIGRAM(S): at 09:44

## 2023-04-16 RX ADMIN — ALBUTEROL 2 PUFF(S): 90 AEROSOL, METERED ORAL at 09:30

## 2023-04-16 NOTE — PROVIDER CONTACT NOTE (CRITICAL VALUE NOTIFICATION) - SITUATION
Pt. received from [address] [place] [address] Alert and oriented x 4- Pt. in 50% VM Sating @ 98%-- Pt. currently Tachypneic and using respiratory muscles- Dr. Shah to the bedside to examine patient with NP Pearl Brasher. Oxygen decreased to 4-6L NC---> Patient sating between 91-92 %- Pending MEMS Procedure today. Will cont to monitor patient closely. Safety maintained
Patient Alert and oriented x 4-- States to feel "tired" Pt. saturation decreasing to high 80s - NP Anshu and SADIE Walter made aware and to the bedside-- Pt. switched to 40% Venti Mask- Respiratory to the bedside and Duoneb administered as ordered. Pt. sating 94% @ this time- Cardiac monitor remains in place- Will cont to monitor patient closely- Safety maintained
CO2 45

## 2023-04-16 NOTE — PROGRESS NOTE ADULT - PROBLEM SELECTOR PLAN 1
Hypoxia/SOB likely multifactorial with known h/o HFpEF/COPD/CARRINGTON/obesity/pHTN/ elevated BNP  CXR There is a small right effusion/atelectasis Lungs clear   Echo from 3/23 shows preserved LVEF 55-60%, with severely dilated RV, severe pHTN and moderate TR     RHC on 4/13 showed elevated pressures indicative for severe pHTN, CardioMMES not implanted as pt. did not tolerate     CW O2 supplement, Optimize management of CHF CW GDMT with BB/ARB/PO lasix ( hypotensive with IV lasix )   Increase activity as tolerated/weight reduction and Daily weight    Tele AR AIVR CW BB

## 2023-04-16 NOTE — PROGRESS NOTE ADULT - SUBJECTIVE AND OBJECTIVE BOX
Chief Complaint: Patient is a 68y old  Female who presents with a chief complaint of Low SpO2 (10 Apr 2023 11:42)      Interval events:   - Reports dyspnea unchanged, remains on venti mask 40% FiO2   - S/p IV lasix 80mg x1 and started on Lasix gtt @10ml/hr per advanced heart failure team     4/15: Lost IV access, Unable to obtain. Cardiology evaluated->Switched to PO lasix. Switched solumedrol to PO prednisone. Respiratory status relatively stable. Continue to monitor closely.     4/16: Reports improvement in SOB and energy while on PO regimen. Continue to monitor. PT evaluation. [facility] vs home care. Card/Pulm recs.     ROS:   Patient denies any current chest pain, cough, f/c/n/v/d, abd pain, myalgias, dysuria, HA, dizziness  All other review of systems is negative unless indicated above    Physical Exam:  Vital Signs Last 24 Hrs  T(C): 37 (04-16-23 @ 10:38), Max: 37.2 (04-15-23 @ 20:53)  HR: 78 (04-16-23 @ 16:20) (64 - 137)  BP: 130/67 (04-16-23 @ 10:38) (115/91 - 132/69)  RR: 18 (04-16-23 @ 10:38) (18 - 19)  SpO2: 94% (04-16-23 @ 16:20) (90% - 95%)    Constitutional: NAD, awake and alert, obese female; Frail  HEENT: PERRL, EOMI, MMM  Respiratory: dec BS at bases / expiratory wheeze improving; Normal respiratory efort  Cardiovascular: S1 and S2, RRR, no murmurs, gallops or rubs  Gastrointestinal: +BS, soft, non-tender, non-distended, no CVA tenderness  Extremities: +LE edema b/l, +DP pulses b/l  Neurological: A&O x 3, no focal deficits  Musculoskeletal: 5/5 strength b/l upper and lower extremities  Skin: Normal, skin warm and dry    Labs:                          11.8   7.84  )-----------( 213      ( 15 Apr 2023 07:41 )             43.2     04-16    143  |  96  |  70<H>  ----------------------------<  264<H>  3.9   |  >45<HH>  |  0.93    Ca    9.3      16 Apr 2023 08:00  Phos  3.6     04-16  Mg     1.9     04-16      SARS-CoV-2: NotDetec (09 Apr 2023 17:42)  SARS-CoV-2: NotDete (09 Mar 2023 21:48)    CAPILLARY BLOOD GLUCOSE      POCT Blood Glucose.: 352 mg/dL (16 Apr 2023 17:16)  POCT Blood Glucose.: 327 mg/dL (16 Apr 2023 12:40)  POCT Blood Glucose.: 249 mg/dL (16 Apr 2023 08:12)  POCT Blood Glucose.: 356 mg/dL (15 Apr 2023 21:24)                            11.8   7.84  )-----------( 213      ( 15 Apr 2023 07:41 )             43.2     04-15    140  |  99  |  79<H>  ----------------------------<  82  4.2   |  41<H>  |  1.02    Ca    9.6      15 Apr 2023 07:41  Mg     2.1     04-15      [facility] (09 Apr 2023 17:42)  SARS-CoV-2: [facility] (09 Mar 2023 21:48)    CAPILLARY BLOOD GLUCOSE      POCT Blood Glucose.: 215 mg/dL (15 Apr 2023 13:02)  POCT Blood Glucose.: 162 mg/dL (15 Apr 2023 08:37)  POCT Blood Glucose.: 172 mg/dL (14 Apr 2023 21:13)      04-13-23 @ 08:19  Glucose 228 [70 - 99]  CO2 total 34 [22 - 31]  Chloride 102 [96 - 108]  Sodium 137 [135 - 145]  Potassium 5.0 [3.5 - 5.3]  Calcium 9.4 [8.5 - 10.1]  Creatinine 1.45 [0.50 - 1.30]   [7 - 23]  eGFR 39  Anion gap 1 [5 - 17]  AST --  ALT --  Alk phos --  Albumin --    04-12-23 @ 07:47  Glucose 198 [70 - 99]  CO2 total 38 [22 - 31]  Chloride 101 [96 - 108]  Sodium 141 [135 - 145]  Potassium 4.7 [3.5 - 5.3]  Calcium 9.4 [8.5 - 10.1]  Creatinine 1.58 [0.50 - 1.30]  BUN 80 [7 - 23]  eGFR 35  Anion gap 2 [5 - 17]  AST 25 [15 - 37]  ALT 34 [12 - 78]  Alk phos 77 [40 - 120]  Albumin 3.2 [3.3 - 5.0]    WBC 6.97 [3.80 - 10.50]  Hemoglobin 12.2 [11.5 - 15.5]  Hematocrit 44.7 [34.5 - 45.0]  Platelets 265 [150 - 400]      04-11-23 @ 08:07  Glucose 197 [70 - 99]  CO2 total 34 [22 - 31]  Chloride 102 [96 - 108]  Sodium 137 [135 - 145]  Potassium 5.4 [3.5 - 5.3]  Calcium 9.7 [8.5 - 10.1]  Creatinine 1.54 [0.50 - 1.30]  BUN 76 [7 - 23]  eGFR 36  Anion gap 1 [5 - 17]  AST 37 [15 - 37]  ALT 35 [12 - 78]  Alk phos 88 [40 - 120]  Albumin 3.3 [3.3 - 5.0]    WBC 6.84 [3.80 - 10.50]  Hemoglobin 12.4 [11.5 - 15.5]  Hematocrit 43.8 [34.5 - 45.0]  Platelets 243 [150 - 400]      04-10 @ 08:24  Glucose 178 mg/dL  HCO3 30 mmol/L  Chloride 102 mmol/L  Sodium 136 mmol/L>   Potassium 5.1 mmol/L  Creatinine 1.21 mg/dL  Calcium 9.2 mg/dL  BUN 65 mg/dL  eGFR 48 mL/min/1.73m2  Anion gap 4 mmol/L    WBC 4.07  Hemoglobin 12.2  Hemoatocrit 42.6  Platelet count 299          Cardiac testing:  Troponin I, High Sensitivity Result: 36.31 ng/L (04-09-23 @ 21:05)  Troponin I, High Sensitivity Result: 34.35 ng/L (04-09-23 @ 17:42)        12 Lead ECG:   Ventricular Rate 86 BPM    Atrial Rate 86 BPM    P-R Interval 162 ms    QRS Duration 126 ms    Q-T Interval 406 ms    QTC Calculation(Bazett) 485 ms    P Axis 65 degrees    R Axis 230 degrees    T Axis 86 degrees    Diagnosis Line Sinus rhythm withfrequent Premature ventricular complexes  Indeterminate axis  Intraventricular conduction block  Possible Anterolateral infarct (cited on or before 16-FEB-2016)  Abnormal ECG  When compared with ECG of 10-MAR-2023 07:38,  Premature ventricular complexes are now Present  QRS axis Shifted left  Confirmed by CORNELIUS MELENDEZ ([id number]) on 4/10/2023 4:54:07 PM (04-09-23 @ 17:49)      TTE Echo Complete w/ Contrast w/ Doppler:   PROCEDURE DATE:  03/10/2023      INTERPRETATION:  Transthoracic Echocardiography Report (TTE)   Impression     Summary     Endocardium is not well visualized, however, overall left ventricular   systolic function appears normal. Technically Difficult Study.   Septal flattening is seen; this finding is consistent with right heart   pressure / volume overload.   Estimated left ventricular ejection fraction is 55-60 %.   Normal appearing left atrium.   The right atriumappears moderately dilated.   The right ventricle is severely dilated.   The right ventricular apex appears hypokinetic.   The aortic valve is trileaflet with thin pliable leaflets.   Moderate mitral annular calcification is present.   There is calcification of mitral valve leaflets. The leaflet opening is   normal.   EA reversal of the mitral inflow consistent with reduced compliance of   the   left ventricle.   Trace mitral regurgitation is present.   The tricuspid valve leaflets are thin and pliable; valve motion is   normal.   Moderate (2+) tricuspid valve regurgitation is present.   Severe pulmonary hypertension.   Pulmonic valve not well seen.   No evidence of pericardial effusion.   No evidence of pleural effusion.   IVC is dilated andnot collapsing with inspiration.     Signature     ----------------------------------------------------------------   Electronically signed by Lyssa Horta MD(Interpreting   physician) on 03/10/2023 10:44 AM   ----------------------------------------------------------------      Radiology:  < from: CT Angio Chest PE Protocol w/ IV Cont (04.09.23 @ 21:49) >    IMPRESSION:  No pulmonary embolus.    Small right and trace left pleural effusions with adjacent compressive   atelectasis.    Small volume upper abdominal ascites.    < end of copied text >

## 2023-04-16 NOTE — PROGRESS NOTE ADULT - SUBJECTIVE AND OBJECTIVE BOX
JOHANA MCDERMOTT  MRN: [id number]    S: 2023: Events noted. Patient unable to tolerate positioning for cardiomems yesterday. Patient sleeping; on BiPAP. Arousable. Feels "better". Breathing comfortably on BiPAP.    4/15/2023: Awake/alert. Sitting up in chair. Denies shortness of breath. Still requiring Ventimask to maintain adequate O2 sat.     2023: No complaints. Now on [place] O2 3.5 LPM with O2 sat > 90% at rest. Tolerated BiPAP for several hours during the night.       PAST MEDICAL & SURGICAL HISTORY:    COPD, severe    H/O pulmonary hypertension      Hypertension      History of left bundle branch block (LBBB)      DM (diabetes mellitus), type 2      Hyperlipidemia      S/P           O: T(C): 36.5 (23 @ 07:47), Max: 37.2 (04-15-23 @ 20:53)  HR: 92 (23 @ 09:30) (64 - 137)  BP: 115/91 (23 @ 07:47) (109/51 - 132/69)  RR: 18 (23 @ 07:47) (18 - 19)  SpO2: 91% (23 @ 09:30) (90% - 95%)  Wt(kg): --    PHYSICAL EXAM:      GENERAL: comfortable at rest    NEURO: awake / alert    NECK: no JVD    CHEST: clear     CARDIAC: RR    EXT: edema      LABS:                          11.8   7.84  )-----------( 213      ( 15 Apr 2023 07:41 )             43.2           143  |  96  |  70<H>  ----------------------------<  264<H>  3.9   |  >45<HH>  |  0.93    Ca    9.3      2023 08:00  Phos  3.6       Mg     1.9           RADIOLOGY:      EXAM:  CT ANGIO CHEST PULM ART [facility]   ORDERED BY: RON MARCELINO     PROCEDURE DATE:  2023          INTERPRETATION:  CLINICAL INFORMATION: Hypoxia and shortness of breath    COMPARISON: CT chest 2016.    CONTRAST/COMPLICATIONS:  IV Contrast: Omnipaque 350  80 cc administered   20 cc discarded  Oral Contrast: NONE  Complications: None reported at time of study completion    PROCEDURE:  CT Angiography of the Chest.  Sagittal and coronal reformats were performed as wellas 3D (MIP)   reconstructions.    FINDINGS:    LUNGS AND AIRWAYS: Patent central airways. Compressive atelectasis in the   right middle and bilateral lower lobes adjacent to pleural effusions.   Linear type scarring in the right upper lobe. PLEURA: Small right and   trace left pleural effusions.  MEDIASTINUM AND MARTINEZ: No lymphadenopathy.  VESSELS: No pulmonary embolus. Atherosclerotic calcifications of the   aorta and coronary arteries..  HEART: Mild cardiomegaly. No pericardial effusion.  CHEST WALL AND LOWER NECK: Within normal limits.  VISUALIZED UPPER ABDOMEN: Small volume upper abdominal ascites, grossly   unchanged when compared to prior study. Cholelithiasis.  BONES: Degenerative changes of the spine.    IMPRESSION:  No pulmonary embolus.    Small right and trace left pleural effusions with adjacent compressive   atelectasis.    Small volume upper abdominal ascites.      MEDICATIONS  (STANDING):  albuterol    90 MICROgram(s) HFA Inhaler 2 Puff(s) Inhalation every 4 hours  aspirin enteric coated 81 milliGRAM(s) Oral daily  atorvastatin 20 milliGRAM(s) Oral at bedtime  budesonide 160 MICROgram(s)/formoterol 4.5 MICROgram(s) Inhaler 2 Puff(s) Inhalation two times a day  cholecalciferol 1000 Unit(s) Oral daily  cyanocobalamin 1000 MICROGram(s) Oral daily  dextrose 5%. 1000 milliLiter(s) (50 mL/Hr) IV Continuous <Continuous>  dextrose 5%. 1000 milliLiter(s) (100 mL/Hr) IV Continuous <Continuous>  dextrose 50% Injectable 25 Gram(s) IV Push once  dextrose 50% Injectable 12.5 Gram(s) IV Push once  dextrose 50% Injectable 25 Gram(s) IV Push once  furosemide    Tablet 40 milliGRAM(s) Oral two times a day  glucagon  Injectable 1 milliGRAM(s) IntraMuscular once  heparin   Injectable 5000 Unit(s) SubCutaneous every 12 hours  insulin glargine Injectable (LANTUS) 23 Unit(s) SubCutaneous at bedtime  insulin lispro (ADMELOG) corrective regimen sliding scale   SubCutaneous three times a day before meals  insulin lispro (ADMELOG) corrective regimen sliding scale   SubCutaneous at bedtime  insulin lispro Injectable (ADMELOG) 6 Unit(s) SubCutaneous three times a day before meals  losartan 100 milliGRAM(s) Oral daily  magnesium oxide 400 milliGRAM(s) Oral daily  metoprolol succinate ER 50 milliGRAM(s) Oral daily  nystatin Powder 1 Application(s) Topical two times a day  polyethylene glycol 3350 17 Gram(s) Oral daily  predniSONE   Tablet 60 milliGRAM(s) Oral two times a day  senna 2 Tablet(s) Oral at bedtime  tiotropium 2.5 MICROgram(s) Inhaler 2 Puff(s) Inhalation daily    MEDICATIONS  (PRN):  acetaminophen     Tablet .. 650 milliGRAM(s) Oral every 6 hours PRN Temp greater or equal to 38C (100.4F), Mild Pain (1 - 3)  aluminum hydroxide/magnesium hydroxide/simethicone Suspension 30 milliLiter(s) Oral every 4 hours PRN Dyspepsia  bisacodyl 5 milliGRAM(s) Oral every 12 hours PRN Constipation  dextrose Oral Gel 15 Gram(s) Oral once PRN Blood Glucose LESS THAN 70 milliGRAM(s)/deciliter  melatonin 3 milliGRAM(s) Oral at bedtime PRN Insomnia  ondansetron Injectable 4 milliGRAM(s) IV Push every 8 hours PRN Nausea and/or Vomiting        A/P:  1. Hypoxic / hypercapneic respiratory failure.    2. COPD.     3. CHF.     4. Pulmonary HTN - group 2 and 3    5. CARRINGTON.     6. Obesity hypoventilation.     PLAN: Continue NIPPV qHS; O2 supplement to maintain O2 sat > 89%. Optimize tx for CHF per heart-failure team. Continue inhaled LABA/LAMA/ICS. Consider decrease IV steroids. DVT prophylaxis. Increase activity as tolerated. Will benefit from PT.

## 2023-04-16 NOTE — PROGRESS NOTE ADULT - ASSESSMENT
MEDICATIONS  (STANDING):  albuterol    90 MICROgram(s) HFA Inhaler 2 Puff(s) Inhalation every 4 hours  aspirin enteric coated 81 milliGRAM(s) Oral daily  atorvastatin 20 milliGRAM(s) Oral at bedtime  budesonide 160 MICROgram(s)/formoterol 4.5 MICROgram(s) Inhaler 2 Puff(s) Inhalation two times a day  cholecalciferol 1000 Unit(s) Oral daily  cyanocobalamin 1000 MICROGram(s) Oral daily  dextrose 5%. 1000 milliLiter(s) (50 mL/Hr) IV Continuous <Continuous>  dextrose 5%. 1000 milliLiter(s) (100 mL/Hr) IV Continuous <Continuous>  dextrose 50% Injectable 25 Gram(s) IV Push once  dextrose 50% Injectable 12.5 Gram(s) IV Push once  dextrose 50% Injectable 25 Gram(s) IV Push once  furosemide    Tablet 40 milliGRAM(s) Oral two times a day  glucagon  Injectable 1 milliGRAM(s) IntraMuscular once  heparin   Injectable 5000 Unit(s) SubCutaneous every 12 hours  insulin glargine Injectable (LANTUS) 20 Unit(s) SubCutaneous at bedtime  insulin lispro (ADMELOG) corrective regimen sliding scale   SubCutaneous three times a day before meals  insulin lispro (ADMELOG) corrective regimen sliding scale   SubCutaneous at bedtime  insulin lispro Injectable (ADMELOG) 6 Unit(s) SubCutaneous three times a day before meals  losartan 100 milliGRAM(s) Oral daily  magnesium oxide 400 milliGRAM(s) Oral daily  metoprolol succinate ER 50 milliGRAM(s) Oral daily  nystatin Powder 1 Application(s) Topical two times a day  polyethylene glycol 3350 17 Gram(s) Oral daily  predniSONE   Tablet 60 milliGRAM(s) Oral two times a day  senna 2 Tablet(s) Oral at bedtime  tiotropium 2.5 MICROgram(s) Inhaler 2 Puff(s) Inhalation daily    MEDICATIONS  (PRN):  acetaminophen     Tablet .. 650 milliGRAM(s) Oral every 6 hours PRN Temp greater or equal to 38C (100.4F), Mild Pain (1 - 3)  aluminum hydroxide/magnesium hydroxide/simethicone Suspension 30 milliLiter(s) Oral every 4 hours PRN Dyspepsia  bisacodyl 5 milliGRAM(s) Oral every 12 hours PRN Constipation  dextrose Oral Gel 15 Gram(s) Oral once PRN Blood Glucose LESS THAN 70 milliGRAM(s)/deciliter  melatonin 3 milliGRAM(s) Oral at bedtime PRN Insomnia  ondansetron Injectable 4 milliGRAM(s) IV Push every 8 hours PRN Nausea and/or Vomiting      69 y/o F presented with low SpO2 at home      1. Acute hypoxic and hypercapnic respiratory distress likely secondary to acute CHF exacerbation, COPD exacerbation, severe pulmonary HTN, CARRINGTON and obesity hypoventilation syndrome   - Telemetry/continuous pulse ox   - SpO2 53% -> % on Bipap -> 93% on 50% venti mask ->titrate down to nasal canula as tolerated.   - c/w Bipap QHS and PRN   -  pt is overloaded on exam, CTA: b/l effusions, abd ascites   - ECHO (3/10/23): EF 55-60%, RA and RV dilation, moderate 2+ tricuspid regurgitation, severe pulmonary HTN   - s/p 40 mg IVP lasix in the ER, continue with 40 mg IVP TID, on lasix gtt as below -> Lost IV access (4/15). Cardiology evaluated->switched to PO lasix(4/15)  - c/w home medications: beta blocker and ARB . STOP HCTZ/ norvasc  - Strict I+Os, daily weights   - 2L H20 restriction, 2g sodium restriction      - Albuterol Q4H standing   - c/w Spiriva and Symbicort   - s/p 125 mg of Solumedrol, c/w Solumedrol 40 mg Q8H and taper down ->switched to PO prednisone (4/15)  - Cardiology consult/recs  - Pulmonology consult/recs   - Advanced heart failure team following   - Pt went for CardioMEMS 4/13, procedure aborted 2/2 inability to tolerate procedure table 2/2 increase back and shoulder pain, patient moving on table and contaminating sterility, device not placed     #Hyperkalemia .Stable  - K+ 5.5 -> 5.1 -> 5.4 -> 4.7 -> 5.0, s/p lokelma, give PRN  - trend K+       DMII with Hyperglycemia  - HbA1c - 7.3  - Carb restriction on diet   - C/w Lantus Ademelog TIDAC, moderate dose ISS ->titrate accordingly     #Constipation   - c/w bowel regimen     # History of COPD (on nocturnal home O2), Type 2 DM, pulmonary HTN, LBBB, HTN, HLD   - c/w home medications; verified with pt at the bedside  - Cholithiasis -> f/u with PCP outpt   - Hold Nisoldipine given lower extremity swelling     DVT ppx: Heparin 5,000 units Q12H (hold for PLT < 50,000)       Prior discussion/GOC in documenation:  "Code status: DNR/DNI (Completed MOLST form with the pt at the bedside. The patient is A+Ox3 at the time of my evaluation and has full capacity. Able to make an informed decision and understands risks associated with decisions made. Pt is DNR/DNI, no feeding tube. Agreeable to IVF, IV abx, trial of Bipap. MOLST completed and placed into chart).   Emergency contact: Randy Fiore (son [phone]) or Manish Fiore (son [phone]) "

## 2023-04-16 NOTE — PROGRESS NOTE ADULT - SUBJECTIVE AND OBJECTIVE BOX
CHIEF COMPLAINT: CHF  HPI:  69 y/o F with PMH COPD (on 3L O2 PRN), Type 2 DM, pulmonary HTN, LBBB, HTN, HLD presented with low SpO2 at home. Pt states that she did not feel that her lower extremity swelling improved since being home. She was in bed all day florentin and did not wake up until later in the day. She took her SpO2 which was 53% on room air. Reports lower extremity swelling, abdominal swelling, facial swelling, increased thirst. Does not feel that she snores or is fatigued during the day time. Has not had a bowel movement in a few days. Denies fevers, chills, chest pain, N/V, diarrhea.    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 Labs: neutrophils 82%, K+ 5.5, glucose 148, AST 67, BNP 8970. VBG: pH 7.26, CO2 81, HCO3 36 -> ABG: pH 7.35, CO2 61, HCO3 24. COVID, influenza A/B, RSV negative. EKG: NSR with HR 86 bpm, PVCs,  ms (personally reviewed). CTA: No pulmonary embolus. Small right and trace left pleural effusions with adjacent compressive atelectasis. Small volume upper abdominal ascites. Cholithiasis.     Pt was given 250 ml of NS, lasix 40 mg IVP, Ipatropium nebulizer, magnesium sulfate, 125 mg IVP Solumedrol. She is being placed on telemetry observation for further management.  (10 Apr 2023 02:51)    Cardiology consulted to evaluate for HFpEF, severe pHTN plan for RHC. Pt; was admitted 3/9-3/13/23 for HFpEF exacerb, now p/w with decreased O2 Sat at home likely due to HFpEF, COPD and severe pHTN,   at present on Venti Mask. Pt's outpatient cardiologist is Bhupendra Joiner in [place].     4/11/23: Pt sitting up in chair.  +SOB but mildly improved.  +Bilat edema L>R  Tele: sinus rhythm PVC's, APC's, vent couplets  4/12/23: feels better, Tele: NSR 70s  4/13/23: less SOB; Tele: NSR 80 no events - can dc tele, CardioMEMS today   4/14/23: on venti mask, dyspnea not much changed, Tele: NSR 70s, s/p RHC yesterday, no CardioMEMS inserted   4/15/23: On nasal cannula oxygen sat 94% OOB to chair, pt feels her symptoms are improving.Pt is motivated to improve health in subacute if necessary. Off lasix gtts 2/2 hypotension. Tele overnight 3-4 second of AIVR / current SR 60-80 with occasional PVC   4/16/22 Seated in bedside chair awake alert appears comfortable tele SR SR/AIVR      MEDICATIONS  (STANDING):  albuterol    90 MICROgram(s) HFA Inhaler 2 Puff(s) Inhalation every 4 hours  aspirin enteric coated 81 milliGRAM(s) Oral daily  atorvastatin 20 milliGRAM(s) Oral at bedtime  budesonide 160 MICROgram(s)/formoterol 4.5 MICROgram(s) Inhaler 2 Puff(s) Inhalation two times a day  cholecalciferol 1000 Unit(s) Oral daily  cyanocobalamin 1000 MICROGram(s) Oral daily  dextrose 5%. 1000 milliLiter(s) (50 mL/Hr) IV Continuous <Continuous>  dextrose 5%. 1000 milliLiter(s) (100 mL/Hr) IV Continuous <Continuous>  dextrose 50% Injectable 25 Gram(s) IV Push once  dextrose 50% Injectable 12.5 Gram(s) IV Push once  dextrose 50% Injectable 25 Gram(s) IV Push once  furosemide    Tablet 40 milliGRAM(s) Oral two times a day  glucagon  Injectable 1 milliGRAM(s) IntraMuscular once  heparin   Injectable 5000 Unit(s) SubCutaneous every 12 hours  insulin glargine Injectable (LANTUS) 23 Unit(s) SubCutaneous at bedtime  insulin lispro (ADMELOG) corrective regimen sliding scale   SubCutaneous three times a day before meals  insulin lispro (ADMELOG) corrective regimen sliding scale   SubCutaneous at bedtime  insulin lispro Injectable (ADMELOG) 6 Unit(s) SubCutaneous three times a day before meals  losartan 100 milliGRAM(s) Oral daily  magnesium oxide 400 milliGRAM(s) Oral daily  metoprolol succinate ER 50 milliGRAM(s) Oral daily  nystatin Powder 1 Application(s) Topical two times a day  polyethylene glycol 3350 17 Gram(s) Oral daily  predniSONE   Tablet 60 milliGRAM(s) Oral two times a day  senna 2 Tablet(s) Oral at bedtime  tiotropium 2.5 MICROgram(s) Inhaler 2 Puff(s) Inhalation daily    MEDICATIONS  (PRN):  acetaminophen     Tablet .. 650 milliGRAM(s) Oral every 6 hours PRN Temp greater or equal to 38C (100.4F), Mild Pain (1 - 3)  aluminum hydroxide/magnesium hydroxide/simethicone Suspension 30 milliLiter(s) Oral every 4 hours PRN Dyspepsia  bisacodyl 5 milliGRAM(s) Oral every 12 hours PRN Constipation  dextrose Oral Gel 15 Gram(s) Oral once PRN Blood Glucose LESS THAN 70 milliGRAM(s)/deciliter  melatonin 3 milliGRAM(s) Oral at bedtime PRN Insomnia  ondansetron Injectable 4 milliGRAM(s) IV Push every 8 hours PRN Nausea and/or Vomiting    Vital Signs Last 24 Hrs  T(C): 36.5 (16 Apr 2023 07:47), Max: 37.2 (15 Apr 2023 20:53)  T(F): 97.7 (16 Apr 2023 07:47), Max: 98.9 (15 Apr 2023 20:53)  HR: 92 (16 Apr 2023 09:30) (64 - 137)  BP: 115/91 (16 Apr 2023 07:47) (109/51 - 132/69)  BP(mean): 88 (16 Apr 2023 05:55) (88 - 88)  RR: 18 (16 Apr 2023 07:47) (18 - 19)  SpO2: 91% (16 Apr 2023 09:30) (90% - 95%)    Parameters below as of 16 Apr 2023 09:30  Patient On (Oxygen Delivery Method): nasal cannula      PHYSICAL EXAM:    General: Awake and alert, No acute distress.   Skin: Warm, dry, and pink.   Eyes: Pupils equal and reactive to light. No conjunctival injection, discharge, or scleral icterus.   HEENT: Atraumatic, normocephalic.    Cardiology: Normal S1, S2. RRR  Respiratory: Decreased breath sounds with exp wheeze noted    Gastrointestinal: Abd soft non-tender   Extremities: 2+ peripheral edema bilaterally  Neurological: A+Ox3     LABS:                        11.8   7.84  )-----------( 213      ( 15 Apr 2023 07:41 )             43.2   04-15    140  |  99  |  79<H>  ----------------------------<  82  4.2   |  41<H>  |  1.02    Ca    9.6      15 Apr 2023 07:41  Mg     2.1     04-15                          11.9   6.57  )-----------( 213      ( 14 Apr 2023 08:35 )             43.1     04-14    141  |  103  |  88<H>  ----------------------------<  222<H>  4.8   |  38<H>  |  1.11    Ca    9.5      14 Apr 2023 08:35      - TroponinI hsT: <-36.31, <-34.35                          12.2   6.97  )-----------( 265      ( 12 Apr 2023 07:47 )             44.7     04-13    137  |  102  |  102<H>  ----------------------------<  228<H>  5.0   |  34<H>  |  1.45<H>    Ca    9.4      13 Apr 2023 08:19  Mg     2.7     04-12    TPro  6.7  /  Alb  3.2<L>  /  TBili  0.4  /  DBili  x   /  AST  25  /  ALT  34  /  AlkPhos  77  04-12    - TroponinI hsT: <-36.31, <-34.35                        12.4   6.84  )-----------( 243      ( 11 Apr 2023 08:07 )             43.8                           12.2   4.07  )-----------( 299      ( 10 Apr 2023 08:24 )             42.6                         13.0   4.76  )-----------( 332      ( 09 Apr 2023 17:42 )             46.2     04-11    137  |  102  |  76<H>  ----------------------------<  197<H>  5.4<H>   |  34<H>  |  1.54<H>    Ca    9.7      11 Apr 2023 08:07  Mg     2.8     04-11    TPro  7.1  /  Alb  3.3  /  TBili  0.4  /  DBili  x   /  AST  37  /  ALT  35  /  AlkPhos  88  04-11      10 Apr 2023 08:24    136    |  102    |  65     ----------------------------<  178    5.1     |  30     |  1.21   10 Apr 2023 04:00    x      |  x      |  x      ----------------------------<  180    x       |  x      |  x      09 Apr 2023 17:42    139    |  102    |  59     ----------------------------<  148    5.5     |  34     |  1.18     Ca    9.2        10 Apr 2023 08:24  Ca    9.4        09 Apr 2023 17:42  Mg     2.7       10 Apr 2023 08:24  Mg     2.5       09 Apr 2023 17:42    TPro  7.1    /  Alb  3.4    /  TBili  0.6    /  DBili  x      /  AST  51     /  ALT  40     /  AlkPhos  100    10 Apr 2023 08:24  TPro  7.6    /  Alb  3.7    /  TBili  0.9    /  DBili  x      /  AST  67     /  ALT  40     /  AlkPhos  115    09 Apr 2023 17:42    Radiology/Echocardiogram    < from: Xray Abdomen 2 Views (04.10.23 @ 03:43) >  IMPRESSION:  1. Technically limited study due to both motion and patient's body   habitus.  2. Atelectasis in the right middle and lower lobe of the right lung is   present.  3. Nonspecific mildly dilated centrally located small bowel loops and   partial aeration of the proximal left colon.  4. Chronic degenerative changes of the lumbar spine and right greater   than left hips. 5. AVN of the right hip cannot be excluded from the study.    < end of copied text >          -------------------------------------------------------------------------------------------  < from: TTE Echo Complete w/ Contrast w/ Doppler (03.10.23 @ 10:02) >     Impression     Summary     Endocardium is not well visualized, however, overall left ventricular   systolic function appears normal. Technically Difficult Study.   Septal flattening is seen; this finding is consistent with right heart   pressure / volume overload.   Estimated left ventricular ejection fraction is 55-60 %.   Normal appearing left atrium.   The right atrium appears moderately dilated.   The right ventricle is severely dilated.   The right ventricular apex appears hypokinetic.   The aortic valve is trileaflet with thin pliable leaflets.   Moderate mitral annular calcification is present.   There is calcification of mitral valve leaflets. The leaflet opening is   normal.   EA reversal of the mitral inflow consistent with reduced compliance of   the   left ventricle.   Trace mitral regurgitation is present.   The tricuspid valve leaflets are thin and pliable; valve motion is   normal.   Moderate (2+) tricuspid valve regurgitation is present.   Severe pulmonary hypertension.   Pulmonic valve not well seen.   No evidence of pericardial effusion.   No evidence of pleural effusion.   IVC is dilated and not collapsing with inspiration.     Signature     ----------------------------------------------------------------   Electronically signed by Lyssa Horta MD(Interpreting   physician) on 03/10/2023 10:44 AM   ----------------------------------------------------------------    < end of copied text >

## 2023-04-17 DIAGNOSIS — I50.30 UNSPECIFIED DIASTOLIC (CONGESTIVE) HEART FAILURE: ICD-10-CM

## 2023-04-17 LAB
ANION GAP SERPL CALC-SCNC: 3 MMOL/L — LOW (ref 5–17)
BUN SERPL-MCNC: 61 MG/DL — HIGH (ref 7–23)
CALCIUM SERPL-MCNC: 9.3 MG/DL — SIGNIFICANT CHANGE UP (ref 8.5–10.1)
CHLORIDE SERPL-SCNC: 93 MMOL/L — LOW (ref 96–108)
CO2 SERPL-SCNC: 42 MMOL/L — HIGH (ref 22–31)
CREAT SERPL-MCNC: 0.86 MG/DL — SIGNIFICANT CHANGE UP (ref 0.5–1.3)
EGFR: 73 ML/MIN/1.73M2 — SIGNIFICANT CHANGE UP
GLUCOSE BLDC GLUCOMTR-MCNC: 193 MG/DL — HIGH (ref 70–99)
GLUCOSE BLDC GLUCOMTR-MCNC: 204 MG/DL — HIGH (ref 70–99)
GLUCOSE BLDC GLUCOMTR-MCNC: 212 MG/DL — HIGH (ref 70–99)
GLUCOSE BLDC GLUCOMTR-MCNC: 356 MG/DL — HIGH (ref 70–99)
GLUCOSE SERPL-MCNC: 272 MG/DL — HIGH (ref 70–99)
POTASSIUM SERPL-MCNC: 4 MMOL/L — SIGNIFICANT CHANGE UP (ref 3.5–5.3)
POTASSIUM SERPL-SCNC: 4 MMOL/L — SIGNIFICANT CHANGE UP (ref 3.5–5.3)
SODIUM SERPL-SCNC: 138 MMOL/L — SIGNIFICANT CHANGE UP (ref 135–145)

## 2023-04-17 PROCEDURE — 99233 SBSQ HOSP IP/OBS HIGH 50: CPT

## 2023-04-17 PROCEDURE — 93308 TTE F-UP OR LMTD: CPT | Mod: 26

## 2023-04-17 RX ORDER — SACUBITRIL AND VALSARTAN 24; 26 MG/1; MG/1
24-26 TABLET, FILM COATED ORAL TWICE DAILY
Qty: 60 | Refills: 3 | Status: ACTIVE | COMMUNITY
Start: 2023-04-17 | End: 1900-01-01

## 2023-04-17 RX ORDER — FUROSEMIDE 40 MG
40 TABLET ORAL EVERY 8 HOURS
Refills: 0 | Status: DISCONTINUED | OUTPATIENT
Start: 2023-04-17 | End: 2023-04-18

## 2023-04-17 RX ORDER — SACUBITRIL AND VALSARTAN 24; 26 MG/1; MG/1
1 TABLET, FILM COATED ORAL
Refills: 0 | Status: DISCONTINUED | OUTPATIENT
Start: 2023-04-18 | End: 2023-04-18

## 2023-04-17 RX ADMIN — ALBUTEROL 2 PUFF(S): 90 AEROSOL, METERED ORAL at 00:17

## 2023-04-17 RX ADMIN — Medication 7 UNIT(S): at 17:22

## 2023-04-17 RX ADMIN — INSULIN GLARGINE 23 UNIT(S): 100 INJECTION, SOLUTION SUBCUTANEOUS at 22:31

## 2023-04-17 RX ADMIN — Medication 50 MILLIGRAM(S): at 09:45

## 2023-04-17 RX ADMIN — TIOTROPIUM BROMIDE 2 PUFF(S): 18 CAPSULE ORAL; RESPIRATORY (INHALATION) at 08:41

## 2023-04-17 RX ADMIN — Medication 3 MILLIGRAM(S): at 22:29

## 2023-04-17 RX ADMIN — Medication 40 MILLIGRAM(S): at 06:28

## 2023-04-17 RX ADMIN — BUDESONIDE AND FORMOTEROL FUMARATE DIHYDRATE 2 PUFF(S): 160; 4.5 AEROSOL RESPIRATORY (INHALATION) at 21:35

## 2023-04-17 RX ADMIN — BUDESONIDE AND FORMOTEROL FUMARATE DIHYDRATE 2 PUFF(S): 160; 4.5 AEROSOL RESPIRATORY (INHALATION) at 08:40

## 2023-04-17 RX ADMIN — Medication 7 UNIT(S): at 14:43

## 2023-04-17 RX ADMIN — Medication 40 MILLIGRAM(S): at 14:39

## 2023-04-17 RX ADMIN — Medication 60 MILLIGRAM(S): at 09:45

## 2023-04-17 RX ADMIN — Medication 10: at 17:23

## 2023-04-17 RX ADMIN — NYSTATIN CREAM 1 APPLICATION(S): 100000 CREAM TOPICAL at 17:25

## 2023-04-17 RX ADMIN — Medication 7 UNIT(S): at 08:14

## 2023-04-17 RX ADMIN — LOSARTAN POTASSIUM 100 MILLIGRAM(S): 100 TABLET, FILM COATED ORAL at 09:49

## 2023-04-17 RX ADMIN — Medication 1000 UNIT(S): at 09:46

## 2023-04-17 RX ADMIN — Medication 40 MILLIGRAM(S): at 22:30

## 2023-04-17 RX ADMIN — ALBUTEROL 2 PUFF(S): 90 AEROSOL, METERED ORAL at 15:34

## 2023-04-17 RX ADMIN — HEPARIN SODIUM 5000 UNIT(S): 5000 INJECTION INTRAVENOUS; SUBCUTANEOUS at 09:47

## 2023-04-17 RX ADMIN — Medication 81 MILLIGRAM(S): at 09:45

## 2023-04-17 RX ADMIN — POLYETHYLENE GLYCOL 3350 17 GRAM(S): 17 POWDER, FOR SOLUTION ORAL at 09:46

## 2023-04-17 RX ADMIN — MAGNESIUM OXIDE 400 MG ORAL TABLET 400 MILLIGRAM(S): 241.3 TABLET ORAL at 09:46

## 2023-04-17 RX ADMIN — ATORVASTATIN CALCIUM 20 MILLIGRAM(S): 80 TABLET, FILM COATED ORAL at 22:29

## 2023-04-17 RX ADMIN — PREGABALIN 1000 MICROGRAM(S): 225 CAPSULE ORAL at 09:46

## 2023-04-17 RX ADMIN — Medication 4: at 08:15

## 2023-04-17 RX ADMIN — ALBUTEROL 2 PUFF(S): 90 AEROSOL, METERED ORAL at 08:40

## 2023-04-17 RX ADMIN — Medication 60 MILLIGRAM(S): at 22:29

## 2023-04-17 RX ADMIN — NYSTATIN CREAM 1 APPLICATION(S): 100000 CREAM TOPICAL at 06:28

## 2023-04-17 RX ADMIN — ALBUTEROL 2 PUFF(S): 90 AEROSOL, METERED ORAL at 21:34

## 2023-04-17 NOTE — PROGRESS NOTE ADULT - ACCEPTING BEDSIDE PROVIDER ATTESTATION:
I have reviewed and verified the documentation from the telehealth provider and made amendments were necessary.I have discussed the case with the telehealth physician
I have reviewed and verified the documentation from the telehealth provider and made amendments were necessary.I have discussed the case with the telehealth physician

## 2023-04-17 NOTE — PROGRESS NOTE ADULT - REASON FOR ADMISSION
Low SpO2

## 2023-04-17 NOTE — PROGRESS NOTE ADULT - PROBLEM SELECTOR PLAN 3
Problem: H/O left bundle branch block.  Stable
Problem: H/O left bundle branch block.  Stable
Problem: H/O left bundle branch block.
Stable
Stable
Stable. No new orders at this time.
Problem: H/O left bundle branch block.  Stable

## 2023-04-17 NOTE — PROGRESS NOTE ADULT - PROBLEM SELECTOR PROBLEM 3
H/O left bundle branch block

## 2023-04-17 NOTE — PROGRESS NOTE ADULT - ASSESSMENT
69 y/o F with HFpEF PMH HTN, HLD, COPD (on 3L O2 PRN), pHTN, LBBB, DM, who presented to  ED on 4/9 for dyspnea, LE edema, and weight gain.  She was recently admitted for similar symptoms and discharged on 3/14, she appears near euvolemic on exam with low normal BP      4/13:  for cardiomems today, Cr improving with BUN continuing to rise   4/14: Unable to tolerate cardiomems yesterday.  Filling pressures significantly elevated.   4/17:  Unable ot tolerate lasix gtt due to SBP of 80s, UOP of 2L daily over the weekend         TTE 4/10/23: LVEF 55-60%, septal flattening noted,  LA Normal, RV severely dilated with RV apex appearing hypokinetic, RA moderately dilated,  trace MR, moderate TR, RVSP 70, IVC dilated   RHC 4/13/23: RA 30 RV 96/21 PA 91/42 (62) PA Sat 47.9% CO 5.27 CI 2.35 procedure stopped tomi to patient discomfort.

## 2023-04-17 NOTE — PROGRESS NOTE ADULT - SUBJECTIVE AND OBJECTIVE BOX
Subjective:    Feeling well reports breathing and swelling improving slightly     Medications:  acetaminophen     Tablet .. 650 milliGRAM(s) Oral every 6 hours PRN  albuterol    90 MICROgram(s) HFA Inhaler 2 Puff(s) Inhalation every 4 hours  aluminum hydroxide/magnesium hydroxide/simethicone Suspension 30 milliLiter(s) Oral every 4 hours PRN  aspirin enteric coated 81 milliGRAM(s) Oral daily  atorvastatin 20 milliGRAM(s) Oral at bedtime  bisacodyl 5 milliGRAM(s) Oral every 12 hours PRN  budesonide 160 MICROgram(s)/formoterol 4.5 MICROgram(s) Inhaler 2 Puff(s) Inhalation two times a day  cholecalciferol 1000 Unit(s) Oral daily  cyanocobalamin 1000 MICROGram(s) Oral daily  dextrose 5%. 1000 milliLiter(s) IV Continuous <Continuous>  dextrose 5%. 1000 milliLiter(s) IV Continuous <Continuous>  dextrose 50% Injectable 25 Gram(s) IV Push once  dextrose 50% Injectable 12.5 Gram(s) IV Push once  dextrose 50% Injectable 25 Gram(s) IV Push once  dextrose Oral Gel 15 Gram(s) Oral once PRN  furosemide    Tablet 40 milliGRAM(s) Oral every 8 hours  glucagon  Injectable 1 milliGRAM(s) IntraMuscular once  heparin   Injectable 5000 Unit(s) SubCutaneous every 12 hours  insulin glargine Injectable (LANTUS) 23 Unit(s) SubCutaneous at bedtime  insulin lispro (ADMELOG) corrective regimen sliding scale   SubCutaneous three times a day before meals  insulin lispro (ADMELOG) corrective regimen sliding scale   SubCutaneous at bedtime  insulin lispro Injectable (ADMELOG) 7 Unit(s) SubCutaneous three times a day before meals  losartan 100 milliGRAM(s) Oral daily  magnesium oxide 400 milliGRAM(s) Oral daily  melatonin 3 milliGRAM(s) Oral at bedtime PRN  metoprolol succinate ER 50 milliGRAM(s) Oral daily  nystatin Powder 1 Application(s) Topical two times a day  ondansetron Injectable 4 milliGRAM(s) IV Push every 8 hours PRN  polyethylene glycol 3350 17 Gram(s) Oral daily  predniSONE   Tablet 60 milliGRAM(s) Oral two times a day  senna 2 Tablet(s) Oral at bedtime  tiotropium 2.5 MICROgram(s) Inhaler 2 Puff(s) Inhalation daily      Physical Exam:    Vitals:  Vital Signs Last 24 Hours  T(C): 36.7 (23 @ 08:43), Max: 36.7 (23 @ 06:25)  HR: 66 (23 @ 08:43) (66 - 79)  BP: 134/73 (23 @ 08:43) (119/60 - 134/73)  RR: 19 (23 @ 08:43) (19 - 19)  SpO2: 95% (23 @ 08:43) (92% - 95%)    Weight in k.3 ( @ 06:25)    I&O's Summary    2023 07:01  -  2023 07:00  --------------------------------------------------------  IN: 0 mL / OUT: 1200 mL / NET: -1200 mL        Tele: Sinus 70-80s    General: No distress. Comfortable.  HEENT: EOM intact.  Neck: Neck supple. JVP 12 cm with V waves . No masses  Chest: Clear to auscultation bilaterally  CV: Normal S1 and S2. No murmurs, rub, or gallops. Radial pulses normal.  Abdomen: Soft, non-distended, non-tender  Skin: No rashes or skin breakdown  Extremities:  Warm, 1-2+ nonpitting LE edema   Neurology: Alert and oriented times three. Sensation intact  Psych: Affect normal    Labs:        138  |  93<L>  |  61<H>  ----------------------------<  272<H>  4.0   |  42<H>  |  0.86    Ca    9.3      2023 09:01  Phos  3.6       Mg     1.9

## 2023-04-17 NOTE — PROGRESS NOTE ADULT - TELEHEALTH PROVIDER ATTESTATION:
I conducted the telehealth visit with patient.I discussed and endorsed patient care to accepting bedside physician
I conducted the telehealth visit with patient.I discussed and endorsed patient care to accepting bedside physician

## 2023-04-17 NOTE — PROGRESS NOTE ADULT - TIME BILLING
Time spent via telehealth interviewing and examining the patient, reviewing the chart, and coordinating care with the primary team.  I counselled patient on the heart failure disease process and the ongoing medical therapy.

## 2023-04-17 NOTE — PROGRESS NOTE ADULT - ASSESSMENT
MEDICATIONS  (STANDING):  albuterol    90 MICROgram(s) HFA Inhaler 2 Puff(s) Inhalation every 4 hours  aspirin enteric coated 81 milliGRAM(s) Oral daily  atorvastatin 20 milliGRAM(s) Oral at bedtime  budesonide 160 MICROgram(s)/formoterol 4.5 MICROgram(s) Inhaler 2 Puff(s) Inhalation two times a day  cholecalciferol 1000 Unit(s) Oral daily  cyanocobalamin 1000 MICROGram(s) Oral daily  dextrose 5%. 1000 milliLiter(s) (50 mL/Hr) IV Continuous <Continuous>  dextrose 5%. 1000 milliLiter(s) (100 mL/Hr) IV Continuous <Continuous>  dextrose 50% Injectable 25 Gram(s) IV Push once  dextrose 50% Injectable 12.5 Gram(s) IV Push once  dextrose 50% Injectable 25 Gram(s) IV Push once  furosemide    Tablet 40 milliGRAM(s) Oral two times a day  glucagon  Injectable 1 milliGRAM(s) IntraMuscular once  heparin   Injectable 5000 Unit(s) SubCutaneous every 12 hours  insulin glargine Injectable (LANTUS) 20 Unit(s) SubCutaneous at bedtime  insulin lispro (ADMELOG) corrective regimen sliding scale   SubCutaneous three times a day before meals  insulin lispro (ADMELOG) corrective regimen sliding scale   SubCutaneous at bedtime  insulin lispro Injectable (ADMELOG) 6 Unit(s) SubCutaneous three times a day before meals  losartan 100 milliGRAM(s) Oral daily  magnesium oxide 400 milliGRAM(s) Oral daily  metoprolol succinate ER 50 milliGRAM(s) Oral daily  nystatin Powder 1 Application(s) Topical two times a day  polyethylene glycol 3350 17 Gram(s) Oral daily  predniSONE   Tablet 60 milliGRAM(s) Oral two times a day  senna 2 Tablet(s) Oral at bedtime  tiotropium 2.5 MICROgram(s) Inhaler 2 Puff(s) Inhalation daily    MEDICATIONS  (PRN):  acetaminophen     Tablet .. 650 milliGRAM(s) Oral every 6 hours PRN Temp greater or equal to 38C (100.4F), Mild Pain (1 - 3)  aluminum hydroxide/magnesium hydroxide/simethicone Suspension 30 milliLiter(s) Oral every 4 hours PRN Dyspepsia  bisacodyl 5 milliGRAM(s) Oral every 12 hours PRN Constipation  dextrose Oral Gel 15 Gram(s) Oral once PRN Blood Glucose LESS THAN 70 milliGRAM(s)/deciliter  melatonin 3 milliGRAM(s) Oral at bedtime PRN Insomnia  ondansetron Injectable 4 milliGRAM(s) IV Push every 8 hours PRN Nausea and/or Vomiting      67 y/o F presented with low SpO2 at home      1. Acute hypoxic and hypercapnic respiratory distress likely secondary to acute CHF exacerbation, COPD exacerbation, severe pulmonary HTN, CARRINGTON and obesity hypoventilation syndrome   - Telemetry/continuous pulse ox   - SpO2 53% -> % on Bipap -> 93% on 50% venti mask ->titrate down to nasal canula as tolerated.   - c/w Bipap QHS and PRN   -  pt is overloaded on exam, CTA: b/l effusions, abd ascites   - ECHO (3/10/23): EF 55-60%, RA and RV dilation, moderate 2+ tricuspid regurgitation, severe pulmonary HTN   - s/p 40 mg IVP lasix in the ER, continue with 40 mg IVP TID, on lasix gtt as below -> Lost IV access (4/15). Cardiology evaluated->switched to PO lasix(4/15)  - c/w home medications: beta blocker and ARB . STOP HCTZ/ norvasc  - Strict I+Os, daily weights   - 2L H20 restriction, 2g sodium restriction      - Albuterol Q4H standing   - c/w Spiriva and Symbicort   - s/p 125 mg of Solumedrol, c/w Solumedrol 40 mg Q8H and taper down ->switched to PO prednisone 60mg BID (4/15)  - Cardiology consult/recs  - Pulmonology consult/recs   - Advanced heart failure team following   - Pt went for CardioMEMS 4/13, procedure aborted 2/2 inability to tolerate procedure table 2/2 increase back and shoulder pain, patient moving on table and contaminating sterility, device not placed   - start entresto, losartan d/c'd    #Hyperkalemia .Stable  - s/p lokelma, give PRN  - trend K+       DMII with Hyperglycemia  - HbA1c - 7.3  - Carb restriction on diet   - C/w Lantus Ademelog TIDAC, moderate dose ISS ->titrate accordingly     #Constipation   - c/w bowel regimen     # History of COPD (on nocturnal home O2), Type 2 DM, pulmonary HTN, LBBB, HTN, HLD   - c/w home medications; verified with pt at the bedside  - Cholithiasis -> f/u with PCP outpt   - Hold Nisoldipine given lower extremity swelling     DVT ppx: Heparin 5,000 units Q12H (hold for PLT < 50,000)     Dispo: D/C planning to [facility] in process     Prior discussion/GOC in documenation:  "Code status: DNR/DNI (Completed MOLST form with the pt at the bedside. The patient is A+Ox3 at the time of my evaluation and has full capacity. Able to make an informed decision and understands risks associated with decisions made. Pt is DNR/DNI, no feeding tube. Agreeable to IVF, IV abx, trial of Bipap. MOLST completed and placed into chart).   Emergency contact: Randy Fiore (son [phone]) or Manish Fiore (son [phone]) "

## 2023-04-17 NOTE — PROGRESS NOTE ADULT - ASSESSMENT
68y old Female with PMH COPD (on 3L O2 PRN), Type 2 DM, pulmonary HTN, LBBB, HTN, HLD wit hypoxic resp failure from COPD exacerbation and acute CHF exacerbation  1. Acute on chronic diastolic congestive heart failure: continue lasix  -could not tolerate cardiomems procedure  -currently on lasix 40 mg BID PO. management per cardiology  -supplemental o2  2. Very severe COPD / acute on chronic hypercarbic respiratory failure with hypoxia: continue symbicort, spiriva  -continue albuterol PRN  -s/p methylprednisolone, now on prednisone 60 mg. continue taper  -pt  3. Pulmonary HTN. Group 2 and group 3 etiology. Optimize treatment for chronic cardiac and pulmonary disease. Continue supplemental O2.   4. ? CARRINGTON / obesity hypoventilation. Consider outpatient eval for CARRINGTON if patient agrees  -bipap overnight  5. pleural effusions: likely from volume overload. medical management

## 2023-04-17 NOTE — PROGRESS NOTE ADULT - SUBJECTIVE AND OBJECTIVE BOX
Subjective:  tolerated bipap overnight  on nasal cannula when seen   denies sob    Review of Systems:  All 10 systems reviewed in detailed and found to be negative with the exception of what has already been described above    Allergies:  latex (Unknown)  No Known Drug Allergies    Meds  MEDICATIONS  (STANDING):  albuterol    90 MICROgram(s) HFA Inhaler 2 Puff(s) Inhalation every 4 hours  aspirin enteric coated 81 milliGRAM(s) Oral daily  atorvastatin 20 milliGRAM(s) Oral at bedtime  budesonide 160 MICROgram(s)/formoterol 4.5 MICROgram(s) Inhaler 2 Puff(s) Inhalation two times a day  cholecalciferol 1000 Unit(s) Oral daily  cyanocobalamin 1000 MICROGram(s) Oral daily  dextrose 5%. 1000 milliLiter(s) (100 mL/Hr) IV Continuous <Continuous>  dextrose 5%. 1000 milliLiter(s) (50 mL/Hr) IV Continuous <Continuous>  dextrose 50% Injectable 25 Gram(s) IV Push once  dextrose 50% Injectable 12.5 Gram(s) IV Push once  dextrose 50% Injectable 25 Gram(s) IV Push once  furosemide    Tablet 40 milliGRAM(s) Oral two times a day  glucagon  Injectable 1 milliGRAM(s) IntraMuscular once  heparin   Injectable 5000 Unit(s) SubCutaneous every 12 hours  insulin glargine Injectable (LANTUS) 23 Unit(s) SubCutaneous at bedtime  insulin lispro (ADMELOG) corrective regimen sliding scale   SubCutaneous at bedtime  insulin lispro (ADMELOG) corrective regimen sliding scale   SubCutaneous three times a day before meals  insulin lispro Injectable (ADMELOG) 7 Unit(s) SubCutaneous three times a day before meals  losartan 100 milliGRAM(s) Oral daily  magnesium oxide 400 milliGRAM(s) Oral daily  metoprolol succinate ER 50 milliGRAM(s) Oral daily  nystatin Powder 1 Application(s) Topical two times a day  polyethylene glycol 3350 17 Gram(s) Oral daily  predniSONE   Tablet 60 milliGRAM(s) Oral two times a day  senna 2 Tablet(s) Oral at bedtime  tiotropium 2.5 MICROgram(s) Inhaler 2 Puff(s) Inhalation daily    MEDICATIONS  (PRN):  acetaminophen     Tablet .. 650 milliGRAM(s) Oral every 6 hours PRN Temp greater or equal to 38C (100.4F), Mild Pain (1 - 3)  aluminum hydroxide/magnesium hydroxide/simethicone Suspension 30 milliLiter(s) Oral every 4 hours PRN Dyspepsia  bisacodyl 5 milliGRAM(s) Oral every 12 hours PRN Constipation  dextrose Oral Gel 15 Gram(s) Oral once PRN Blood Glucose LESS THAN 70 milliGRAM(s)/deciliter  melatonin 3 milliGRAM(s) Oral at bedtime PRN Insomnia  ondansetron Injectable 4 milliGRAM(s) IV Push every 8 hours PRN Nausea and/or Vomiting    Physical Exam  T(C): 36.7 (04-17-23 @ 08:43), Max: 37 (04-16-23 @ 10:38)  HR: 66 (04-17-23 @ 08:43) (66 - 92)  BP: 134/73 (04-17-23 @ 08:43) (119/60 - 134/73)  RR: 19 (04-17-23 @ 08:43) (18 - 19)  SpO2: 95% (04-17-23 @ 08:43) (90% - 95%)  Gen: Alert, oriented, mild distress on venti mask  HEENT: Anicteric sclera, moist mucous membranes  Cardio: Regular rhythm and rate, normal S1S2, no murmurs  Resp: decrease breath sounds poor air movement  GI: Nontender, nondistended, normoactive bowel sounds  Ext: 2+ edema bilaterally  Neuro: Nonfocal    Labs:    04-16    143  |  96  |  70<H>  ----------------------------<  264<H>  3.9   |  >45<HH>  |  0.93    Ca    9.3      16 Apr 2023 08:00  Phos  3.6     04-16  Mg     1.9     04-16      < from: CT Angio Chest PE Protocol w/ IV Cont (04.09.23 @ 21:49) >  ACC: [id number] EXAM:  CT ANGIO CHEST PULM ART [facility]   ORDERED BY: RON MARCELINO     PROCEDURE DATE:  04/09/2023          INTERPRETATION:  CLINICAL INFORMATION: Hypoxia and shortness of breath    COMPARISON: CT chest 2/14/2016.    CONTRAST/COMPLICATIONS:  IV Contrast: Omnipaque 350  80 cc administered   20 cc discarded  Oral Contrast: NONE  Complications: None reported at time of study completion    PROCEDURE:  CT Angiography of the Chest.  Sagittal and coronal reformats were performed as wellas 3D (MIP)   reconstructions.    FINDINGS:    LUNGS AND AIRWAYS: Patent central airways. Compressive atelectasis in the   right middle and bilateral lower lobes adjacent to pleural effusions.   Linear type scarring in the right upper lobe. PLEURA: Small right and   trace left pleural effusions.  MEDIASTINUM AND MARTINEZ: No lymphadenopathy.  VESSELS: No pulmonary embolus. Atherosclerotic calcifications of the   aorta and coronary arteries..  HEART: Mild cardiomegaly. No pericardial effusion.  CHEST WALL AND LOWER NECK: Within normal limits.  VISUALIZED UPPER ABDOMEN: Small volume upper abdominal ascites, grossly   unchanged when compared to prior study. Cholelithiasis.  BONES: Degenerative changes of the spine.    IMPRESSION:  No pulmonary embolus.    Small right and trace left pleural effusions with adjacent compressive   atelectasis.    Small volume upper abdominal ascites.    TTE Echo Complete w/ Contrast w/ Doppler (03.10.23 @ 10:02) >   Impression     Summary     Endocardium is not well visualized, however, overall left ventricular   systolic function appears normal. Technically Difficult Study.   Septal flattening is seen; this finding is consistent with right heart   pressure / volume overload.   Estimated left ventricular ejection fraction is 55-60 %.   Normal appearing left atrium.   The right atrium appears moderately dilated.   The right ventricle is severely dilated.   The right ventricular apex appears hypokinetic.   The aortic valve is trileaflet with thin pliable leaflets.   Moderate mitral annular calcification is present.   There is calcification of mitral valve leaflets. The leaflet opening is   normal.   EA reversal of the mitral inflow consistent with reduced compliance of   the   left ventricle.   Trace mitral regurgitation is present.   The tricuspid valve leaflets are thin and pliable; valve motion is   normal.   Moderate (2+) tricuspid valve regurgitation is present.   Severe pulmonary hypertension.   Pulmonic valve not well seen.   No evidence of pericardial effusion.   No evidence of pleural effusion.   IVC is dilated and not collapsing with inspiration.

## 2023-04-17 NOTE — PROGRESS NOTE ADULT - NS ATTEND AMEND GEN_ALL_CORE FT
Patient with above , did not tolerate IV lasix drip became hypotensive , now improved , change lasix to po bid ,  monitor weight which is stable until now ,  follow up  PRN , call us if needed
Patient with above , did not tolerate IV lasix drip became hypotensive , now improved , change lasix to po bid ,  monitor weight
Case discussed with NP Jemma and CHF-PA (Phuc); recurrent CHF in setting of advanced lung disease with pulmonary HTN; CardioMEMS today; continue current treatment.
Patient with above hx shortness of breath , severe copd , sleep apnea , diastolic heart failure with severe secondary pulmonary hypertension , IV diuresis as recommended heart failure team ,  Not adherent to recommendation
Patient with shortness of breath due to worsening COPD , diastolic heart failure with severe secondary pulmonary hypertension, right heart failure signs , on IV diuresis
Plan of care discussed with NP and HF team. Agree with plan of care
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 Of note she was discharged one month ago for a heart failure exacerbation.    She appears to be euvolemic based on JVD from telehealth camera. Given recent admissions and mixed cardiac/pulmonary picture, a CardioMEMS will be helpful to reduce risk of readmissions and pending for today.    REVIEW OF STUDIES  TTE: LV nondilated, LVEF 55-60%, dilated RV with moderate dysfunction, estimated PASP 71 mmHg, plethoric IVC    PLAN  # Acute on chronic diastolic heart failure  -Suspect her HFpEF is driven by PH related RV dysfunction  -Continue lasix 40 mg PO BID, will reassess based on filling pressures  -Plan for SGLT2i before discharge  -Pending CardioMEMS today as above     # Pulmonary hypertension  -Likely combination of HFpEF, COPD, CARRINGTON  -Given WHO II/III, unlikely to benefit from vasodilators  -PH hemodynamics will be assessed today at time of CardioMEMS
Telehealth inpatient visit:    Started on lasix drip at 10mg/hr on 4/14 but did not tolerate due to hypotension then transitioned back to her oral diuretic regimen.   On limited exam today, JCP with v waves p to mandible but mean around 12cm.  No pitting edema.  Obtain repeat echocardiogram to assess IVC and right ventricular function including TR severeity.  She may require inotrope assisted diuresis.  Unfortunately, patient was unable to tolerate her RHC and PCWP could not be obtained thus we are unable to categorize her pulmonary hypertension.  However, there is likely both a group II and III component.   Therefore, would favor changing her treatment regimen to HFpEF tailed therapy.  Please check if Entresto is covered for her.  If yes would stop losartan and start on mid dose Entresto 49-51mg BID.  Once Entresto is on board, can add back on low dose spironolactone.  Continue lasix 40mg PO BID.  She would benefit from sleep apnea eval.  Will begin screening for amyloid with light chain/SPEP/ IF labs.

## 2023-04-17 NOTE — PROGRESS NOTE ADULT - PROBLEM SELECTOR PLAN 1
- 2/2 pHTN, COPD, obesity   - significantly elevated filling pressures  - Stop losartan   - Start entresto 24-26 mg po BID  - continue lasix 40 mg PO BID  - continue Toprol 50 mg po daily    - will consider resuming spironolactone tomorrow or day after based on K levels, if not will resume as an outpatient   - avoid CCB in outpatient setting with recurrent LE edema   - unable to tolerate cardimems placement   - Spoke to her endo about SGLT2i, can consider as an outpatient when renal function improves,  - Strict I & Os to monitor volume status  - Daily weights

## 2023-04-17 NOTE — PROGRESS NOTE ADULT - PROBLEM SELECTOR PLAN 1
Hypoxia/SOB likely multifactorial with known h/o HFpEF/COPD/CARRINGTON/obesity/pHTN/ elevated BNP  CXR There is a small right effusion/atelectasis Lungs clear   Echo from 3/23 shows preserved LVEF 55-60%, with severely dilated RV, severe pHTN and moderate TR     RHC on 4/13 showed elevated pressures indicative for severe pHTN, CardioMMES not implanted as pt. did not tolerate     Optimize management of CHF CW GDMT with BB- toprol xl 50 mg po daily/ARB - losartan lasix - increase to 40 mg po tid (used to take bid at home), HCTZ on hold - will not restart on dc, daily weight, strict I&Os, fluid restriction 1.5L/day. cont. O2 supplement on NC - O@ Sat improved, off Venti Mask    pt. hemodynamically stable, will sign off, consult prn       Tele AR AIVR ASHLEY ZAMUDIO Hypoxia/SOB likely multifactorial with known h/o HFpEF/COPD/CARRINGTON/obesity/pHTN/ elevated BNP  CXR There is a small right effusion/atelectasis Lungs clear   Echo from 3/23 shows preserved LVEF 55-60%, with severely dilated RV, severe pHTN and moderate TR     RHC on 4/13 showed elevated pressures indicative for severe pHTN, CardioMMES not implanted as pt. did not tolerate     Optimize management of CHF CW GDMT with BB- toprol xl 50 mg po daily/start low dose entresto if covered by insurance - HF Team will follow  diuresis with  lasix - increase to 40 mg po tid (used to take bid at home), HCTZ on hold - will not restart on dc,   daily weight, strict I&Os, fluid restriction 1.5L/day. cont. O2 supplement on NC - O@ Sat improved, off Venti Mask    pt. hemodynamically stable, will sign off, consult prn       Tele AR AIVR CW BB

## 2023-04-17 NOTE — PROGRESS NOTE ADULT - PROBLEM SELECTOR PLAN 2
s/p RHC on 4/13 showing severe pHTN, pt on NC with improving dyspnea, management as per Pulmonology and HF team
·  Problem: Severe pulmonary hypertension.   ·  Recommendation: pt. with severe pHTN on previous Echo from 3/3023  [facility] this admission after fluid overload improves if pt agreeable.
·  Problem: Severe pulmonary hypertension.   ·  Recommendation: pt. with severe pHTN on previous Echo from 3/3023  [facility] this admission after fluid overload improves if pt agreeable.
- Likely group II/III,    - plan as above   - pulmonology following.
·  Problem: Severe pulmonary hypertension.   ·  Recommendation: pt. with severe pHTN on previous Echo from 3/3023  [facility] this admission after fluid overload improves if pt agreeable.
·  Problem: Severe pulmonary hypertension.   ·  Recommendation: s/p RHC on 3/13 showing severe pHTN, pt. still on Venti Mask, with dyspnea, management as per Pulmonology and HF
- Likely group II/III,    - plan as above   - pulmonology following.
s/p RHC on 4/13 showing severe pHTN, pt on NC with improving dyspnea, management as per Pulmonology and HF team
- Likely group II/III,    - plan as above   - pulmonology following; plan for outpatient CARRINGTON studies
s/p RHC on 4/13 showing severe pHTN, pt on NC with improving dyspnea, management as per Pulmonology and HF

## 2023-04-17 NOTE — PROGRESS NOTE ADULT - SUBJECTIVE AND OBJECTIVE BOX
Chief Complaint: Patient is a 68y old  Female who presents with a chief complaint of Low SpO2 (10 Apr 2023 11:42)      Interval events:   4/14:  - Reports dyspnea unchanged, remains on venti mask 40% FiO2   - S/p IV lasix 80mg x1 and started on Lasix gtt @10ml/hr per advanced heart failure team     4/15: Lost IV access, Unable to obtain. Cardiology evaluated->Switched to PO lasix. Switched solumedrol to PO prednisone. Respiratory status relatively stable. Continue to monitor closely.     4/16: Reports improvement in SOB and energy while on PO regimen. Continue to monitor. PT evaluation. [facility] vs home care. Card/Pulm recs.     4/17: Reports continued improvement, VSS on 3-4L NC. Started on entresto, lasix changed to PO TID per advanced heart failure team    ROS:   Patient denies any current chest pain, cough, f/c/n/v/d, abd pain, myalgias, dysuria, HA, dizziness  All other review of systems is negative unless indicated above    Physical Exam:  Vital Signs Last 24 Hrs  T(C): 37 (17 Apr 2023 16:42), Max: 37 (17 Apr 2023 16:42)  T(F): 98.6 (17 Apr 2023 16:42), Max: 98.6 (17 Apr 2023 16:42)  HR: 75 (17 Apr 2023 16:42) (66 - 79)  BP: 115/50 (17 Apr 2023 16:42) (115/50 - 134/73)  BP(mean): 78 (17 Apr 2023 06:25) (74 - 78)  RR: 18 (17 Apr 2023 16:42) (18 - 19)  SpO2: 93% (17 Apr 2023 16:42) (92% - 95%)    Parameters below as of 17 Apr 2023 16:42  Patient On (Oxygen Delivery Method): nasal cannula  O2 Flow (L/min): 3    Constitutional: NAD, awake and alert, obese female; Frail  HEENT: PERRL, EOMI, MMM  Respiratory: dec BS at bases / expiratory wheeze improving; Normal respiratory effort on NC  Cardiovascular: S1 and S2, RRR, no murmurs, gallops or rubs  Gastrointestinal: +BS, soft, non-tender, non-distended, no CVA tenderness  Extremities: +LE edema b/l, +DP pulses b/l  Neurological: A&O x 3, no focal deficits  Musculoskeletal: 5/5 strength b/l upper and lower extremities  Skin: Normal, skin warm and dry    Labs:  04-17-23 @ 09:01  Glucose 272 [70 - 99]  CO2 total 42 [22 - 31]  Chloride 93 [96 - 108]  Sodium 138 [135 - 145]  Potassium 4.0 [3.5 - 5.3]  Calcium 9.3 [8.5 - 10.1]  Creatinine 0.86 [0.50 - 1.30]  BUN 61 [7 - 23]  eGFR 73  Anion gap 3 [5 - 17]  AST --  ALT --  Alk phos --  Albumin --                            11.8   7.84  )-----------( 213      ( 15 Apr 2023 07:41 )             43.2     04-16    143  |  96  |  70<H>  ----------------------------<  264<H>  3.9   |  >45<HH>  |  0.93    Ca    9.3      16 Apr 2023 08:00  Phos  3.6     04-16  Mg     1.9     04-16      SARS-CoV-2: NotDetec (09 Apr 2023 17:42)  SARS-CoV-2: NotDetec (09 Mar 2023 21:48)    CAPILLARY BLOOD GLUCOSE      POCT Blood Glucose.: 352 mg/dL (16 Apr 2023 17:16)  POCT Blood Glucose.: 327 mg/dL (16 Apr 2023 12:40)  POCT Blood Glucose.: 249 mg/dL (16 Apr 2023 08:12)  POCT Blood Glucose.: 356 mg/dL (15 Apr 2023 21:24)                            11.8   7.84  )-----------( 213      ( 15 Apr 2023 07:41 )             43.2     04-15    140  |  99  |  79<H>  ----------------------------<  82  4.2   |  41<H>  |  1.02    Ca    9.6      15 Apr 2023 07:41  Mg     2.1     04-15      SARS-CoV-2: NotDetec (09 Apr 2023 17:42)  SARS-CoV-2: [facility] (09 Mar 2023 21:48)    CAPILLARY BLOOD GLUCOSE      POCT Blood Glucose.: 215 mg/dL (15 Apr 2023 13:02)  POCT Blood Glucose.: 162 mg/dL (15 Apr 2023 08:37)  POCT Blood Glucose.: 172 mg/dL (14 Apr 2023 21:13)      04-13-23 @ 08:19  Glucose 228 [70 - 99]  CO2 total 34 [22 - 31]  Chloride 102 [96 - 108]  Sodium 137 [135 - 145]  Potassium 5.0 [3.5 - 5.3]  Calcium 9.4 [8.5 - 10.1]  Creatinine 1.45 [0.50 - 1.30]   [7 - 23]  eGFR 39  Anion gap 1 [5 - 17]  AST --  ALT --  Alk phos --  Albumin --    04-12-23 @ 07:47  Glucose 198 [70 - 99]  CO2 total 38 [22 - 31]  Chloride 101 [96 - 108]  Sodium 141 [135 - 145]  Potassium 4.7 [3.5 - 5.3]  Calcium 9.4 [8.5 - 10.1]  Creatinine 1.58 [0.50 - 1.30]  BUN 80 [7 - 23]  eGFR 35  Anion gap 2 [5 - 17]  AST 25 [15 - 37]  ALT 34 [12 - 78]  Alk phos 77 [40 - 120]  Albumin 3.2 [3.3 - 5.0]    WBC 6.97 [3.80 - 10.50]  Hemoglobin 12.2 [11.5 - 15.5]  Hematocrit 44.7 [34.5 - 45.0]  Platelets 265 [150 - 400]      04-11-23 @ 08:07  Glucose 197 [70 - 99]  CO2 total 34 [22 - 31]  Chloride 102 [96 - 108]  Sodium 137 [135 - 145]  Potassium 5.4 [3.5 - 5.3]  Calcium 9.7 [8.5 - 10.1]  Creatinine 1.54 [0.50 - 1.30]  BUN 76 [7 - 23]  eGFR 36  Anion gap 1 [5 - 17]  AST 37 [15 - 37]  ALT 35 [12 - 78]  Alk phos 88 [40 - 120]  Albumin 3.3 [3.3 - 5.0]    WBC 6.84 [3.80 - 10.50]  Hemoglobin 12.4 [11.5 - 15.5]  Hematocrit 43.8 [34.5 - 45.0]  Platelets 243 [150 - 400]      04-10 @ 08:24  Glucose 178 mg/dL  HCO3 30 mmol/L  Chloride 102 mmol/L  Sodium 136 mmol/L>   Potassium 5.1 mmol/L  Creatinine 1.21 mg/dL  Calcium 9.2 mg/dL  BUN 65 mg/dL  eGFR 48 mL/min/1.73m2  Anion gap 4 mmol/L    WBC 4.07  Hemoglobin 12.2  Hemoatocrit 42.6  Platelet count 299          Cardiac testing:  Troponin I, High Sensitivity Result: 36.31 ng/L (04-09-23 @ 21:05)  Troponin I, High Sensitivity Result: 34.35 ng/L (04-09-23 @ 17:42)        12 Lead ECG:   Ventricular Rate 86 BPM    Atrial Rate 86 BPM    P-R Interval 162 ms    QRS Duration 126 ms    Q-T Interval 406 ms    QTC Calculation(Bazett) 485 ms    P Axis 65 degrees    R Axis 230 degrees    T Axis 86 degrees    Diagnosis Line Sinus rhythm withfrequent Premature ventricular complexes  Indeterminate axis  Intraventricular conduction block  Possible Anterolateral infarct (cited on or before 16-FEB-2016)  Abnormal ECG  When compared with ECG of 10-MAR-2023 07:38,  Premature ventricular complexes are now Present  QRS axis Shifted left  Confirmed by CORNELIUS MELENDEZ ([id number]) on 4/10/2023 4:54:07 PM (04-09-23 @ 17:49)      TTE Echo Complete w/ Contrast w/ Doppler:   PROCEDURE DATE:  03/10/2023      INTERPRETATION:  Transthoracic Echocardiography Report (TTE)   Impression     Summary     Endocardium is not well visualized, however, overall left ventricular   systolic function appears normal. Technically Difficult Study.   Septal flattening is seen; this finding is consistent with right heart   pressure / volume overload.   Estimated left ventricular ejection fraction is 55-60 %.   Normal appearing left atrium.   The right atriumappears moderately dilated.   The right ventricle is severely dilated.   The right ventricular apex appears hypokinetic.   The aortic valve is trileaflet with thin pliable leaflets.   Moderate mitral annular calcification is present.   There is calcification of mitral valve leaflets. The leaflet opening is   normal.   EA reversal of the mitral inflow consistent with reduced compliance of   the   left ventricle.   Trace mitral regurgitation is present.   The tricuspid valve leaflets are thin and pliable; valve motion is   normal.   Moderate (2+) tricuspid valve regurgitation is present.   Severe pulmonary hypertension.   Pulmonic valve not well seen.   No evidence of pericardial effusion.   No evidence of pleural effusion.   IVC is dilated andnot collapsing with inspiration.     Signature     ----------------------------------------------------------------   Electronically signed by Lyssa Horta MD(Interpreting   physician) on 03/10/2023 10:44 AM   ----------------------------------------------------------------      Radiology:  < from: CT Angio Chest PE Protocol w/ IV Cont (04.09.23 @ 21:49) >    IMPRESSION:  No pulmonary embolus.    Small right and trace left pleural effusions with adjacent compressive   atelectasis.    Small volume upper abdominal ascites.    < end of copied text >

## 2023-04-18 ENCOUNTER — TRANSCRIPTION ENCOUNTER (OUTPATIENT)
Age: 68
End: 2023-04-18

## 2023-04-18 VITALS
HEART RATE: 70 BPM | SYSTOLIC BLOOD PRESSURE: 118 MMHG | TEMPERATURE: 98 F | OXYGEN SATURATION: 95 % | RESPIRATION RATE: 18 BRPM | DIASTOLIC BLOOD PRESSURE: 53 MMHG

## 2023-04-18 LAB
ANION GAP SERPL CALC-SCNC: 4 MMOL/L — LOW (ref 5–17)
BUN SERPL-MCNC: 53 MG/DL — HIGH (ref 7–23)
CALCIUM SERPL-MCNC: 8.9 MG/DL — SIGNIFICANT CHANGE UP (ref 8.5–10.1)
CHLORIDE SERPL-SCNC: 91 MMOL/L — LOW (ref 96–108)
CO2 SERPL-SCNC: 42 MMOL/L — HIGH (ref 22–31)
CREAT SERPL-MCNC: 1.02 MG/DL — SIGNIFICANT CHANGE UP (ref 0.5–1.3)
EGFR: 59 ML/MIN/1.73M2 — LOW
GLUCOSE BLDC GLUCOMTR-MCNC: 174 MG/DL — HIGH (ref 70–99)
GLUCOSE BLDC GLUCOMTR-MCNC: 223 MG/DL — HIGH (ref 70–99)
GLUCOSE BLDC GLUCOMTR-MCNC: 296 MG/DL — HIGH (ref 70–99)
GLUCOSE SERPL-MCNC: 329 MG/DL — HIGH (ref 70–99)
KAPPA LC SER QL IFE: 2.24 MG/DL — HIGH (ref 0.33–1.94)
KAPPA/LAMBDA FREE LIGHT CHAIN RATIO, SERUM: 2 RATIO — HIGH (ref 0.26–1.65)
LAMBDA LC SER QL IFE: 1.12 MG/DL — SIGNIFICANT CHANGE UP (ref 0.57–2.63)
POTASSIUM SERPL-MCNC: 3.6 MMOL/L — SIGNIFICANT CHANGE UP (ref 3.5–5.3)
POTASSIUM SERPL-SCNC: 3.6 MMOL/L — SIGNIFICANT CHANGE UP (ref 3.5–5.3)
SARS-COV-2 RNA SPEC QL NAA+PROBE: SIGNIFICANT CHANGE UP
SODIUM SERPL-SCNC: 137 MMOL/L — SIGNIFICANT CHANGE UP (ref 135–145)

## 2023-04-18 PROCEDURE — 99239 HOSP IP/OBS DSCHRG MGMT >30: CPT

## 2023-04-18 RX ORDER — ALBUTEROL 90 UG/1
2 AEROSOL, METERED ORAL
Qty: 0 | Refills: 0 | DISCHARGE

## 2023-04-18 RX ORDER — SENNA PLUS 8.6 MG/1
2 TABLET ORAL
Qty: 0 | Refills: 0 | DISCHARGE
Start: 2023-04-18

## 2023-04-18 RX ORDER — SACUBITRIL AND VALSARTAN 24; 26 MG/1; MG/1
1 TABLET, FILM COATED ORAL
Qty: 0 | Refills: 0 | DISCHARGE
Start: 2023-04-18

## 2023-04-18 RX ORDER — ALBUTEROL 90 UG/1
2 AEROSOL, METERED ORAL
Qty: 0 | Refills: 0 | DISCHARGE
Start: 2023-04-18

## 2023-04-18 RX ORDER — NYSTATIN CREAM 100000 [USP'U]/G
1 CREAM TOPICAL
Qty: 0 | Refills: 0 | DISCHARGE
Start: 2023-04-18

## 2023-04-18 RX ORDER — DULAGLUTIDE 4.5 MG/.5ML
1 INJECTION, SOLUTION SUBCUTANEOUS
Qty: 0 | Refills: 0 | DISCHARGE

## 2023-04-18 RX ORDER — LOSARTAN/HYDROCHLOROTHIAZIDE 100MG-25MG
1 TABLET ORAL
Qty: 0 | Refills: 0 | DISCHARGE

## 2023-04-18 RX ORDER — TIOTROPIUM BROMIDE 18 UG/1
2 CAPSULE ORAL; RESPIRATORY (INHALATION)
Qty: 0 | Refills: 0 | DISCHARGE
Start: 2023-04-18

## 2023-04-18 RX ORDER — NISOLDIPINE 25.5 MG/1
1 TABLET, FILM COATED, EXTENDED RELEASE ORAL
Qty: 0 | Refills: 0 | DISCHARGE

## 2023-04-18 RX ORDER — INSULIN LISPRO 100/ML
7 VIAL (ML) SUBCUTANEOUS
Qty: 0 | Refills: 0 | DISCHARGE
Start: 2023-04-18

## 2023-04-18 RX ORDER — FUROSEMIDE 40 MG
1 TABLET ORAL
Qty: 0 | Refills: 0 | DISCHARGE
Start: 2023-04-18

## 2023-04-18 RX ORDER — TIOTROPIUM BROMIDE 18 UG/1
1 CAPSULE ORAL; RESPIRATORY (INHALATION)
Qty: 0 | Refills: 0 | DISCHARGE

## 2023-04-18 RX ORDER — INSULIN GLARGINE 100 [IU]/ML
23 INJECTION, SOLUTION SUBCUTANEOUS
Qty: 0 | Refills: 0 | DISCHARGE
Start: 2023-04-18

## 2023-04-18 RX ORDER — POLYETHYLENE GLYCOL 3350 17 G/17G
17 POWDER, FOR SOLUTION ORAL
Qty: 0 | Refills: 0 | DISCHARGE
Start: 2023-04-18

## 2023-04-18 RX ORDER — FUROSEMIDE 40 MG
40 TABLET ORAL
Refills: 0 | Status: DISCONTINUED | OUTPATIENT
Start: 2023-04-18 | End: 2023-04-18

## 2023-04-18 RX ORDER — METFORMIN HYDROCHLORIDE 850 MG/1
4 TABLET ORAL
Qty: 0 | Refills: 0 | DISCHARGE

## 2023-04-18 RX ADMIN — SACUBITRIL AND VALSARTAN 1 TABLET(S): 24; 26 TABLET, FILM COATED ORAL at 09:05

## 2023-04-18 RX ADMIN — Medication 1000 UNIT(S): at 09:06

## 2023-04-18 RX ADMIN — ALBUTEROL 2 PUFF(S): 90 AEROSOL, METERED ORAL at 10:12

## 2023-04-18 RX ADMIN — Medication 60 MILLIGRAM(S): at 09:06

## 2023-04-18 RX ADMIN — Medication 40 MILLIGRAM(S): at 13:22

## 2023-04-18 RX ADMIN — BUDESONIDE AND FORMOTEROL FUMARATE DIHYDRATE 2 PUFF(S): 160; 4.5 AEROSOL RESPIRATORY (INHALATION) at 10:10

## 2023-04-18 RX ADMIN — POLYETHYLENE GLYCOL 3350 17 GRAM(S): 17 POWDER, FOR SOLUTION ORAL at 09:06

## 2023-04-18 RX ADMIN — HEPARIN SODIUM 5000 UNIT(S): 5000 INJECTION INTRAVENOUS; SUBCUTANEOUS at 09:06

## 2023-04-18 RX ADMIN — Medication 7 UNIT(S): at 16:59

## 2023-04-18 RX ADMIN — ALBUTEROL 2 PUFF(S): 90 AEROSOL, METERED ORAL at 16:34

## 2023-04-18 RX ADMIN — Medication 50 MILLIGRAM(S): at 09:05

## 2023-04-18 RX ADMIN — Medication 4: at 12:42

## 2023-04-18 RX ADMIN — TIOTROPIUM BROMIDE 2 PUFF(S): 18 CAPSULE ORAL; RESPIRATORY (INHALATION) at 10:11

## 2023-04-18 RX ADMIN — MAGNESIUM OXIDE 400 MG ORAL TABLET 400 MILLIGRAM(S): 241.3 TABLET ORAL at 09:05

## 2023-04-18 RX ADMIN — Medication 2: at 08:10

## 2023-04-18 RX ADMIN — Medication 7 UNIT(S): at 12:42

## 2023-04-18 RX ADMIN — Medication 7 UNIT(S): at 08:10

## 2023-04-18 RX ADMIN — PREGABALIN 1000 MICROGRAM(S): 225 CAPSULE ORAL at 09:06

## 2023-04-18 RX ADMIN — Medication 81 MILLIGRAM(S): at 09:05

## 2023-04-18 RX ADMIN — ALBUTEROL 2 PUFF(S): 90 AEROSOL, METERED ORAL at 00:22

## 2023-04-18 NOTE — DISCHARGE NOTE PROVIDER - PROVIDER TOKENS
Encounter Date: 5/14/2022       History     Chief Complaint   Patient presents with    Cough     Recently dx with influenza A on 05/12/22. Prescribed meds and sent home. States she keeps getting bad coughing spells and becomes sob during them.     67 year old female with complaints of SOB with severe cough. Patient was seen here in ER On 5/9 and sent home. She saw Dr. Boyd on 5/11 and was diagnosed with Influenza. She as given phenergan with codeine , budesonide, levalbuterol. Patient states this has not alleviated her symptoms. Denies fever, nausea or vomiting. Sats are 96% on room air. Patient appears uncomfortable .         Review of patient's allergies indicates:  No Known Allergies  Past Medical History:   Diagnosis Date    Acid reflux     Aortic valve regurgitation     Bilateral carotid artery disease     Bronchitis     Coronary artery disease     Esophageal spasm     HLD (hyperlipidemia)     Hypertension     Mitral regurgitation     PONV (postoperative nausea and vomiting)     PVC's (premature ventricular contractions)     UTI (urinary tract infection)      Past Surgical History:   Procedure Laterality Date    ABLATION N/A 8/22/2018    Procedure: Ablation;  Surgeon: Chase Simpson MD;  Location: [facility];  Service: Cardiology;  Laterality: N/A;  op#5    CHOLEY      HYSTERECTOMY      PARTIAL    SINUS SURGERY Right 1990    TONSILLECTOMY       Family History   Problem Relation Age of Onset    Heart disease Mother         MVP    Diabetes Maternal Grandfather     No Known Problems Father     No Known Problems Sister     No Known Problems Daughter     No Known Problems Maternal Aunt     No Known Problems Maternal Uncle     No Known Problems Paternal Aunt     No Known Problems Paternal Uncle     No Known Problems Maternal Grandmother     No Known Problems Paternal Grandmother     No Known Problems Paternal Grandfather     Breast cancer Neg Hx     Ovarian cancer Neg Hx     BRCA  1/2 Neg Hx      Social History     Tobacco Use    Smoking status: Never Smoker    Smokeless tobacco: Never Used   Substance Use Topics    Alcohol use: No    Drug use: No     Review of Systems   Constitutional: Negative for fever.   HENT: Negative for sore throat.    Respiratory: Positive for cough, shortness of breath and wheezing. Negative for chest tightness.    Cardiovascular: Negative for chest pain.   Gastrointestinal: Negative for nausea.   Genitourinary: Negative for dysuria.   Musculoskeletal: Negative for back pain.   Skin: Negative for rash.   Neurological: Negative for weakness.   Hematological: Does not bruise/bleed easily.       Physical Exam     Initial Vitals [05/14/22 1730]   BP Pulse Resp Temp SpO2   (!) 167/89 87 18 98.5 °F (36.9 °C) 96 %      MAP       --         Physical Exam    Nursing note and vitals reviewed.  Constitutional: Vital signs are normal. She appears well-developed and well-nourished. She is cooperative.   HENT:   Head: Normocephalic and atraumatic.   Right Ear: Hearing and external ear normal.   Left Ear: Hearing and external ear normal.   Nose: Nose normal.   Mouth/Throat: Uvula is midline, oropharynx is clear and moist and mucous membranes are normal.   Eyes: Conjunctivae, EOM and lids are normal. Pupils are equal, round, and reactive to light.   Neck: Trachea normal. Neck supple.   Normal range of motion.   Full passive range of motion without pain.     Cardiovascular: Normal rate, regular rhythm, S1 normal, S2 normal, normal heart sounds and normal pulses.   Pulmonary/Chest: Effort normal. No accessory muscle usage. No tachypnea and no bradypnea. No respiratory distress. She has wheezes in the right upper field, the right middle field, the right lower field, the left upper field, the left middle field and the left lower field. She has rhonchi in the right upper field, the right middle field, the right lower field, the left upper field, the left middle field and the left  lower field.   Musculoskeletal:      Cervical back: Full passive range of motion without pain, normal range of motion and neck supple.     Neurological: She is alert.         ED Course   Procedures  Labs Reviewed - No data to display       Imaging Results    None       X-Rays:   Independently Interpreted Readings:   Other Readings:  Possible acute infective pneumonia vs aspiration pneumonitis    Medications   albuterol-ipratropium 2.5 mg-0.5 mg/3 mL nebulizer solution 3 mL (has no administration in time range)     Medical Decision Making:   Other:   I discussed test(s) with the performing physician.       <> Summary of the Findings: Dr Duran suggested  I do a CT of chest without contrast             ED Course as of 05/14/22 1932   Sat May 14, 2022   1930 Spoke with Qiana Morfin NP for Dr. Hall.  Will admit to observation. 1 round of abx ordered.  [NA]      ED Course User Index  [NA] Camron Duran MD             Clinical Impression:   Final diagnoses:  [R05.9] Cough                 Alexander Syed III, MATIAS  05/14/22 1935     PROVIDER:[TOKEN:[[id number]],FOLLOWUP:[1 week],ESTABLISHEDPATIENT:[T]],PROVIDER:[TOKEN:[[id number]],FOLLOWUP:[1 week],ESTABLISHEDPATIENT:[T]],PROVIDER:[TOKEN:[[id number]],FOLLOWUP:[1 week],ESTABLISHEDPATIENT:[T]],PROVIDER:[TOKEN:[[id number]],FOLLOWUP:[2 weeks],ESTABLISHEDPATIENT:[T]]

## 2023-04-18 NOTE — PROVIDER CONTACT NOTE (OTHER) - SITUATION
Faxed discharge instructions to office. - Nola GERBER
Faxed discharge instructions to office. - Nola GERBER
BP 83/47, MAP 56

## 2023-04-18 NOTE — PROGRESS NOTE ADULT - ASSESSMENT
68y old Female with PMH COPD (on 3L O2 PRN), Type 2 DM, pulmonary HTN, LBBB, HTN, HLD wit hypoxic resp failure from COPD exacerbation and acute CHF exacerbation  1. Acute on chronic diastolic congestive heart failure: continue lasix  -could not tolerate cardiomems procedure  -currently on lasix 40 mg BID PO. management per cardiology  -supplemental o2  -started on entresto  2. Very severe COPD / acute on chronic hypercarbic respiratory failure with hypoxia: continue symbicort, spiriva  -continue albuterol PRN  -s/p methylprednisolone, now on prednisone 60 mg. continue taper as outpatient  -pt  3. Pulmonary HTN. Group 2 and group 3 etiology. Optimize treatment for chronic cardiac and pulmonary disease. Continue supplemental O2.   4. ? CARRINGTON / obesity hypoventilation. Consider outpatient eval for CARRINGTON if patient agrees  -bipap overnight  5. pleural effusions: likely from volume overload. medical management    f/u with dr murrieta after d/c

## 2023-04-18 NOTE — DISCHARGE NOTE PROVIDER - HOSPITAL COURSE
Physical Exam:  T(C): 36.8 (18 Apr 2023 08:05), Max: 37 (17 Apr 2023 16:42)  T(F): 98.2 (18 Apr 2023 08:05), Max: 98.6 (17 Apr 2023 16:42)  HR: 65 (18 Apr 2023 08:05) (56 - 78)  BP: 134/71 (18 Apr 2023 08:05) (115/50 - 134/71)  RR: 18 (18 Apr 2023 08:05) (18 - 19)  SpO2: 93% (18 Apr 2023 08:05) (93% - 96%)    Parameters below as of 18 Apr 2023 08:05  Patient On (Oxygen Delivery Method): nasal cannula  O2 Flow (L/min): 3.5    Constitutional: NAD, awake and alert, obese female; Frail  HEENT: PERRL, EOMI, MMM  Respiratory: dec BS at bases / expiratory wheeze improving; Normal respiratory effort on NC  Cardiovascular: S1 and S2, RRR, no murmurs, gallops or rubs  Gastrointestinal: +BS, soft, non-tender, non-distended, no CVA tenderness  Extremities: +LE edema b/l improving, +DP pulses b/l  Neurological: A&O x 3, no focal deficits  Musculoskeletal: 5/5 strength b/l upper and lower extremities  Skin: Normal, skin warm and dry    Assessment/Plan:  67 y/o F with morbid obesity, severe COPD (on 3L NC at home), DM, HTN, HLD, severe pHTN, presented with low SpO2 at home      1. Acute hypoxic and hypercapnic respiratory distress likely secondary to acute CHF exacerbation, COPD exacerbation, severe pulmonary HTN, CARRINGTON and obesity hypoventilation syndrome   - s/p Telemetry/continuous pulse ox   - SpO2 53% at home -> % on Bipap -> 93% on 50% venti mask ->titrated down home dose nasal canula, continue  - c/w CPAP QHS and PRN   -  pt is overloaded on exam, CTA: b/l effusions, abd ascites   - ECHO (3/10/23): EF 55-60%, RA and RV dilation, moderate 2+ tricuspid regurgitation, severe pulmonary HTN   - s/p 40 mg IVP lasix in the ER, continue with 40 mg IVP TID, on lasix gtt as below -> Lost IV access (4/15). Cardiology evaluated->switched to PO lasix BID (4/15)  - c/w home medications: beta blocker and ARB . STOP HCTZ/ norvasc  - Strict I+Os, daily weights   - 2L H20 restriction, 2g sodium restriction      - Albuterol Q4H standing   - c/w Spiriva and Symbicort   - s/p 125 mg of Solumedrol, c/w Solumedrol 40 mg Q8H and taper down ->switched to PO prednisone 60mg BID (4/15), continue taper  - Cardiology consult/recs -> Outpatient f/u with Dr Parker  - Pulmonology consult/recs -> Outpatient f/u with Dr Mauricio   - Advanced heart failure team following -> Outpatient f/u with SADIE Friend   - Pt went for CardioMEMS 4/13, procedure aborted 2/2 inability to tolerate procedure table 2/2 increase back and shoulder pain, patient moving on table and contaminating sterility, device not placed   - started entresto, losartan d/c'd    #Hyperkalemia .Stable  - s/p lokelma with improvement. follow labs outpatient     #DMII with Hyperglycemia  - HbA1c - 7.3  - Carb restriction on diet   - C/w Lantus Ademelog TIDAC, moderate dose ISS ->titrate accordingly at [facility]    #Constipation   - c/w bowel regimen     # History of COPD (on nocturnal home O2), Type 2 DM, pulmonary HTN, LBBB, HTN, HLD   - c/w home medications   - Cholithiasis -> f/u with PCP outpt   - Hold Nisoldipine given lower extremity swelling     Dispo: discharge to [facility] in stable condition    Final diagnosis, treatment plan, and follow-up recommendations were discussed and explained to the patient. The patient was given an opportunity to ask questions concerning the diagnosis and treatment plan. The patient acknowledged understanding of the diagnosis, treatment, and follow-up recommendations. The patient was advised to seek urgent care upon discharge if worsening symptoms develop prior to scheduled follow-up. Time spent on discharge included time with the patient, and also coordinating discharge care as outlined below.    Total time spent: 50 min

## 2023-04-18 NOTE — PROGRESS NOTE ADULT - PROVIDER SPECIALTY LIST ADULT
Hospitalist
Pulmonology
Pulmonology
Cardiology
Hospitalist
Hospitalist
Pulmonology
Pulmonology
Heart Failure
Hospitalist
Hospitalist
Pulmonology
Hospitalist
Heart Failure
Cardiology
Heart Failure

## 2023-04-18 NOTE — DISCHARGE NOTE PROVIDER - NSDCCPCAREPLAN_GEN_ALL_CORE_FT
PRINCIPAL DISCHARGE DIAGNOSIS  Diagnosis: COPD exacerbation  Assessment and Plan of Treatment: WHAT IS CHRONIC OBSTRUCTIVE PULMONARY DISEASE? Chronic Obstructive Pulmonary Disease (COPD) is a chronic condition that affects the lungs, causing inflammation and limiting airflow. In most cases COPD gets worse overtime.  THINGS TO DO: (1) Use Purse-lip breathing any time you feel short of breath (Breath in through your nose and purse your lips with a slow and controlled exhale) (2) Avoid any triggers that make your symptoms worse (pollen or pollution) (3) Exercise daily (4) Do not smoke and avoid secondhand smoke (5) Attend pulmonary rehab as directed by your pulmonologist (6) Ask about vaccines with your primary care provider  MONITOR THESE SIGNS AND SYMPTOMS: (1) Worsening shortness of breath (2) Worsening cough or wheezing (3) Cough up blood (4) Chest Pain (5) Feeling confused or dizzy. If you experience any of these, DO alert your primary care provider, or return to the Emergency Department if you feel very sick.   Continue Advair diskus, spiriva and albuterol inhalers  Continue duonebs every 4 hours as needed for SOB  Continue supplemental oxygen 3-4L NC, titrate accordingly   Continue Prednisone (steroid) taper as below:  Prednisone 60mg x3 days   Prednisone 50mg x3 days   Prednisone 40mg x3 days   Prednisone 30mg x3 days   Prednisone 20mg x3 days   Prednisone 10mg x3 days, THEN STOP   Follow up with pulmonary Dr. Mauricio for further management      SECONDARY DISCHARGE DIAGNOSES  Diagnosis: Severe pulmonary hypertension  Assessment and Plan of Treatment: Follow up with pulmonary Dr. Mauricio for further management    Diagnosis: CHF exacerbation  Assessment and Plan of Treatment: Seen by Cardiologist Dr. Parker, and advanced heart failure PA Osmar Friend - follow up with both in the office within 1-2 weeks after discharge for continued management   Continue Entresto 24-26 twice a day  Continue Lasix 40mg twice a day  Continue metoprolol succinate 50mg once a day    Diagnosis: Obesity hypoventilation syndrome  Assessment and Plan of Treatment: Due to morbid obesity - recommend weight loss with diet and exercise, follow up with nutritionist at rehab    Diagnosis: CARRINGTON on CPAP  Assessment and Plan of Treatment: Continue CPAP at night as tolerated  Follow up with pulmonary Dr. Mauricio for further management    Diagnosis: DM (diabetes mellitus)  Assessment and Plan of Treatment: Continue Lantus 23 units at bedtime  Continue Admelog 7 units three times a day before meals   Continue VINCENT   Titrate insulin accordingly while steroids are tapered

## 2023-04-18 NOTE — DISCHARGE NOTE PROVIDER - NSDCHC_MEDRECSTATUS_GEN_ALL_CORE
Admission Reconciliation is Completed  Discharge Reconciliation is Completed
PRINCIPAL DISCHARGE DIAGNOSIS  Diagnosis: Weakness  Assessment and Plan of Treatment: Likely from withdrawl effects of baclofen and/or lyrica. Continue to take medication as prescribed and do not abruptly stop your medication. Seek medical advice on down titrating your medication if you want/choose to do so.

## 2023-04-18 NOTE — DISCHARGE NOTE NURSING/CASE MANAGEMENT/SOCIAL WORK - NSTRANSFERBELONGINGSDISPO_GEN_A_NUR
with patient no loss of consciousness, no gait abnormality, no headache, no sensory deficits, and no weakness.

## 2023-04-18 NOTE — CHART NOTE - NSCHARTNOTEFT_GEN_A_CORE
Patient to be discharged.  Started on Entresto today.  can decrease lasix to 40 mg po daily.  Will see next week in HF office

## 2023-04-18 NOTE — DISCHARGE NOTE PROVIDER - CARE PROVIDERS DIRECT ADDRESSES
,[email or web address],DirectAddress_Unknown,DirectAddress_Unknown,DirectAddress_Unknown

## 2023-04-18 NOTE — DISCHARGE NOTE PROVIDER - NSDCMRMEDTOKEN_GEN_ALL_CORE_FT
Advair Diskus 250 mcg-50 mcg inhalation powder: 1 puff(s) inhaled 2 times a day  albuterol 90 mcg/inh inhalation aerosol: 2 puff(s) inhaled every 4 hours  Aspirin Enteric Coated 81 mg oral delayed release tablet: 1 tab(s) orally once a day  atorvastatin 20 mg oral tablet: 1 tab(s) orally once a day  bisacodyl 5 mg oral delayed release tablet: 1 tab(s) orally every 12 hours As needed Constipation  cyanocobalamin 1000 mcg oral tablet: 1 tab(s) orally once a day  furosemide 40 mg oral tablet: 1 tab(s) orally 2 times a day  insulin glargine 100 units/mL subcutaneous solution: 23 unit(s) subcutaneous once a day (at bedtime)  insulin lispro 100 units/mL injectable solution: 7 unit(s) injectable 3 times a day (before meals)  magnesium oxide 500 mg oral tablet: 1 tab(s) orally once a day  metoprolol succinate 50 mg oral tablet, extended release: 1 tab(s) orally once a day  nystatin 100,000 units/g topical powder: 1 Apply topically to affected area 2 times a day  polyethylene glycol 3350 oral powder for reconstitution: 17 gram(s) orally once a day  predniSONE 10 mg oral tablet: 6 tab(s) orally once a day  predniSONE 10 mg oral tablet: 5 tab(s) orally once a day  predniSONE 10 mg oral tablet: 4 tab(s) orally once a day  predniSONE 10 mg oral tablet: 3 tab(s) orally once a day  predniSONE 10 mg oral tablet: 2 tab(s) orally once a day  predniSONE 10 mg oral tablet: 1 tab(s) orally once a day  sacubitril-valsartan 24 mg-26 mg oral tablet: 1 tab(s) orally 2 times a day  senna leaf extract oral tablet: 2 tab(s) orally once a day (at bedtime)  tiotropium 2.5 mcg/inh inhalation aerosol: 2 puff(s) inhaled once a day  Vitamin D3 25 mcg (1000 intl units) oral tablet: 1 tab(s) orally once a day

## 2023-04-18 NOTE — DISCHARGE NOTE PROVIDER - CARE PROVIDER_API CALL
Francisco Mauricio)  Internal Medicine; Pulmonary Disease; Sleep Medicine  [address] [place] [address]  Phone: [phone]  Fax: [phone]  Established Patient  Follow Up Time: 1 week    Rock Parker)  Cardiology  [address] [place] [address]  Phone: [phone]  Fax: [phone]  Established Patient  Follow Up Time: 1 week    Osmar Friend)  Physician Assistant Services  [address] [place] [address]  Phone: [phone]  Fax: [phone]  Established Patient  Follow Up Time: 1 week    Kate Mary  FAMILY MEDICINE  [address] [place] [address], [place] [address]  Phone: [phone]  Fax: [phone]  Established Patient  Follow Up Time: 2 weeks

## 2023-04-18 NOTE — DISCHARGE NOTE PROVIDER - NSDCCAREPROVSEEN_GEN_ALL_CORE_FT
Lia, Noel Garza, Luba Walsh, Eh Shah, Dez Easton, Alyx Parker, Rock Friend, Osmar Mauricio, Francisco PEDRO

## 2023-04-18 NOTE — PROGRESS NOTE ADULT - SUBJECTIVE AND OBJECTIVE BOX
Subjective:  plan for dc to job  started on entresto  breathing unchanged    Review of Systems:  All 10 systems reviewed in detailed and found to be negative with the exception of what has already been described above    Allergies:  latex (Unknown)  No Known Drug Allergies    Meds  MEDICATIONS  (STANDING):  albuterol    90 MICROgram(s) HFA Inhaler 2 Puff(s) Inhalation every 4 hours  aspirin enteric coated 81 milliGRAM(s) Oral daily  atorvastatin 20 milliGRAM(s) Oral at bedtime  budesonide 160 MICROgram(s)/formoterol 4.5 MICROgram(s) Inhaler 2 Puff(s) Inhalation two times a day  cholecalciferol 1000 Unit(s) Oral daily  cyanocobalamin 1000 MICROGram(s) Oral daily  dextrose 5%. 1000 milliLiter(s) (100 mL/Hr) IV Continuous <Continuous>  dextrose 5%. 1000 milliLiter(s) (50 mL/Hr) IV Continuous <Continuous>  dextrose 50% Injectable 25 Gram(s) IV Push once  dextrose 50% Injectable 12.5 Gram(s) IV Push once  dextrose 50% Injectable 25 Gram(s) IV Push once  furosemide    Tablet 40 milliGRAM(s) Oral two times a day  glucagon  Injectable 1 milliGRAM(s) IntraMuscular once  heparin   Injectable 5000 Unit(s) SubCutaneous every 12 hours  insulin glargine Injectable (LANTUS) 23 Unit(s) SubCutaneous at bedtime  insulin lispro (ADMELOG) corrective regimen sliding scale   SubCutaneous at bedtime  insulin lispro (ADMELOG) corrective regimen sliding scale   SubCutaneous three times a day before meals  insulin lispro Injectable (ADMELOG) 7 Unit(s) SubCutaneous three times a day before meals  magnesium oxide 400 milliGRAM(s) Oral daily  metoprolol succinate ER 50 milliGRAM(s) Oral daily  nystatin Powder 1 Application(s) Topical two times a day  polyethylene glycol 3350 17 Gram(s) Oral daily  predniSONE   Tablet 60 milliGRAM(s) Oral two times a day  sacubitril 24 mG/valsartan 26 mG 1 Tablet(s) Oral two times a day  senna 2 Tablet(s) Oral at bedtime  tiotropium 2.5 MICROgram(s) Inhaler 2 Puff(s) Inhalation daily    MEDICATIONS  (PRN):  acetaminophen     Tablet .. 650 milliGRAM(s) Oral every 6 hours PRN Temp greater or equal to 38C (100.4F), Mild Pain (1 - 3)  aluminum hydroxide/magnesium hydroxide/simethicone Suspension 30 milliLiter(s) Oral every 4 hours PRN Dyspepsia  bisacodyl 5 milliGRAM(s) Oral every 12 hours PRN Constipation  dextrose Oral Gel 15 Gram(s) Oral once PRN Blood Glucose LESS THAN 70 milliGRAM(s)/deciliter  melatonin 3 milliGRAM(s) Oral at bedtime PRN Insomnia  ondansetron Injectable 4 milliGRAM(s) IV Push every 8 hours PRN Nausea and/or Vomiting    Physical Exam  T(C): 36.9 (04-18-23 @ 16:16), Max: 37 (04-17-23 @ 16:42)  HR: 70 (04-18-23 @ 16:16) (56 - 78)  BP: 118/53 (04-18-23 @ 16:16) (115/50 - 134/71)  RR: 18 (04-18-23 @ 16:16) (18 - 19)  SpO2: 95% (04-18-23 @ 16:16) (93% - 96%)  Gen: Alert, oriented, on nasal cannula  HEENT: Anicteric sclera, moist mucous membranes  Cardio: Regular rhythm and rate, normal S1S2, no murmurs  Resp: decrease breath sounds poor air movement  GI: Nontender, nondistended, normoactive bowel sounds  Ext: 2+ edema bilaterally  Neuro: Nonfocal    Labs:    04-18    137  |  91<L>  |  53<H>  ----------------------------<  329<H>  3.6   |  42<H>  |  1.02    Ca    8.9      18 Apr 2023 09:37      < from: CT Angio Chest PE Protocol w/ IV Cont (04.09.23 @ 21:49) >  ACC: [id number] EXAM:  CT ANGIO CHEST PULM ART [facility]   ORDERED BY: RON MARCELINO     PROCEDURE DATE:  04/09/2023          INTERPRETATION:  CLINICAL INFORMATION: Hypoxia and shortness of breath    COMPARISON: CT chest 2/14/2016.    CONTRAST/COMPLICATIONS:  IV Contrast: Omnipaque 350  80 cc administered   20 cc discarded  Oral Contrast: NONE  Complications: None reported at time of study completion    PROCEDURE:  CT Angiography of the Chest.  Sagittal and coronal reformats were performed as wellas 3D (MIP)   reconstructions.    FINDINGS:    LUNGS AND AIRWAYS: Patent central airways. Compressive atelectasis in the   right middle and bilateral lower lobes adjacent to pleural effusions.   Linear type scarring in the right upper lobe. PLEURA: Small right and   trace left pleural effusions.  MEDIASTINUM AND MARTINEZ: No lymphadenopathy.  VESSELS: No pulmonary embolus. Atherosclerotic calcifications of the   aorta and coronary arteries..  HEART: Mild cardiomegaly. No pericardial effusion.  CHEST WALL AND LOWER NECK: Within normal limits.  VISUALIZED UPPER ABDOMEN: Small volume upper abdominal ascites, grossly   unchanged when compared to prior study. Cholelithiasis.  BONES: Degenerative changes of the spine.    IMPRESSION:  No pulmonary embolus.    Small right and trace left pleural effusions with adjacent compressive   atelectasis.    Small volume upper abdominal ascites.    TTE Echo Complete w/ Contrast w/ Doppler (03.10.23 @ 10:02) >   Impression     Summary     Endocardium is not well visualized, however, overall left ventricular   systolic function appears normal. Technically Difficult Study.   Septal flattening is seen; this finding is consistent with right heart   pressure / volume overload.   Estimated left ventricular ejection fraction is 55-60 %.   Normal appearing left atrium.   The right atrium appears moderately dilated.   The right ventricle is severely dilated.   The right ventricular apex appears hypokinetic.   The aortic valve is trileaflet with thin pliable leaflets.   Moderate mitral annular calcification is present.   There is calcification of mitral valve leaflets. The leaflet opening is   normal.   EA reversal of the mitral inflow consistent with reduced compliance of   the   left ventricle.   Trace mitral regurgitation is present.   The tricuspid valve leaflets are thin and pliable; valve motion is   normal.   Moderate (2+) tricuspid valve regurgitation is present.   Severe pulmonary hypertension.   Pulmonic valve not well seen.   No evidence of pericardial effusion.   No evidence of pleural effusion.   IVC is dilated and not collapsing with inspiration.

## 2023-04-18 NOTE — DISCHARGE NOTE NURSING/CASE MANAGEMENT/SOCIAL WORK - NSDCPEFALRISK_GEN_ALL_CORE
For information on Fall & Injury Prevention, visit: [email or web address] OR  [email or web address] [place]  https://www.cdc.gov/steadi/patient.html

## 2023-04-18 NOTE — DISCHARGE NOTE NURSING/CASE MANAGEMENT/SOCIAL WORK - PATIENT PORTAL LINK FT
You can access the FollowMyHealth Patient Portal offered by [facility] by registering at the following website: [email or web address]. By joining [email or web address]’s FollowMyHealth portal, you will also be able to view your health information using other applications (apps) compatible with our system.

## 2023-04-19 LAB — DSDNA AB SER-ACNC: <12 IU/ML — SIGNIFICANT CHANGE UP

## 2023-04-24 DIAGNOSIS — Z53.09 PROCEDURE AND TREATMENT NOT CARRIED OUT BECAUSE OF OTHER CONTRAINDICATION: ICD-10-CM

## 2023-04-24 DIAGNOSIS — N17.9 ACUTE KIDNEY FAILURE, UNSPECIFIED: ICD-10-CM

## 2023-04-24 DIAGNOSIS — E66.2 MORBID (SEVERE) OBESITY WITH ALVEOLAR HYPOVENTILATION: ICD-10-CM

## 2023-04-24 DIAGNOSIS — J96.22 ACUTE AND CHRONIC RESPIRATORY FAILURE WITH HYPERCAPNIA: ICD-10-CM

## 2023-04-24 DIAGNOSIS — I44.7 LEFT BUNDLE-BRANCH BLOCK, UNSPECIFIED: ICD-10-CM

## 2023-04-24 DIAGNOSIS — K80.20 CALCULUS OF GALLBLADDER WITHOUT CHOLECYSTITIS WITHOUT OBSTRUCTION: ICD-10-CM

## 2023-04-24 DIAGNOSIS — Z79.84 LONG TERM (CURRENT) USE OF ORAL HYPOGLYCEMIC DRUGS: ICD-10-CM

## 2023-04-24 DIAGNOSIS — Z66 DO NOT RESUSCITATE: ICD-10-CM

## 2023-04-24 DIAGNOSIS — Z99.81 DEPENDENCE ON SUPPLEMENTAL OXYGEN: ICD-10-CM

## 2023-04-24 DIAGNOSIS — J44.1 CHRONIC OBSTRUCTIVE PULMONARY DISEASE WITH (ACUTE) EXACERBATION: ICD-10-CM

## 2023-04-24 DIAGNOSIS — Z91.040 LATEX ALLERGY STATUS: ICD-10-CM

## 2023-04-24 DIAGNOSIS — K59.00 CONSTIPATION, UNSPECIFIED: ICD-10-CM

## 2023-04-24 DIAGNOSIS — I11.0 HYPERTENSIVE HEART DISEASE WITH HEART FAILURE: ICD-10-CM

## 2023-04-24 DIAGNOSIS — I27.20 PULMONARY HYPERTENSION, UNSPECIFIED: ICD-10-CM

## 2023-04-24 DIAGNOSIS — J96.21 ACUTE AND CHRONIC RESPIRATORY FAILURE WITH HYPOXIA: ICD-10-CM

## 2023-04-24 DIAGNOSIS — I50.33 ACUTE ON CHRONIC DIASTOLIC (CONGESTIVE) HEART FAILURE: ICD-10-CM

## 2023-04-24 DIAGNOSIS — Z79.82 LONG TERM (CURRENT) USE OF ASPIRIN: ICD-10-CM

## 2023-04-24 DIAGNOSIS — J98.11 ATELECTASIS: ICD-10-CM

## 2023-04-24 DIAGNOSIS — Z79.52 LONG TERM (CURRENT) USE OF SYSTEMIC STEROIDS: ICD-10-CM

## 2023-04-24 DIAGNOSIS — Z79.51 LONG TERM (CURRENT) USE OF INHALED STEROIDS: ICD-10-CM

## 2023-04-24 DIAGNOSIS — E11.65 TYPE 2 DIABETES MELLITUS WITH HYPERGLYCEMIA: ICD-10-CM

## 2023-04-24 DIAGNOSIS — Z79.85 LONG-TERM (CURRENT) USE OF INJECTABLE NON-INSULIN ANTIDIABETIC DRUGS: ICD-10-CM

## 2023-04-24 DIAGNOSIS — E87.5 HYPERKALEMIA: ICD-10-CM

## 2023-04-24 LAB
ANA TITR SER: NEGATIVE — SIGNIFICANT CHANGE UP
INTERPRETATION SERPL IFE-IMP: SIGNIFICANT CHANGE UP

## 2023-11-28 NOTE — ED ADULT TRIAGE NOTE - GLASGOW COMA SCALE: EYE OPENING, MLM
I called Ms. Yin and left a voicemail. On the message I provided our contact phone number and I offered the option of awaiting a call with the doctor's response or scheduling a virtual visit to discuss the result.   (E4) spontaneous

## 2024-10-11 NOTE — DISCHARGE NOTE PROVIDER - DISCHARGE SERVICE FOR PATIENT
FAMILY HISTORY:  Mother  Still living? Yes, Estimated age: Age Unknown  Hypertension, Age at diagnosis: Age Unknown    
on the discharge service for the patient. I have reviewed and made amendments to the documentation where necessary.

## 2025-01-21 NOTE — ED ADULT NURSE NOTE - OBJECTIVE STATEMENT
Orders added.    Pt A&Ox4, presents to the ED c/o SOB. Pt states symptoms began several days ago. Pt speaking in complete sentences with CPAP in placed. Denies CP or fevers. Pt switched over to BiPAP. Dr. Garcia at the bedside.
